# Patient Record
Sex: FEMALE | Race: WHITE | Employment: OTHER | URBAN - METROPOLITAN AREA
[De-identification: names, ages, dates, MRNs, and addresses within clinical notes are randomized per-mention and may not be internally consistent; named-entity substitution may affect disease eponyms.]

---

## 2024-10-28 ENCOUNTER — HOSPITAL ENCOUNTER (INPATIENT)
Age: 79
LOS: 16 days | Discharge: NURSING, REHAB OR LONG-TERM CARE | DRG: 870 | End: 2024-11-13
Attending: EMERGENCY MEDICINE | Admitting: INTERNAL MEDICINE
Payer: MEDICARE

## 2024-10-28 ENCOUNTER — APPOINTMENT (OUTPATIENT)
Dept: ULTRASOUND IMAGING | Age: 79
DRG: 870 | End: 2024-10-28
Payer: MEDICARE

## 2024-10-28 ENCOUNTER — APPOINTMENT (OUTPATIENT)
Dept: CT IMAGING | Age: 79
DRG: 870 | End: 2024-10-28
Payer: MEDICARE

## 2024-10-28 ENCOUNTER — APPOINTMENT (OUTPATIENT)
Dept: GENERAL RADIOLOGY | Age: 79
DRG: 870 | End: 2024-10-28
Payer: MEDICARE

## 2024-10-28 DIAGNOSIS — A41.9 SEPSIS, DUE TO UNSPECIFIED ORGANISM, UNSPECIFIED WHETHER ACUTE ORGAN DYSFUNCTION PRESENT (HCC): ICD-10-CM

## 2024-10-28 DIAGNOSIS — M62.81 GENERALIZED MUSCLE WEAKNESS: ICD-10-CM

## 2024-10-28 DIAGNOSIS — N30.00 ACUTE CYSTITIS WITHOUT HEMATURIA: ICD-10-CM

## 2024-10-28 DIAGNOSIS — T68.XXXA HYPOTHERMIA, INITIAL ENCOUNTER: Primary | ICD-10-CM

## 2024-10-28 DIAGNOSIS — I48.91 ATRIAL FIBRILLATION, UNSPECIFIED TYPE (HCC): ICD-10-CM

## 2024-10-28 PROBLEM — K74.60 DECOMPENSATED HEPATIC CIRRHOSIS (HCC): Status: ACTIVE | Noted: 2024-10-28

## 2024-10-28 PROBLEM — M62.82 RHABDOMYOLYSIS: Status: ACTIVE | Noted: 2024-10-28

## 2024-10-28 PROBLEM — N17.9 ACUTE KIDNEY FAILURE (HCC): Status: ACTIVE | Noted: 2024-10-28

## 2024-10-28 PROBLEM — R65.21 SEPTIC SHOCK (HCC): Status: ACTIVE | Noted: 2024-10-28

## 2024-10-28 PROBLEM — E87.20 LACTIC ACIDOSIS: Status: ACTIVE | Noted: 2024-10-28

## 2024-10-28 PROBLEM — E87.29 HIGH ANION GAP METABOLIC ACIDOSIS: Status: ACTIVE | Noted: 2024-10-28

## 2024-10-28 PROBLEM — N39.0 URINARY TRACT INFECTION: Status: ACTIVE | Noted: 2024-10-28

## 2024-10-28 PROBLEM — K72.90 DECOMPENSATED HEPATIC CIRRHOSIS (HCC): Status: ACTIVE | Noted: 2024-10-28

## 2024-10-28 PROBLEM — J96.01 ACUTE HYPOXIC RESPIRATORY FAILURE: Status: ACTIVE | Noted: 2024-10-28

## 2024-10-28 LAB
ALBUMIN SERPL-MCNC: 2.6 G/DL (ref 3.5–5.2)
ALBUMIN/GLOB SERPL: 1 {RATIO} (ref 1–2.5)
ALLEN TEST: POSITIVE
ALP SERPL-CCNC: 120 U/L (ref 35–104)
ALT SERPL-CCNC: 82 U/L (ref 10–35)
ANION GAP SERPL CALCULATED.3IONS-SCNC: 29 MMOL/L (ref 9–16)
AST SERPL-CCNC: 139 U/L (ref 10–35)
B-OH-BUTYR SERPL-MCNC: 6.15 MMOL/L (ref 0.02–0.27)
BACTERIA URNS QL MICRO: ABNORMAL
BASOPHILS # BLD: 0 K/UL (ref 0–0.2)
BASOPHILS NFR BLD: 0 % (ref 0–2)
BILIRUB SERPL-MCNC: 1 MG/DL (ref 0–1.2)
BILIRUB UR QL STRIP: ABNORMAL
BUN BLD-MCNC: 111 MG/DL (ref 8–26)
BUN SERPL-MCNC: 122 MG/DL (ref 8–23)
CA-I BLD-SCNC: 1.19 MMOL/L (ref 1.15–1.33)
CALCIUM SERPL-MCNC: 8.8 MG/DL (ref 8.6–10.4)
CASTS #/AREA URNS LPF: ABNORMAL /LPF (ref 0–2)
CASTS #/AREA URNS LPF: ABNORMAL /LPF (ref 0–2)
CHLORIDE BLD-SCNC: 116 MMOL/L (ref 98–107)
CHLORIDE SERPL-SCNC: 107 MMOL/L (ref 98–107)
CK SERPL-CCNC: 1564 U/L (ref 26–192)
CLARITY UR: ABNORMAL
CO2 BLD CALC-SCNC: 14 MMOL/L (ref 22–30)
CO2 SERPL-SCNC: 9 MMOL/L (ref 20–31)
COLOR UR: ABNORMAL
CREAT SERPL-MCNC: 2.3 MG/DL (ref 0.5–0.9)
CREAT UR-MCNC: 105 MG/DL (ref 28–217)
CRP SERPL HS-MCNC: 476 MG/L (ref 0–5)
D DIMER PPP FEU-MCNC: 2.47 UG/ML FEU (ref 0–0.57)
EGFR, POC: 22 ML/MIN/1.73M2
EOSINOPHIL # BLD: 0 K/UL (ref 0–0.44)
EOSINOPHILS RELATIVE PERCENT: 0 % (ref 1–4)
EPI CELLS #/AREA URNS HPF: ABNORMAL /HPF (ref 0–5)
ERYTHROCYTE [DISTWIDTH] IN BLOOD BY AUTOMATED COUNT: 14.4 % (ref 11.8–14.4)
FIO2: 100
GFR, ESTIMATED: 22 ML/MIN/1.73M2
GLUCOSE BLD-MCNC: 239 MG/DL (ref 74–100)
GLUCOSE BLD-MCNC: 256 MG/DL (ref 74–100)
GLUCOSE SERPL-MCNC: 263 MG/DL (ref 74–99)
GLUCOSE UR STRIP-MCNC: NEGATIVE MG/DL
HCO3 VENOUS: 13.6 MMOL/L (ref 22–29)
HCT VFR BLD AUTO: 42.8 % (ref 36.3–47.1)
HCT VFR BLD AUTO: 48 % (ref 36–46)
HGB BLD-MCNC: 13.9 G/DL (ref 11.9–15.1)
HGB UR QL STRIP.AUTO: ABNORMAL
IMM GRANULOCYTES # BLD AUTO: 0.23 K/UL (ref 0–0.3)
IMM GRANULOCYTES NFR BLD: 2 %
INR PPP: 1.8
KETONES UR STRIP-MCNC: ABNORMAL MG/DL
LACTIC ACID, WHOLE BLOOD: 3 MMOL/L (ref 0.7–2.1)
LEUKOCYTE ESTERASE UR QL STRIP: ABNORMAL
LIPASE SERPL-CCNC: 41 U/L (ref 13–60)
LYMPHOCYTES NFR BLD: 2.2 K/UL (ref 1.1–3.7)
LYMPHOCYTES RELATIVE PERCENT: 19 % (ref 24–43)
MAGNESIUM SERPL-MCNC: 3.5 MG/DL (ref 1.6–2.4)
MCH RBC QN AUTO: 30.6 PG (ref 25.2–33.5)
MCHC RBC AUTO-ENTMCNC: 32.5 G/DL (ref 28.4–34.8)
MCV RBC AUTO: 94.3 FL (ref 82.6–102.9)
MODE: ABNORMAL
MONOCYTES NFR BLD: 0.93 K/UL (ref 0.1–1.2)
MONOCYTES NFR BLD: 8 % (ref 3–12)
MORPHOLOGY: NORMAL
MYOGLOBIN SERPL-MCNC: 9165 NG/ML (ref 25–58)
NEGATIVE BASE EXCESS, ART: 15.2 MMOL/L (ref 0–2)
NEGATIVE BASE EXCESS, VEN: 15.6 MMOL/L (ref 0–2)
NEUTROPHILS NFR BLD: 71 % (ref 36–65)
NEUTS SEG NFR BLD: 8.24 K/UL (ref 1.5–8.1)
NITRITE UR QL STRIP: NEGATIVE
NRBC BLD-RTO: 0.3 PER 100 WBC
O2 DELIVERY DEVICE: ABNORMAL
O2 SAT, VEN: 87.6 % (ref 60–85)
PATIENT TEMP: 29.4
PCO2 VENOUS: 42.9 MM HG (ref 41–51)
PH UR STRIP: 5.5 [PH] (ref 5–8)
PH VENOUS: 7.11 (ref 7.32–7.43)
PLATELET # BLD AUTO: ABNORMAL K/UL (ref 138–453)
PLATELET, FLUORESCENCE: ABNORMAL K/UL (ref 138–453)
PO2 VENOUS: 71.9 MM HG (ref 30–50)
POC ANION GAP: 16 MMOL/L (ref 7–16)
POC CREATININE: 2.2 MG/DL (ref 0.51–1.19)
POC HCO3: 13.5 MMOL/L (ref 21–28)
POC HEMOGLOBIN (CALC): 16.3 G/DL (ref 12–16)
POC LACTIC ACID: 2.2 MMOL/L (ref 0.56–1.39)
POC LACTIC ACID: 2.7 MMOL/L (ref 0.56–1.39)
POC O2 SATURATION: 100 % (ref 94–98)
POC PCO2 TEMP: 29.3 MM HG
POC PCO2: 40.8 MM HG (ref 35–48)
POC PH TEMP: 7.23
POC PH: 7.13 (ref 7.35–7.45)
POC PO2 TEMP: 471 MM HG
POC PO2: 521.5 MM HG (ref 83–108)
POTASSIUM BLD-SCNC: 3.7 MMOL/L (ref 3.5–4.5)
POTASSIUM SERPL-SCNC: 3.7 MMOL/L (ref 3.7–5.3)
PROCALCITONIN SERPL-MCNC: 70.7 NG/ML (ref 0–0.09)
PROT SERPL-MCNC: 6.4 G/DL (ref 6.6–8.7)
PROT UR STRIP-MCNC: ABNORMAL MG/DL
PROTHROMBIN TIME: 20.8 SEC (ref 11.7–14.9)
RBC # BLD AUTO: 4.54 M/UL (ref 3.95–5.11)
RBC #/AREA URNS HPF: ABNORMAL /HPF (ref 0–2)
SAMPLE SITE: ABNORMAL
SODIUM BLD-SCNC: 144 MMOL/L (ref 138–146)
SODIUM SERPL-SCNC: 145 MMOL/L (ref 136–145)
SP GR UR STRIP: 1.02 (ref 1–1.03)
T4 FREE SERPL-MCNC: 0.8 NG/DL (ref 0.92–1.68)
TOTAL PROTEIN, URINE: 487 MG/DL
TROPONIN I SERPL HS-MCNC: 45 NG/L (ref 0–14)
TROPONIN I SERPL HS-MCNC: 47 NG/L (ref 0–14)
TSH SERPL DL<=0.05 MIU/L-ACNC: 4.26 UIU/ML (ref 0.27–4.2)
URINE TOTAL PROTEIN CREATININE RATIO: 4.64
UROBILINOGEN UR STRIP-ACNC: NORMAL EU/DL (ref 0–1)
WBC #/AREA URNS HPF: ABNORMAL /HPF (ref 0–5)
WBC OTHER # BLD: 11.6 K/UL (ref 3.5–11.3)

## 2024-10-28 PROCEDURE — 82550 ASSAY OF CK (CPK): CPT

## 2024-10-28 PROCEDURE — 6360000002 HC RX W HCPCS

## 2024-10-28 PROCEDURE — 99285 EMERGENCY DEPT VISIT HI MDM: CPT

## 2024-10-28 PROCEDURE — 87040 BLOOD CULTURE FOR BACTERIA: CPT

## 2024-10-28 PROCEDURE — 83874 ASSAY OF MYOGLOBIN: CPT

## 2024-10-28 PROCEDURE — 80051 ELECTROLYTE PANEL: CPT

## 2024-10-28 PROCEDURE — 6360000002 HC RX W HCPCS: Performed by: STUDENT IN AN ORGANIZED HEALTH CARE EDUCATION/TRAINING PROGRAM

## 2024-10-28 PROCEDURE — 96374 THER/PROPH/DIAG INJ IV PUSH: CPT

## 2024-10-28 PROCEDURE — 36600 WITHDRAWAL OF ARTERIAL BLOOD: CPT

## 2024-10-28 PROCEDURE — 87154 CUL TYP ID BLD PTHGN 6+ TRGT: CPT

## 2024-10-28 PROCEDURE — 85610 PROTHROMBIN TIME: CPT

## 2024-10-28 PROCEDURE — 87186 SC STD MICRODIL/AGAR DIL: CPT

## 2024-10-28 PROCEDURE — 85379 FIBRIN DEGRADATION QUANT: CPT

## 2024-10-28 PROCEDURE — 84439 ASSAY OF FREE THYROXINE: CPT

## 2024-10-28 PROCEDURE — 84145 PROCALCITONIN (PCT): CPT

## 2024-10-28 PROCEDURE — 72125 CT NECK SPINE W/O DYE: CPT

## 2024-10-28 PROCEDURE — 82436 ASSAY OF URINE CHLORIDE: CPT

## 2024-10-28 PROCEDURE — 80307 DRUG TEST PRSMV CHEM ANLYZR: CPT

## 2024-10-28 PROCEDURE — 87077 CULTURE AEROBIC IDENTIFY: CPT

## 2024-10-28 PROCEDURE — 83690 ASSAY OF LIPASE: CPT

## 2024-10-28 PROCEDURE — 2000000000 HC ICU R&B

## 2024-10-28 PROCEDURE — 6360000004 HC RX CONTRAST MEDICATION: Performed by: STUDENT IN AN ORGANIZED HEALTH CARE EDUCATION/TRAINING PROGRAM

## 2024-10-28 PROCEDURE — 82570 ASSAY OF URINE CREATININE: CPT

## 2024-10-28 PROCEDURE — 81001 URINALYSIS AUTO W/SCOPE: CPT

## 2024-10-28 PROCEDURE — 71045 X-RAY EXAM CHEST 1 VIEW: CPT

## 2024-10-28 PROCEDURE — 80053 COMPREHEN METABOLIC PANEL: CPT

## 2024-10-28 PROCEDURE — 82330 ASSAY OF CALCIUM: CPT

## 2024-10-28 PROCEDURE — 82803 BLOOD GASES ANY COMBINATION: CPT

## 2024-10-28 PROCEDURE — 84156 ASSAY OF PROTEIN URINE: CPT

## 2024-10-28 PROCEDURE — 71275 CT ANGIOGRAPHY CHEST: CPT

## 2024-10-28 PROCEDURE — 2580000003 HC RX 258

## 2024-10-28 PROCEDURE — 36415 COLL VENOUS BLD VENIPUNCTURE: CPT

## 2024-10-28 PROCEDURE — 84520 ASSAY OF UREA NITROGEN: CPT

## 2024-10-28 PROCEDURE — 87086 URINE CULTURE/COLONY COUNT: CPT

## 2024-10-28 PROCEDURE — 6370000000 HC RX 637 (ALT 250 FOR IP)

## 2024-10-28 PROCEDURE — 74174 CTA ABD&PLVS W/CONTRAST: CPT

## 2024-10-28 PROCEDURE — 76775 US EXAM ABDO BACK WALL LIM: CPT

## 2024-10-28 PROCEDURE — 87205 SMEAR GRAM STAIN: CPT

## 2024-10-28 PROCEDURE — 82010 KETONE BODYS QUAN: CPT

## 2024-10-28 PROCEDURE — 2500000003 HC RX 250 WO HCPCS: Performed by: STUDENT IN AN ORGANIZED HEALTH CARE EDUCATION/TRAINING PROGRAM

## 2024-10-28 PROCEDURE — 84300 ASSAY OF URINE SODIUM: CPT

## 2024-10-28 PROCEDURE — 86140 C-REACTIVE PROTEIN: CPT

## 2024-10-28 PROCEDURE — 70450 CT HEAD/BRAIN W/O DYE: CPT

## 2024-10-28 PROCEDURE — 94761 N-INVAS EAR/PLS OXIMETRY MLT: CPT

## 2024-10-28 PROCEDURE — 85025 COMPLETE CBC W/AUTO DIFF WBC: CPT

## 2024-10-28 PROCEDURE — 93005 ELECTROCARDIOGRAM TRACING: CPT | Performed by: STUDENT IN AN ORGANIZED HEALTH CARE EDUCATION/TRAINING PROGRAM

## 2024-10-28 PROCEDURE — 83735 ASSAY OF MAGNESIUM: CPT

## 2024-10-28 PROCEDURE — 2500000003 HC RX 250 WO HCPCS

## 2024-10-28 PROCEDURE — 96375 TX/PRO/DX INJ NEW DRUG ADDON: CPT

## 2024-10-28 PROCEDURE — 83605 ASSAY OF LACTIC ACID: CPT

## 2024-10-28 PROCEDURE — 5A1955Z RESPIRATORY VENTILATION, GREATER THAN 96 CONSECUTIVE HOURS: ICD-10-PCS | Performed by: STUDENT IN AN ORGANIZED HEALTH CARE EDUCATION/TRAINING PROGRAM

## 2024-10-28 PROCEDURE — 84484 ASSAY OF TROPONIN QUANT: CPT

## 2024-10-28 PROCEDURE — 87641 MR-STAPH DNA AMP PROBE: CPT

## 2024-10-28 PROCEDURE — 82565 ASSAY OF CREATININE: CPT

## 2024-10-28 PROCEDURE — 2700000000 HC OXYGEN THERAPY PER DAY

## 2024-10-28 PROCEDURE — 93005 ELECTROCARDIOGRAM TRACING: CPT | Performed by: INTERNAL MEDICINE

## 2024-10-28 PROCEDURE — 2580000003 HC RX 258: Performed by: STUDENT IN AN ORGANIZED HEALTH CARE EDUCATION/TRAINING PROGRAM

## 2024-10-28 PROCEDURE — 85014 HEMATOCRIT: CPT

## 2024-10-28 PROCEDURE — 84443 ASSAY THYROID STIM HORMONE: CPT

## 2024-10-28 PROCEDURE — 87088 URINE BACTERIA CULTURE: CPT

## 2024-10-28 PROCEDURE — 82947 ASSAY GLUCOSE BLOOD QUANT: CPT

## 2024-10-28 PROCEDURE — 0BH17EZ INSERTION OF ENDOTRACHEAL AIRWAY INTO TRACHEA, VIA NATURAL OR ARTIFICIAL OPENING: ICD-10-PCS | Performed by: STUDENT IN AN ORGANIZED HEALTH CARE EDUCATION/TRAINING PROGRAM

## 2024-10-28 RX ORDER — INSULIN LISPRO 100 [IU]/ML
0-8 INJECTION, SOLUTION INTRAVENOUS; SUBCUTANEOUS
Status: DISCONTINUED | OUTPATIENT
Start: 2024-10-28 | End: 2024-10-29

## 2024-10-28 RX ORDER — VASOPRESSIN IN DEXTROSE 5 % 20/100 ML
0.03 PLASTIC BAG, INJECTION (ML) INTRAVENOUS CONTINUOUS
Status: DISCONTINUED | OUTPATIENT
Start: 2024-10-28 | End: 2024-11-05

## 2024-10-28 RX ORDER — PHENYLEPHRINE HCL IN 0.9% NACL 50MG/250ML
10-300 PLASTIC BAG, INJECTION (ML) INTRAVENOUS CONTINUOUS
Status: DISCONTINUED | OUTPATIENT
Start: 2024-10-28 | End: 2024-10-30

## 2024-10-28 RX ORDER — NOREPINEPHRINE BITARTRATE 0.06 MG/ML
1-100 INJECTION, SOLUTION INTRAVENOUS CONTINUOUS
Status: DISCONTINUED | OUTPATIENT
Start: 2024-10-28 | End: 2024-10-29

## 2024-10-28 RX ORDER — POLYETHYLENE GLYCOL 3350 17 G/17G
17 POWDER, FOR SOLUTION ORAL DAILY PRN
Status: DISCONTINUED | OUTPATIENT
Start: 2024-10-28 | End: 2024-11-13 | Stop reason: HOSPADM

## 2024-10-28 RX ORDER — POTASSIUM CHLORIDE 7.45 MG/ML
10 INJECTION INTRAVENOUS
Status: DISPENSED | OUTPATIENT
Start: 2024-10-28 | End: 2024-10-29

## 2024-10-28 RX ORDER — 0.9 % SODIUM CHLORIDE 0.9 %
1000 INTRAVENOUS SOLUTION INTRAVENOUS ONCE
Status: COMPLETED | OUTPATIENT
Start: 2024-10-28 | End: 2024-10-28

## 2024-10-28 RX ORDER — HEPARIN SODIUM 5000 [USP'U]/ML
5000 INJECTION, SOLUTION INTRAVENOUS; SUBCUTANEOUS EVERY 8 HOURS SCHEDULED
Status: DISCONTINUED | OUTPATIENT
Start: 2024-10-28 | End: 2024-11-08

## 2024-10-28 RX ORDER — ALBUTEROL SULFATE 0.83 MG/ML
2.5 SOLUTION RESPIRATORY (INHALATION) EVERY 4 HOURS PRN
Status: DISCONTINUED | OUTPATIENT
Start: 2024-10-28 | End: 2024-11-13 | Stop reason: HOSPADM

## 2024-10-28 RX ORDER — ONDANSETRON 4 MG/1
4 TABLET, ORALLY DISINTEGRATING ORAL EVERY 8 HOURS PRN
Status: DISCONTINUED | OUTPATIENT
Start: 2024-10-28 | End: 2024-11-13 | Stop reason: HOSPADM

## 2024-10-28 RX ORDER — DEXTROSE MONOHYDRATE 100 MG/ML
INJECTION, SOLUTION INTRAVENOUS CONTINUOUS PRN
Status: DISCONTINUED | OUTPATIENT
Start: 2024-10-28 | End: 2024-11-13 | Stop reason: HOSPADM

## 2024-10-28 RX ORDER — SODIUM CHLORIDE 9 MG/ML
INJECTION, SOLUTION INTRAVENOUS PRN
Status: DISCONTINUED | OUTPATIENT
Start: 2024-10-28 | End: 2024-11-13 | Stop reason: HOSPADM

## 2024-10-28 RX ORDER — ACETAMINOPHEN 325 MG/1
650 TABLET ORAL EVERY 6 HOURS PRN
Status: DISCONTINUED | OUTPATIENT
Start: 2024-10-28 | End: 2024-11-13 | Stop reason: HOSPADM

## 2024-10-28 RX ORDER — MIDAZOLAM HYDROCHLORIDE 2 MG/2ML
1 INJECTION, SOLUTION INTRAMUSCULAR; INTRAVENOUS EVERY 30 MIN PRN
Status: DISCONTINUED | OUTPATIENT
Start: 2024-10-28 | End: 2024-11-13 | Stop reason: HOSPADM

## 2024-10-28 RX ORDER — IOPAMIDOL 755 MG/ML
100 INJECTION, SOLUTION INTRAVASCULAR
Status: COMPLETED | OUTPATIENT
Start: 2024-10-28 | End: 2024-10-28

## 2024-10-28 RX ORDER — GLUCAGON 1 MG/ML
1 KIT INJECTION PRN
Status: DISCONTINUED | OUTPATIENT
Start: 2024-10-28 | End: 2024-11-13 | Stop reason: HOSPADM

## 2024-10-28 RX ORDER — ONDANSETRON 2 MG/ML
4 INJECTION INTRAMUSCULAR; INTRAVENOUS EVERY 6 HOURS PRN
Status: DISCONTINUED | OUTPATIENT
Start: 2024-10-28 | End: 2024-11-13 | Stop reason: HOSPADM

## 2024-10-28 RX ORDER — ACETAMINOPHEN 650 MG/1
650 SUPPOSITORY RECTAL EVERY 6 HOURS PRN
Status: DISCONTINUED | OUTPATIENT
Start: 2024-10-28 | End: 2024-11-13 | Stop reason: HOSPADM

## 2024-10-28 RX ORDER — LINEZOLID 2 MG/ML
600 INJECTION, SOLUTION INTRAVENOUS EVERY 12 HOURS
Status: DISCONTINUED | OUTPATIENT
Start: 2024-10-28 | End: 2024-10-29

## 2024-10-28 RX ORDER — SODIUM CHLORIDE 0.9 % (FLUSH) 0.9 %
5-40 SYRINGE (ML) INJECTION PRN
Status: DISCONTINUED | OUTPATIENT
Start: 2024-10-28 | End: 2024-11-13 | Stop reason: HOSPADM

## 2024-10-28 RX ORDER — SODIUM CHLORIDE 0.9 % (FLUSH) 0.9 %
5-40 SYRINGE (ML) INJECTION EVERY 12 HOURS SCHEDULED
Status: DISCONTINUED | OUTPATIENT
Start: 2024-10-28 | End: 2024-11-13 | Stop reason: HOSPADM

## 2024-10-28 RX ADMIN — SODIUM CHLORIDE 3.8 UNITS/HR: 9 INJECTION, SOLUTION INTRAVENOUS at 23:57

## 2024-10-28 RX ADMIN — SODIUM BICARBONATE: 84 INJECTION, SOLUTION INTRAVENOUS at 23:59

## 2024-10-28 RX ADMIN — IOPAMIDOL 100 ML: 755 INJECTION, SOLUTION INTRAVENOUS at 18:45

## 2024-10-28 RX ADMIN — EPINEPHRINE 2 MCG/MIN: 1 INJECTION INTRAMUSCULAR; INTRAVENOUS; SUBCUTANEOUS at 17:51

## 2024-10-28 RX ADMIN — SODIUM CHLORIDE 1000 ML: 9 INJECTION, SOLUTION INTRAVENOUS at 18:11

## 2024-10-28 RX ADMIN — PANTOPRAZOLE SODIUM 80 MG: 40 INJECTION, POWDER, FOR SOLUTION INTRAVENOUS at 21:27

## 2024-10-28 RX ADMIN — WATER 1000 MG: 1 INJECTION INTRAMUSCULAR; INTRAVENOUS; SUBCUTANEOUS at 20:11

## 2024-10-28 RX ADMIN — Medication 10 MCG/MIN: at 17:43

## 2024-10-28 RX ADMIN — Medication 50 MCG/HR: at 18:16

## 2024-10-28 RX ADMIN — VASOPRESSIN 0.03 UNITS/MIN: 0.2 INJECTION INTRAVENOUS at 22:54

## 2024-10-28 ASSESSMENT — PULMONARY FUNCTION TESTS
PIF_VALUE: 21
PIF_VALUE: 22
PIF_VALUE: 23
PIF_VALUE: 21

## 2024-10-28 ASSESSMENT — LIFESTYLE VARIABLES: HOW OFTEN DO YOU HAVE A DRINK CONTAINING ALCOHOL: PATIENT UNABLE TO ANSWER

## 2024-10-28 NOTE — ED TRIAGE NOTES
Pt presents to ED room 14 via EMS from home found unresponsive and down. EMS reports that a well check was called on the patient d/t family not hearing from her for six days. EMS reports that they found patient down in her kitchen covered in bloody stool. EMS reports that they intubated patient on scene.

## 2024-10-28 NOTE — ED PROVIDER NOTES
[facility] ED     Emergency Department     Faculty Attestation    I performed a history and physical examination of the patient and discussed management with the resident. I reviewed the resident’s note and agree with the documented findings and plan of care. Any areas of disagreement are noted on the chart. I was personally present for the key portions of any procedures. I have documented in the chart those procedures where I was not present during the key portions. I have reviewed the emergency nurses triage note. I agree with the chief complaint, past medical history, past surgical history, allergies, medications, social and family history as documented unless otherwise noted below. For Physician Assistant/ Nurse Practitioner cases/documentation I have personally evaluated this patient and have completed at least one if not all key elements of the E/M (history, physical exam, and MDM). Additional findings are as noted.    Note Started: 5:50 PM EDT    Patient arrives by EMS who provides independent history for altered mental status.  Last known well was last Wednesday evening per family.  Patient was found today when someone went to check on her.  EMS patient was obtunded covered in stool urine with signs of black tarry stools and multiple rooms in the house.  Patient was intubated for airway protection placed to IO's and started on epinephrine drip for hypotension.  On arrival GCS 3 T pupils equal round reactive bilaterally.  Thready weak pulses in the extremities but strong carotid diminished femoral but strong by Doppler.  Extremely cold to the touch core temperature was 27 2 on arrival.  No obvious external signs of trauma.  Will proceed with active rewarming full workup imaging monitor closely await family arrival for additional independent history admit to ICU    EKG interpretation: Atrial fibrillation 91 normal intervals normal axis no acute ST changes there is T wave inversion inferior

## 2024-10-29 ENCOUNTER — APPOINTMENT (OUTPATIENT)
Dept: GENERAL RADIOLOGY | Age: 79
DRG: 870 | End: 2024-10-29
Payer: MEDICARE

## 2024-10-29 ENCOUNTER — APPOINTMENT (OUTPATIENT)
Age: 79
DRG: 870 | End: 2024-10-29
Payer: MEDICARE

## 2024-10-29 PROBLEM — G93.40 ACUTE ENCEPHALOPATHY: Status: ACTIVE | Noted: 2024-10-29

## 2024-10-29 PROBLEM — N12 PYELONEPHRITIS: Status: ACTIVE | Noted: 2024-10-29

## 2024-10-29 PROBLEM — A41.51 E. COLI SEPTICEMIA (HCC): Status: ACTIVE | Noted: 2024-10-29

## 2024-10-29 PROBLEM — R65.10 SIRS (SYSTEMIC INFLAMMATORY RESPONSE SYNDROME) (HCC): Status: ACTIVE | Noted: 2024-10-29

## 2024-10-29 PROBLEM — R79.82 CRP ELEVATED: Status: ACTIVE | Noted: 2024-10-29

## 2024-10-29 PROBLEM — N17.9 ACUTE KIDNEY INJURY (HCC): Status: ACTIVE | Noted: 2024-10-29

## 2024-10-29 PROBLEM — R79.89 ELEVATED PROCALCITONIN: Status: ACTIVE | Noted: 2024-10-29

## 2024-10-29 LAB
ABO + RH BLD: NORMAL
ALBUMIN SERPL-MCNC: 2.6 G/DL (ref 3.5–5.2)
ALBUMIN/GLOB SERPL: 1 {RATIO} (ref 1–2.5)
ALP SERPL-CCNC: 181 U/L (ref 35–104)
ALT SERPL-CCNC: 91 U/L (ref 10–35)
AMMONIA PLAS-SCNC: 72 UMOL/L (ref 11–51)
AMPHET UR QL SCN: NEGATIVE
ANION GAP SERPL CALCULATED.3IONS-SCNC: 19 MMOL/L (ref 9–16)
ANION GAP SERPL CALCULATED.3IONS-SCNC: 22 MMOL/L (ref 9–16)
ANION GAP SERPL CALCULATED.3IONS-SCNC: 22 MMOL/L (ref 9–16)
ANION GAP SERPL CALCULATED.3IONS-SCNC: 23 MMOL/L (ref 9–16)
ANION GAP SERPL CALCULATED.3IONS-SCNC: 24 MMOL/L (ref 9–16)
ARM BAND NUMBER: NORMAL
AST SERPL-CCNC: 169 U/L (ref 10–35)
B-OH-BUTYR SERPL-MCNC: 0.27 MMOL/L (ref 0.02–0.27)
BARBITURATES UR QL SCN: NEGATIVE
BASOPHILS # BLD: 0 K/UL (ref 0–0.2)
BASOPHILS NFR BLD: 0 % (ref 0–2)
BENZODIAZ UR QL: NEGATIVE
BILIRUB SERPL-MCNC: 1 MG/DL (ref 0–1.2)
BLOOD BANK SAMPLE EXPIRATION: NORMAL
BLOOD GROUP ANTIBODIES SERPL: NEGATIVE
BUN SERPL-MCNC: 110 MG/DL (ref 8–23)
BUN SERPL-MCNC: 116 MG/DL (ref 8–23)
BUN SERPL-MCNC: 117 MG/DL (ref 8–23)
BUN SERPL-MCNC: 118 MG/DL (ref 8–23)
BUN SERPL-MCNC: 121 MG/DL (ref 8–23)
C3 SERPL-MCNC: 96 MG/DL (ref 90–180)
C4 SERPL-MCNC: 10 MG/DL (ref 10–40)
CA-I BLD-SCNC: 0.93 MMOL/L (ref 1.13–1.33)
CALCIUM SERPL-MCNC: 6.9 MG/DL (ref 8.6–10.4)
CALCIUM SERPL-MCNC: 7.3 MG/DL (ref 8.6–10.4)
CALCIUM SERPL-MCNC: 7.6 MG/DL (ref 8.6–10.4)
CALCIUM SERPL-MCNC: 8.1 MG/DL (ref 8.6–10.4)
CALCIUM SERPL-MCNC: 8.3 MG/DL (ref 8.6–10.4)
CANNABINOIDS UR QL SCN: NEGATIVE
CHLORIDE SERPL-SCNC: 106 MMOL/L (ref 98–107)
CHLORIDE SERPL-SCNC: 106 MMOL/L (ref 98–107)
CHLORIDE SERPL-SCNC: 107 MMOL/L (ref 98–107)
CHLORIDE SERPL-SCNC: 108 MMOL/L (ref 98–107)
CHLORIDE SERPL-SCNC: 108 MMOL/L (ref 98–107)
CHLORIDE UR-SCNC: 31 MMOL/L
CK SERPL-CCNC: 2358 U/L (ref 26–192)
CO2 SERPL-SCNC: 13 MMOL/L (ref 20–31)
CO2 SERPL-SCNC: 14 MMOL/L (ref 20–31)
CO2 SERPL-SCNC: 14 MMOL/L (ref 20–31)
CO2 SERPL-SCNC: 17 MMOL/L (ref 20–31)
CO2 SERPL-SCNC: 19 MMOL/L (ref 20–31)
COCAINE UR QL SCN: NEGATIVE
CREAT SERPL-MCNC: 2.5 MG/DL (ref 0.6–0.9)
CREAT SERPL-MCNC: 2.6 MG/DL (ref 0.6–0.9)
CREAT SERPL-MCNC: 2.7 MG/DL (ref 0.6–0.9)
CREAT SERPL-MCNC: 2.8 MG/DL (ref 0.6–0.9)
CREAT SERPL-MCNC: 2.8 MG/DL (ref 0.6–0.9)
CRITICAL ACTION: NORMAL
CRITICAL NOTIFICATION DATE/TIME: NORMAL
CRITICAL NOTIFICATION: NORMAL
CRITICAL VALUE READ BACK: YES
ECHO AO ROOT DIAM: 3 CM
ECHO AO ROOT INDEX: 1.57 CM/M2
ECHO AV AREA PEAK VELOCITY: 2.1 CM2
ECHO AV AREA VTI: 1.9 CM2
ECHO AV AREA/BSA PEAK VELOCITY: 1.1 CM2/M2
ECHO AV AREA/BSA VTI: 1 CM2/M2
ECHO AV MEAN GRADIENT: 5 MMHG
ECHO AV MEAN VELOCITY: 1 M/S
ECHO AV PEAK GRADIENT: 11 MMHG
ECHO AV PEAK VELOCITY: 1.6 M/S
ECHO AV VELOCITY RATIO: 0.69
ECHO AV VTI: 23.1 CM
ECHO BSA: 1.97 M2
ECHO EST RA PRESSURE: 0 MMHG
ECHO LA AREA 2C: 20.5 CM2
ECHO LA AREA 4C: 20.4 CM2
ECHO LA DIAMETER INDEX: 1.99 CM/M2
ECHO LA DIAMETER: 3.8 CM
ECHO LA MAJOR AXIS: 5.3 CM
ECHO LA MINOR AXIS: 5.5 CM
ECHO LA TO AORTIC ROOT RATIO: 1.27
ECHO LA VOL BP: 64 ML (ref 22–52)
ECHO LA VOL MOD A2C: 62 ML (ref 22–52)
ECHO LA VOL MOD A4C: 64 ML (ref 22–52)
ECHO LA VOL/BSA BIPLANE: 34 ML/M2 (ref 16–34)
ECHO LA VOLUME INDEX MOD A2C: 32 ML/M2 (ref 16–34)
ECHO LA VOLUME INDEX MOD A4C: 34 ML/M2 (ref 16–34)
ECHO LV E' LATERAL VELOCITY: 8.3 CM/S
ECHO LV E' SEPTAL VELOCITY: 6.6 CM/S
ECHO LV EDV A2C: 52 ML
ECHO LV EDV A4C: 55 ML
ECHO LV EDV INDEX A4C: 29 ML/M2
ECHO LV EDV NDEX A2C: 27 ML/M2
ECHO LV EJECTION FRACTION A2C: 59 %
ECHO LV EJECTION FRACTION A4C: 50 %
ECHO LV EJECTION FRACTION BIPLANE: 55 % (ref 55–100)
ECHO LV ESV A2C: 22 ML
ECHO LV ESV A4C: 28 ML
ECHO LV ESV INDEX A2C: 12 ML/M2
ECHO LV ESV INDEX A4C: 15 ML/M2
ECHO LV FRACTIONAL SHORTENING: 26 % (ref 28–44)
ECHO LV INTERNAL DIMENSION DIASTOLE INDEX: 1.83 CM/M2
ECHO LV INTERNAL DIMENSION DIASTOLIC: 3.5 CM (ref 3.9–5.3)
ECHO LV INTERNAL DIMENSION SYSTOLIC INDEX: 1.36 CM/M2
ECHO LV INTERNAL DIMENSION SYSTOLIC: 2.6 CM
ECHO LV IVSD: 1.2 CM (ref 0.6–0.9)
ECHO LV MASS 2D: 135.8 G (ref 67–162)
ECHO LV MASS INDEX 2D: 71.1 G/M2 (ref 43–95)
ECHO LV POSTERIOR WALL DIASTOLIC: 1.2 CM (ref 0.6–0.9)
ECHO LV RELATIVE WALL THICKNESS RATIO: 0.69
ECHO LVOT AREA: 3.1 CM2
ECHO LVOT AV VTI INDEX: 0.61
ECHO LVOT DIAM: 2 CM
ECHO LVOT MEAN GRADIENT: 2 MMHG
ECHO LVOT PEAK GRADIENT: 5 MMHG
ECHO LVOT PEAK VELOCITY: 1.1 M/S
ECHO LVOT STROKE VOLUME INDEX: 23.3 ML/M2
ECHO LVOT SV: 44.6 ML
ECHO LVOT VTI: 14.2 CM
ECHO MV A VELOCITY: 0.81 M/S
ECHO MV AREA VTI: 3.2 CM2
ECHO MV E DECELERATION TIME (DT): 155 MS
ECHO MV E VELOCITY: 0.59 M/S
ECHO MV E/A RATIO: 0.73
ECHO MV E/E' LATERAL: 7.11
ECHO MV E/E' RATIO (AVERAGED): 8.02
ECHO MV E/E' SEPTAL: 8.94
ECHO MV LVOT VTI INDEX: 0.99
ECHO MV MAX VELOCITY: 0.9 M/S
ECHO MV MEAN GRADIENT: 1 MMHG
ECHO MV MEAN VELOCITY: 0.6 M/S
ECHO MV PEAK GRADIENT: 3 MMHG
ECHO MV VTI: 14 CM
ECHO PV MAX VELOCITY: 1 M/S
ECHO PV PEAK GRADIENT: 4 MMHG
ECHO RA AREA 4C: 11.5 CM2
ECHO RA END SYSTOLIC VOLUME APICAL 4 CHAMBER INDEX BSA: 12 ML/M2
ECHO RA VOLUME: 23 ML
ECHO RIGHT VENTRICULAR SYSTOLIC PRESSURE (RVSP): 26 MMHG
ECHO RV BASAL DIMENSION: 4.3 CM
ECHO RV FREE WALL PEAK S': 14 CM/S
ECHO TV REGURGITANT MAX VELOCITY: 2.57 M/S
ECHO TV REGURGITANT PEAK GRADIENT: 26 MMHG
EKG ATRIAL RATE: 81 BPM
EKG ATRIAL RATE: 88 BPM
EKG Q-T INTERVAL: 384 MS
EKG Q-T INTERVAL: 420 MS
EKG QRS DURATION: 80 MS
EKG QRS DURATION: 88 MS
EKG QTC CALCULATION (BAZETT): 516 MS
EKG QTC CALCULATION (BAZETT): 554 MS
EKG R AXIS: 47 DEGREES
EKG R AXIS: 52 DEGREES
EKG T AXIS: -139 DEGREES
EKG T AXIS: -64 DEGREES
EKG VENTRICULAR RATE: 125 BPM
EKG VENTRICULAR RATE: 91 BPM
EOSINOPHIL # BLD: 0 K/UL (ref 0–0.4)
EOSINOPHILS RELATIVE PERCENT: 0 % (ref 1–4)
ERYTHROCYTE [DISTWIDTH] IN BLOOD BY AUTOMATED COUNT: 14.1 % (ref 11.8–14.4)
ERYTHROCYTE [DISTWIDTH] IN BLOOD BY AUTOMATED COUNT: 14.4 % (ref 11.8–14.4)
ERYTHROCYTE [DISTWIDTH] IN BLOOD BY AUTOMATED COUNT: 14.7 % (ref 11.8–14.4)
FENTANYL UR QL: NEGATIVE
FIO2: 40
FREE KAPPA/LAMBDA RATIO: 1.63 (ref 0.22–1.74)
GFR, ESTIMATED: 17 ML/MIN/1.73M2
GFR, ESTIMATED: 17 ML/MIN/1.73M2
GFR, ESTIMATED: 18 ML/MIN/1.73M2
GFR, ESTIMATED: 19 ML/MIN/1.73M2
GFR, ESTIMATED: 20 ML/MIN/1.73M2
GLUCOSE BLD-MCNC: 125 MG/DL (ref 65–105)
GLUCOSE BLD-MCNC: 126 MG/DL (ref 65–105)
GLUCOSE BLD-MCNC: 138 MG/DL (ref 65–105)
GLUCOSE BLD-MCNC: 142 MG/DL (ref 65–105)
GLUCOSE BLD-MCNC: 167 MG/DL (ref 65–105)
GLUCOSE BLD-MCNC: 167 MG/DL (ref 65–105)
GLUCOSE BLD-MCNC: 169 MG/DL (ref 65–105)
GLUCOSE BLD-MCNC: 180 MG/DL (ref 65–105)
GLUCOSE BLD-MCNC: 185 MG/DL (ref 65–105)
GLUCOSE BLD-MCNC: 193 MG/DL (ref 65–105)
GLUCOSE BLD-MCNC: 224 MG/DL (ref 65–105)
GLUCOSE BLD-MCNC: 234 MG/DL (ref 65–105)
GLUCOSE BLD-MCNC: 239 MG/DL (ref 65–105)
GLUCOSE BLD-MCNC: 244 MG/DL (ref 65–105)
GLUCOSE BLD-MCNC: 250 MG/DL (ref 65–105)
GLUCOSE BLD-MCNC: 251 MG/DL (ref 65–105)
GLUCOSE BLD-MCNC: 265 MG/DL (ref 65–105)
GLUCOSE BLD-MCNC: 272 MG/DL (ref 65–105)
GLUCOSE BLD-MCNC: 282 MG/DL (ref 65–105)
GLUCOSE BLD-MCNC: 322 MG/DL (ref 74–100)
GLUCOSE SERPL-MCNC: 158 MG/DL (ref 74–99)
GLUCOSE SERPL-MCNC: 179 MG/DL (ref 74–99)
GLUCOSE SERPL-MCNC: 238 MG/DL (ref 74–99)
GLUCOSE SERPL-MCNC: 251 MG/DL (ref 74–99)
GLUCOSE SERPL-MCNC: 305 MG/DL (ref 74–99)
HAV IGM SERPL QL IA: NONREACTIVE
HBV CORE IGM SERPL QL IA: NONREACTIVE
HBV SURFACE AG SERPL QL IA: NONREACTIVE
HCT VFR BLD AUTO: 32.2 % (ref 36.3–47.1)
HCT VFR BLD AUTO: 32.2 % (ref 36.3–47.1)
HCT VFR BLD AUTO: 34 % (ref 36.3–47.1)
HCT VFR BLD AUTO: 39.7 % (ref 36.3–47.1)
HCT VFR BLD AUTO: 40.2 % (ref 36.3–47.1)
HCV AB SERPL QL IA: NONREACTIVE
HGB BLD-MCNC: 11.2 G/DL (ref 11.9–15.1)
HGB BLD-MCNC: 11.4 G/DL (ref 11.9–15.1)
HGB BLD-MCNC: 11.6 G/DL (ref 11.9–15.1)
HGB BLD-MCNC: 13.2 G/DL (ref 11.9–15.1)
HGB BLD-MCNC: 13.8 G/DL (ref 11.9–15.1)
IMM GRANULOCYTES # BLD AUTO: 0.9 K/UL (ref 0–0.3)
IMM GRANULOCYTES NFR BLD: 8 %
KAPPA LC FREE SER-MCNC: 97.1 MG/L
LACTIC ACID, WHOLE BLOOD: 4.4 MMOL/L (ref 0.7–2.1)
LACTIC ACID, WHOLE BLOOD: 4.9 MMOL/L (ref 0.7–2.1)
LACTIC ACID, WHOLE BLOOD: 5.7 MMOL/L (ref 0.7–2.1)
LACTIC ACID, WHOLE BLOOD: 5.8 MMOL/L (ref 0.7–2.1)
LACTIC ACID, WHOLE BLOOD: 5.9 MMOL/L (ref 0.7–2.1)
LAMBDA LC FREE SERPL-MCNC: 59.5 MG/L (ref 4.2–27.7)
LYMPHOCYTES NFR BLD: 2.71 K/UL (ref 1–4.8)
LYMPHOCYTES RELATIVE PERCENT: 24 % (ref 24–44)
MAGNESIUM SERPL-MCNC: 2.3 MG/DL (ref 1.6–2.4)
MCH RBC QN AUTO: 30.5 PG (ref 25.2–33.5)
MCH RBC QN AUTO: 30.7 PG (ref 25.2–33.5)
MCH RBC QN AUTO: 30.7 PG (ref 25.2–33.5)
MCHC RBC AUTO-ENTMCNC: 33.2 G/DL (ref 28.4–34.8)
MCHC RBC AUTO-ENTMCNC: 34.8 G/DL (ref 28.4–34.8)
MCHC RBC AUTO-ENTMCNC: 35.4 G/DL (ref 28.4–34.8)
MCV RBC AUTO: 86.8 FL (ref 82.6–102.9)
MCV RBC AUTO: 88.2 FL (ref 82.6–102.9)
MCV RBC AUTO: 91.7 FL (ref 82.6–102.9)
METHADONE UR QL: NEGATIVE
MONOCYTES NFR BLD: 0.68 K/UL (ref 0.1–0.8)
MONOCYTES NFR BLD: 6 % (ref 1–7)
MORPHOLOGY: ABNORMAL
MORPHOLOGY: ABNORMAL
MRSA, DNA, NASAL: NEGATIVE
MYOGLOBIN SERPL-MCNC: ABNORMAL NG/ML (ref 25–58)
NEGATIVE BASE EXCESS, ART: 13.5 MMOL/L (ref 0–2)
NEUTROPHILS NFR BLD: 62 % (ref 36–66)
NEUTS SEG NFR BLD: 7.01 K/UL (ref 1.8–7.7)
NRBC BLD-RTO: 0.6 PER 100 WBC
NRBC BLD-RTO: 1 PER 100 WBC
NRBC BLD-RTO: 1.6 PER 100 WBC
NUCLEATED RED BLOOD CELLS: 4 PER 100 WBC
OPIATES UR QL SCN: NEGATIVE
OXYCODONE UR QL SCN: NEGATIVE
PCP UR QL SCN: NEGATIVE
PHOSPHATE SERPL-MCNC: 3.9 MG/DL (ref 2.5–4.5)
PHOSPHATE SERPL-MCNC: 4.9 MG/DL (ref 2.5–4.5)
PHOSPHATE SERPL-MCNC: 5.3 MG/DL (ref 2.5–4.5)
PHOSPHATE SERPL-MCNC: 6.1 MG/DL (ref 2.5–4.5)
PLATELET # BLD AUTO: 69 K/UL (ref 138–453)
PLATELET # BLD AUTO: ABNORMAL K/UL (ref 138–453)
PLATELET # BLD AUTO: ABNORMAL K/UL (ref 138–453)
PLATELET, FLUORESCENCE: 13 K/UL (ref 138–453)
PLATELET, FLUORESCENCE: 37 K/UL (ref 138–453)
PLATELETS.RETICULATED NFR BLD AUTO: 14.7 % (ref 1.1–10.3)
PLATELETS.RETICULATED NFR BLD AUTO: 4.9 % (ref 1.1–10.3)
PMV BLD AUTO: 11.8 FL (ref 8.1–13.5)
PMV BLD AUTO: 12.2 FL (ref 8.1–13.5)
POC HCO3: 14.1 MMOL/L (ref 21–28)
POC LACTIC ACID: 3.7 MMOL/L (ref 0.56–1.39)
POC O2 SATURATION: 94.7 % (ref 94–98)
POC PCO2: 37.5 MM HG (ref 35–48)
POC PH: 7.18 (ref 7.35–7.45)
POC PO2: 91 MM HG (ref 83–108)
POTASSIUM SERPL-SCNC: 4 MMOL/L (ref 3.7–5.3)
POTASSIUM SERPL-SCNC: 4 MMOL/L (ref 3.7–5.3)
POTASSIUM SERPL-SCNC: 4.1 MMOL/L (ref 3.7–5.3)
POTASSIUM SERPL-SCNC: 4.2 MMOL/L (ref 3.7–5.3)
POTASSIUM SERPL-SCNC: 4.5 MMOL/L (ref 3.7–5.3)
PROT SERPL-MCNC: 6 G/DL (ref 6.6–8.7)
RBC # BLD AUTO: 3.65 M/UL (ref 3.95–5.11)
RBC # BLD AUTO: 3.71 M/UL (ref 3.95–5.11)
RBC # BLD AUTO: 4.33 M/UL (ref 3.95–5.11)
SAMPLE SITE: ABNORMAL
SODIUM SERPL-SCNC: 142 MMOL/L (ref 136–145)
SODIUM SERPL-SCNC: 144 MMOL/L (ref 136–145)
SODIUM SERPL-SCNC: 145 MMOL/L (ref 136–145)
SODIUM SERPL-SCNC: 145 MMOL/L (ref 136–145)
SODIUM SERPL-SCNC: 146 MMOL/L (ref 136–145)
SODIUM UR-SCNC: 31 MMOL/L
SPECIMEN DESCRIPTION: NORMAL
TEST INFORMATION: NORMAL
WBC OTHER # BLD: 11.3 K/UL (ref 3.5–11.3)
WBC OTHER # BLD: 24.3 K/UL (ref 3.5–11.3)
WBC OTHER # BLD: 24.5 K/UL (ref 3.5–11.3)

## 2024-10-29 PROCEDURE — 82330 ASSAY OF CALCIUM: CPT

## 2024-10-29 PROCEDURE — 86850 RBC ANTIBODY SCREEN: CPT

## 2024-10-29 PROCEDURE — 83521 IG LIGHT CHAINS FREE EACH: CPT

## 2024-10-29 PROCEDURE — 82803 BLOOD GASES ANY COMBINATION: CPT

## 2024-10-29 PROCEDURE — P9073 PLATELETS PHERESIS PATH REDU: HCPCS

## 2024-10-29 PROCEDURE — 80053 COMPREHEN METABOLIC PANEL: CPT

## 2024-10-29 PROCEDURE — 84165 PROTEIN E-PHORESIS SERUM: CPT

## 2024-10-29 PROCEDURE — 85025 COMPLETE CBC W/AUTO DIFF WBC: CPT

## 2024-10-29 PROCEDURE — 87205 SMEAR GRAM STAIN: CPT

## 2024-10-29 PROCEDURE — 2700000000 HC OXYGEN THERAPY PER DAY

## 2024-10-29 PROCEDURE — 93010 ELECTROCARDIOGRAM REPORT: CPT | Performed by: INTERNAL MEDICINE

## 2024-10-29 PROCEDURE — 83874 ASSAY OF MYOGLOBIN: CPT

## 2024-10-29 PROCEDURE — 36620 INSERTION CATHETER ARTERY: CPT

## 2024-10-29 PROCEDURE — 83605 ASSAY OF LACTIC ACID: CPT

## 2024-10-29 PROCEDURE — 2500000003 HC RX 250 WO HCPCS

## 2024-10-29 PROCEDURE — 99291 CRITICAL CARE FIRST HOUR: CPT | Performed by: INTERNAL MEDICINE

## 2024-10-29 PROCEDURE — 2580000003 HC RX 258

## 2024-10-29 PROCEDURE — 85055 RETICULATED PLATELET ASSAY: CPT

## 2024-10-29 PROCEDURE — 86334 IMMUNOFIX E-PHORESIS SERUM: CPT

## 2024-10-29 PROCEDURE — 4A133J1 MONITORING OF ARTERIAL PULSE, PERIPHERAL, PERCUTANEOUS APPROACH: ICD-10-PCS | Performed by: INTERNAL MEDICINE

## 2024-10-29 PROCEDURE — 93005 ELECTROCARDIOGRAM TRACING: CPT | Performed by: INTERNAL MEDICINE

## 2024-10-29 PROCEDURE — 85027 COMPLETE CBC AUTOMATED: CPT

## 2024-10-29 PROCEDURE — 6360000002 HC RX W HCPCS

## 2024-10-29 PROCEDURE — 82010 KETONE BODYS QUAN: CPT

## 2024-10-29 PROCEDURE — 86038 ANTINUCLEAR ANTIBODIES: CPT

## 2024-10-29 PROCEDURE — 99223 1ST HOSP IP/OBS HIGH 75: CPT | Performed by: INTERNAL MEDICINE

## 2024-10-29 PROCEDURE — 93306 TTE W/DOPPLER COMPLETE: CPT

## 2024-10-29 PROCEDURE — 84100 ASSAY OF PHOSPHORUS: CPT

## 2024-10-29 PROCEDURE — 05HM33Z INSERTION OF INFUSION DEVICE INTO RIGHT INTERNAL JUGULAR VEIN, PERCUTANEOUS APPROACH: ICD-10-PCS | Performed by: INTERNAL MEDICINE

## 2024-10-29 PROCEDURE — 82140 ASSAY OF AMMONIA: CPT

## 2024-10-29 PROCEDURE — 6370000000 HC RX 637 (ALT 250 FOR IP)

## 2024-10-29 PROCEDURE — 93005 ELECTROCARDIOGRAM TRACING: CPT

## 2024-10-29 PROCEDURE — 87070 CULTURE OTHR SPECIMN AEROBIC: CPT

## 2024-10-29 PROCEDURE — 5A1D90Z PERFORMANCE OF URINARY FILTRATION, CONTINUOUS, GREATER THAN 18 HOURS PER DAY: ICD-10-PCS | Performed by: INTERNAL MEDICINE

## 2024-10-29 PROCEDURE — 74018 RADEX ABDOMEN 1 VIEW: CPT

## 2024-10-29 PROCEDURE — 6360000002 HC RX W HCPCS: Performed by: STUDENT IN AN ORGANIZED HEALTH CARE EDUCATION/TRAINING PROGRAM

## 2024-10-29 PROCEDURE — 2000000000 HC ICU R&B

## 2024-10-29 PROCEDURE — 82550 ASSAY OF CK (CPK): CPT

## 2024-10-29 PROCEDURE — 86901 BLOOD TYPING SEROLOGIC RH(D): CPT

## 2024-10-29 PROCEDURE — 86225 DNA ANTIBODY NATIVE: CPT

## 2024-10-29 PROCEDURE — 36430 TRANSFUSION BLD/BLD COMPNT: CPT

## 2024-10-29 PROCEDURE — 71045 X-RAY EXAM CHEST 1 VIEW: CPT

## 2024-10-29 PROCEDURE — 94761 N-INVAS EAR/PLS OXIMETRY MLT: CPT

## 2024-10-29 PROCEDURE — 06HY33Z INSERTION OF INFUSION DEVICE INTO LOWER VEIN, PERCUTANEOUS APPROACH: ICD-10-PCS | Performed by: INTERNAL MEDICINE

## 2024-10-29 PROCEDURE — 36415 COLL VENOUS BLD VENIPUNCTURE: CPT

## 2024-10-29 PROCEDURE — 86900 BLOOD TYPING SEROLOGIC ABO: CPT

## 2024-10-29 PROCEDURE — 80048 BASIC METABOLIC PNL TOTAL CA: CPT

## 2024-10-29 PROCEDURE — 86160 COMPLEMENT ANTIGEN: CPT

## 2024-10-29 PROCEDURE — 85014 HEMATOCRIT: CPT

## 2024-10-29 PROCEDURE — 30233R1 TRANSFUSION OF NONAUTOLOGOUS PLATELETS INTO PERIPHERAL VEIN, PERCUTANEOUS APPROACH: ICD-10-PCS

## 2024-10-29 PROCEDURE — 36556 INSERT NON-TUNNEL CV CATH: CPT

## 2024-10-29 PROCEDURE — 84155 ASSAY OF PROTEIN SERUM: CPT

## 2024-10-29 PROCEDURE — 4A133B1 MONITORING OF ARTERIAL PRESSURE, PERIPHERAL, PERCUTANEOUS APPROACH: ICD-10-PCS | Performed by: INTERNAL MEDICINE

## 2024-10-29 PROCEDURE — 80074 ACUTE HEPATITIS PANEL: CPT

## 2024-10-29 PROCEDURE — 93306 TTE W/DOPPLER COMPLETE: CPT | Performed by: INTERNAL MEDICINE

## 2024-10-29 PROCEDURE — 37799 UNLISTED PX VASCULAR SURGERY: CPT

## 2024-10-29 PROCEDURE — 85018 HEMOGLOBIN: CPT

## 2024-10-29 PROCEDURE — 83735 ASSAY OF MAGNESIUM: CPT

## 2024-10-29 PROCEDURE — 2500000003 HC RX 250 WO HCPCS: Performed by: STUDENT IN AN ORGANIZED HEALTH CARE EDUCATION/TRAINING PROGRAM

## 2024-10-29 PROCEDURE — 03HY32Z INSERTION OF MONITORING DEVICE INTO UPPER ARTERY, PERCUTANEOUS APPROACH: ICD-10-PCS | Performed by: INTERNAL MEDICINE

## 2024-10-29 PROCEDURE — 94002 VENT MGMT INPAT INIT DAY: CPT

## 2024-10-29 PROCEDURE — 89220 SPUTUM SPECIMEN COLLECTION: CPT

## 2024-10-29 RX ORDER — 0.9 % SODIUM CHLORIDE 0.9 %
1000 INTRAVENOUS SOLUTION INTRAVENOUS ONCE
Status: COMPLETED | OUTPATIENT
Start: 2024-10-29 | End: 2024-10-29

## 2024-10-29 RX ORDER — PHYTONADIONE 5 MG/1
10 TABLET ORAL DAILY
Status: COMPLETED | OUTPATIENT
Start: 2024-10-29 | End: 2024-10-31

## 2024-10-29 RX ORDER — FENTANYL CITRATE 50 UG/ML
50 INJECTION, SOLUTION INTRAMUSCULAR; INTRAVENOUS
Status: DISCONTINUED | OUTPATIENT
Start: 2024-10-29 | End: 2024-11-13 | Stop reason: HOSPADM

## 2024-10-29 RX ORDER — POTASSIUM CHLORIDE 7.45 MG/ML
10 INJECTION INTRAVENOUS ONCE
Status: DISCONTINUED | OUTPATIENT
Start: 2024-10-29 | End: 2024-10-30

## 2024-10-29 RX ORDER — POTASSIUM CHLORIDE 29.8 MG/ML
20 INJECTION INTRAVENOUS PRN
Status: DISCONTINUED | OUTPATIENT
Start: 2024-10-29 | End: 2024-10-30

## 2024-10-29 RX ORDER — INSULIN LISPRO 100 [IU]/ML
0-8 INJECTION, SOLUTION INTRAVENOUS; SUBCUTANEOUS
Status: DISCONTINUED | OUTPATIENT
Start: 2024-10-29 | End: 2024-10-30

## 2024-10-29 RX ORDER — HEPARIN SODIUM 1000 [USP'U]/ML
1500 INJECTION, SOLUTION INTRAVENOUS; SUBCUTANEOUS PRN
Status: DISCONTINUED | OUTPATIENT
Start: 2024-10-29 | End: 2024-11-08

## 2024-10-29 RX ORDER — LEVOTHYROXINE SODIUM 25 UG/1
12.5 TABLET ORAL DAILY
Status: DISCONTINUED | OUTPATIENT
Start: 2024-10-29 | End: 2024-11-13 | Stop reason: HOSPADM

## 2024-10-29 RX ORDER — HEPARIN SODIUM 1000 [USP'U]/ML
1600 INJECTION, SOLUTION INTRAVENOUS; SUBCUTANEOUS PRN
Status: DISCONTINUED | OUTPATIENT
Start: 2024-10-29 | End: 2024-11-08

## 2024-10-29 RX ORDER — FENTANYL CITRATE 50 UG/ML
50 INJECTION, SOLUTION INTRAMUSCULAR; INTRAVENOUS ONCE
Status: DISCONTINUED | OUTPATIENT
Start: 2024-10-29 | End: 2024-10-30

## 2024-10-29 RX ORDER — DIGOXIN 125 MCG
250 TABLET ORAL ONCE
Status: COMPLETED | OUTPATIENT
Start: 2024-10-29 | End: 2024-10-30

## 2024-10-29 RX ORDER — FENTANYL CITRATE 50 UG/ML
50 INJECTION, SOLUTION INTRAMUSCULAR; INTRAVENOUS ONCE
Status: COMPLETED | OUTPATIENT
Start: 2024-10-29 | End: 2024-10-29

## 2024-10-29 RX ORDER — NOREPINEPHRINE BITARTRATE 0.06 MG/ML
1-100 INJECTION, SOLUTION INTRAVENOUS CONTINUOUS
Status: DISCONTINUED | OUTPATIENT
Start: 2024-10-29 | End: 2024-11-05

## 2024-10-29 RX ORDER — CALCIUM GLUCONATE 20 MG/ML
2000 INJECTION, SOLUTION INTRAVENOUS ONCE
Status: COMPLETED | OUTPATIENT
Start: 2024-10-29 | End: 2024-10-30

## 2024-10-29 RX ORDER — SODIUM CHLORIDE 9 MG/ML
INJECTION, SOLUTION INTRAVENOUS PRN
Status: DISCONTINUED | OUTPATIENT
Start: 2024-10-29 | End: 2024-10-30

## 2024-10-29 RX ORDER — POTASSIUM CHLORIDE 7.45 MG/ML
10 INJECTION INTRAVENOUS PRN
Status: DISCONTINUED | OUTPATIENT
Start: 2024-10-29 | End: 2024-10-30

## 2024-10-29 RX ADMIN — SODIUM CHLORIDE: 9 INJECTION, SOLUTION INTRAVENOUS at 01:32

## 2024-10-29 RX ADMIN — Medication 300 MCG/MIN: at 18:34

## 2024-10-29 RX ADMIN — SODIUM CHLORIDE, PRESERVATIVE FREE 10 ML: 5 INJECTION INTRAVENOUS at 07:52

## 2024-10-29 RX ADMIN — PHYTONADIONE 10 MG: 5 TABLET ORAL at 15:09

## 2024-10-29 RX ADMIN — SODIUM BICARBONATE: 84 INJECTION, SOLUTION INTRAVENOUS at 11:27

## 2024-10-29 RX ADMIN — INSULIN LISPRO 2 UNITS: 100 INJECTION, SOLUTION INTRAVENOUS; SUBCUTANEOUS at 21:02

## 2024-10-29 RX ADMIN — SODIUM CHLORIDE: 9 INJECTION, SOLUTION INTRAVENOUS at 01:43

## 2024-10-29 RX ADMIN — Medication 60 MCG/MIN: at 01:28

## 2024-10-29 RX ADMIN — Medication 300 MCG/MIN: at 09:02

## 2024-10-29 RX ADMIN — SODIUM BICARBONATE: 84 INJECTION, SOLUTION INTRAVENOUS at 20:38

## 2024-10-29 RX ADMIN — VASOPRESSIN 0.03 UNITS/MIN: 0.2 INJECTION INTRAVENOUS at 09:53

## 2024-10-29 RX ADMIN — FENTANYL CITRATE 50 MCG: 50 INJECTION, SOLUTION INTRAMUSCULAR; INTRAVENOUS at 07:46

## 2024-10-29 RX ADMIN — HYDROCORTISONE SODIUM SUCCINATE 100 MG: 100 INJECTION, POWDER, FOR SOLUTION INTRAMUSCULAR; INTRAVENOUS at 22:00

## 2024-10-29 RX ADMIN — Medication 300 MCG/MIN: at 06:02

## 2024-10-29 RX ADMIN — FENTANYL CITRATE 50 MCG: 50 INJECTION, SOLUTION INTRAMUSCULAR; INTRAVENOUS at 11:11

## 2024-10-29 RX ADMIN — POTASSIUM CHLORIDE 10 MEQ: 7.46 INJECTION, SOLUTION INTRAVENOUS at 01:35

## 2024-10-29 RX ADMIN — LINEZOLID 600 MG: 600 INJECTION, SOLUTION INTRAVENOUS at 01:45

## 2024-10-29 RX ADMIN — Medication 300 MCG/MIN: at 12:14

## 2024-10-29 RX ADMIN — MEROPENEM 1000 MG: 1 INJECTION INTRAVENOUS at 20:47

## 2024-10-29 RX ADMIN — SODIUM CHLORIDE, PRESERVATIVE FREE 40 MG: 5 INJECTION INTRAVENOUS at 09:32

## 2024-10-29 RX ADMIN — FENTANYL CITRATE 50 MCG: 50 INJECTION, SOLUTION INTRAMUSCULAR; INTRAVENOUS at 22:07

## 2024-10-29 RX ADMIN — SODIUM BICARBONATE: 84 INJECTION, SOLUTION INTRAVENOUS at 10:15

## 2024-10-29 RX ADMIN — PIPERACILLIN SODIUM AND TAZOBACTAM SODIUM 3375 MG: 3; .375 INJECTION, SOLUTION INTRAVENOUS at 02:46

## 2024-10-29 RX ADMIN — VASOPRESSIN 0.03 UNITS/MIN: 0.2 INJECTION INTRAVENOUS at 20:51

## 2024-10-29 RX ADMIN — ACETAMINOPHEN 650 MG: 325 TABLET ORAL at 22:02

## 2024-10-29 RX ADMIN — SODIUM CHLORIDE 1000 ML: 9 INJECTION, SOLUTION INTRAVENOUS at 13:59

## 2024-10-29 RX ADMIN — LEVOTHYROXINE SODIUM 12.5 MCG: 25 TABLET ORAL at 15:08

## 2024-10-29 RX ADMIN — SODIUM CHLORIDE, PRESERVATIVE FREE 10 ML: 5 INJECTION INTRAVENOUS at 20:39

## 2024-10-29 RX ADMIN — SODIUM CHLORIDE 13.3 UNITS/HR: 9 INJECTION, SOLUTION INTRAVENOUS at 08:15

## 2024-10-29 RX ADMIN — VASOPRESSIN 0.03 UNITS/MIN: 0.2 INJECTION INTRAVENOUS at 01:22

## 2024-10-29 RX ADMIN — CALCIUM GLUCONATE 2000 MG: 20 INJECTION, SOLUTION INTRAVENOUS at 21:59

## 2024-10-29 RX ADMIN — Medication 4 MCG/MIN: at 01:40

## 2024-10-29 RX ADMIN — SODIUM CHLORIDE, PRESERVATIVE FREE 10 ML: 5 INJECTION INTRAVENOUS at 02:38

## 2024-10-29 RX ADMIN — POTASSIUM CHLORIDE 20 MEQ: 29.8 INJECTION, SOLUTION INTRAVENOUS at 02:36

## 2024-10-29 RX ADMIN — Medication 300 MCG/MIN: at 15:42

## 2024-10-29 RX ADMIN — FENTANYL CITRATE 50 MCG: 50 INJECTION, SOLUTION INTRAMUSCULAR; INTRAVENOUS at 13:52

## 2024-10-29 RX ADMIN — HEPARIN SODIUM 5000 UNITS: 5000 INJECTION INTRAVENOUS; SUBCUTANEOUS at 01:48

## 2024-10-29 RX ADMIN — POTASSIUM CHLORIDE 10 MEQ: 7.46 INJECTION, SOLUTION INTRAVENOUS at 05:17

## 2024-10-29 RX ADMIN — Medication 8 MCG/MIN: at 05:55

## 2024-10-29 RX ADMIN — Medication 14 MCG/MIN: at 22:00

## 2024-10-29 RX ADMIN — HYDROCORTISONE SODIUM SUCCINATE 100 MG: 100 INJECTION, POWDER, FOR SOLUTION INTRAMUSCULAR; INTRAVENOUS at 05:58

## 2024-10-29 RX ADMIN — Medication 300 MCG/MIN: at 21:50

## 2024-10-29 RX ADMIN — HEPARIN SODIUM 1500 UNITS: 1000 INJECTION INTRAVENOUS; SUBCUTANEOUS at 15:07

## 2024-10-29 RX ADMIN — MIDAZOLAM HYDROCHLORIDE 1 MG: 1 INJECTION, SOLUTION INTRAMUSCULAR; INTRAVENOUS at 22:32

## 2024-10-29 RX ADMIN — SODIUM CHLORIDE, PRESERVATIVE FREE 40 MG: 5 INJECTION INTRAVENOUS at 01:48

## 2024-10-29 RX ADMIN — Medication 50 MCG/HR: at 12:23

## 2024-10-29 RX ADMIN — MEROPENEM 1000 MG: 1 INJECTION INTRAVENOUS at 09:31

## 2024-10-29 RX ADMIN — HYDROCORTISONE SODIUM SUCCINATE 100 MG: 100 INJECTION, POWDER, FOR SOLUTION INTRAMUSCULAR; INTRAVENOUS at 15:09

## 2024-10-29 RX ADMIN — SODIUM CHLORIDE, PRESERVATIVE FREE 40 MG: 5 INJECTION INTRAVENOUS at 20:40

## 2024-10-29 RX ADMIN — Medication 30 MCG/MIN: at 00:02

## 2024-10-29 RX ADMIN — FENTANYL CITRATE 50 MCG: 50 INJECTION, SOLUTION INTRAMUSCULAR; INTRAVENOUS at 00:13

## 2024-10-29 RX ADMIN — HEPARIN SODIUM 1600 UNITS: 1000 INJECTION INTRAVENOUS; SUBCUTANEOUS at 15:08

## 2024-10-29 ASSESSMENT — PULMONARY FUNCTION TESTS
PIF_VALUE: 19
PIF_VALUE: 13
PIF_VALUE: 20
PIF_VALUE: 19
PIF_VALUE: 15
PIF_VALUE: 19
PIF_VALUE: 20
PIF_VALUE: 14
PIF_VALUE: 20
PIF_VALUE: 14
PIF_VALUE: 16
PIF_VALUE: 19
PIF_VALUE: 21
PIF_VALUE: 13
PIF_VALUE: 19
PIF_VALUE: 19
PIF_VALUE: 15
PIF_VALUE: 9
PIF_VALUE: 19
PIF_VALUE: 14
PIF_VALUE: 21
PIF_VALUE: 15
PIF_VALUE: 20
PIF_VALUE: 18
PIF_VALUE: 15
PIF_VALUE: 23
PIF_VALUE: 18
PIF_VALUE: 19
PIF_VALUE: 17
PIF_VALUE: 19
PIF_VALUE: 11
PIF_VALUE: 21
PIF_VALUE: 19
PIF_VALUE: 22
PIF_VALUE: 20
PIF_VALUE: 21
PIF_VALUE: 19
PIF_VALUE: 19

## 2024-10-29 NOTE — CONSENT
Informed Consent for Blood Component Transfusion Note    I have discussed with the daughter the rationale for blood component transfusion; its benefits in treating or preventing fatigue, organ damage, or death; and its risk which includes mild transfusion reactions, rare risk of blood borne infection, or more serious but rare reactions. I have discussed the alternatives to transfusion, including the risk and consequences of not receiving transfusion. The daughter had an opportunity to ask questions and had agreed to proceed with transfusion of blood components.    Electronically signed by Emery Doe MD on 10/29/24 at 2:32 PM EDT

## 2024-10-29 NOTE — H&P
Critical Care - History and Physical Examination    Patient's name:  Claudia Malone  Medical Record Number: [id number]  Patient's account/billing number: [id number]  Patient's YOB: 1945  Age: 79 y.o.  Date of Admission: 10/28/2024  5:38 PM  Date of History and Physical Examination: 10/28/2024      Primary Care Physician: Aime Barrett MD  Attending Physician: Dr. Maximus Dale    Code Status: Full Code    Chief complaint:   Chief Complaint   Patient presents with    Altered Mental Status         HISTORY OF PRESENT ILLNESS:      History was obtained from chart review and family.      Claudia Malone is a 79 y.o. 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 Per family,  Last well-known was on Wednesday when the patient spoke to her neighbor.  Today patient's brother arrived at her home and found patient lying down on the kitchen, moaning, surrounded by emesis and feces.  EMS was called and patient was intubated for airway protection, was hypotensive and started on epi infusion and was transferred to emergency department.    Upon arrival to ED, patient was found to be's hypothermic with core temperature 28.3 C.  Placed on warmer.  Patient initially had feeble thready pulses. patient was hypotensive and was started on Levophed.  Pertinent labs at admission JUAN ANTONIO with , creatinine 2.3.  Lactate 3.  Blood glucose 263.  Elevated CK15 64, elevated myoglobin 9165.  .  Beta hydroxybutyrate 6.15.  Urinalysis infective.  CTA chest abdomen pelvis was done which was negative for pulmonary embolism, acute abnormality.    On my examination, patient was intubated and sedated.  On Levophed and epinephrine infusions.  Not following commands.  Hypothermic.    Upon further discussion with the family, patient has

## 2024-10-29 NOTE — PROCEDURES
PROCEDURE NOTE - CENTRAL VENOUS LINE PLACEMENT    PATIENT NAME: Claudia Malone  MEDICAL RECORD NO. [id number]  DATE: 10/29/2024  ATTENDING PHYSICIAN: Dr Dale    PREOPERATIVE DIAGNOSIS:  Need for IV access  POSTOPERATIVE DIAGNOSIS:  Same  PROCEDURE PERFORMED:  Right Internal Jugular Vein Central Line Insertion  PERFORMING PHYSICIAN: Theo Castañeda MD  ANESTHESIA:  Local utilizing 1% lidocaine  ESTIMATED BLOOD LOSS:  Less than 25 ml  COMPLICATIONS:  None immediately appreciated.     DISCUSSION:  Claudia Malone is a 79 y.o.-year-old female who requires central IV access. The history and physical examination were reviewed and confirmed.  The diagnoses, proposed procedure, risks, possible complications, benefits and alternatives were discussed with the patient or family. She was given the opportunity to ask questions, and once answered, informed consent was obtained from family.  The patient was then prepared for the procedure.    PROCEDURE:  A timeout was initiated by the bedside nurse and was confirmed by those present.  The patient was placed in a supine position. The skin overlying the Right Internal Jugular Vein was prepped with chlorhexadine and draped in sterile fashion. The skin was infiltrated with local anesthetic. The vessel and surrounding anatomy was visualized using ultrasound. Through the anesthetized region, the introducer needle was inserted into the internal jugular vein returning dark red non pulsatile blood. A guidewire was placed through the center of the needle with no resistance. Ultrasound confirmed presence of wire in the vein. A small incision made in the skin with a #11 scalpel blade. The dilator was inserted into the skin and vein over guidewire using Seldinger technique. The dilator was then removed and the catheter was placed in the vein over the guidewire using Seldinger technique. The guidewire was then removed and all ports aspirated and flushed appropriately.

## 2024-10-29 NOTE — ED PROVIDER NOTES
[facility]  Emergency Department Encounter  Emergency Medicine Resident     Pt Name:Claudia Malone  MRN: [id number]  Birthdate 1945  Date of evaluation: 10/29/24  PCP:  Aime Barrett MD  Note Started: 1:18 AM EDT      CHIEF COMPLAINT       Chief Complaint   Patient presents with    Altered Mental Status       HISTORY OF PRESENT ILLNESS  (Location/Symptom, Timing/Onset, Context/Setting, Quality, Duration, Modifying Factors, Severity.)      Claudia Malone is a 79 y.o. female who presents with altered mentation.  Per EMS report patient was found down in her home, last known well October 23.  They report that patient was found down in her home on the ground for an unknown period of time.  They report that the home had signs of GI bleeding with stool and vomit scattered throughout the house that appeared dark in quality.  They report that they found the patient on the ground moaning but unresponsive to pain.  Patient was notably hypotensive and not appearing to maintain airway prompting intubation at the scene.  They report that her blood pressure was very low after being given vecuronium for intubation and ketamine for sedation following.  They report they did give epinephrine drip for hypotension with improvement.  They were able to obtain patient's birthdate and name but no medical history or medications.    PAST MEDICAL / SURGICAL / SOCIAL / FAMILY HISTORY      has no past medical history on file.     has no past surgical history on file.    Social History     Socioeconomic History    Marital status: Single     Spouse name: Not on file    Number of children: Not on file    Years of education: Not on file    Highest education level: Not on file   Occupational History    Not on file   Tobacco Use    Smoking status: Not on file    Smokeless tobacco: Not on file   Substance and Sexual Activity    Alcohol use: Not on file    Drug use: Not on file    Sexual activity: Not on file   Other Topics Concern

## 2024-10-29 NOTE — PROCEDURES
PATIENT NAME: Claudia Malone  MEDICAL RECORD NO. [id number]  DATE: 10/29/2024  ATTENDING PHYSICIAN:  Dr Murdock  PRIMARY CARE PHYSICIAN: No primary provider on file.    PREOPERATIVE DIAGNOSIS:  Need for blood pressure monitoring  POSTOPERATIVE DIAGNOSIS:  Same  PROCEDURE PERFORMED:   Arterial Line Insertion  PERFORMING PHYSICIAN:  Theo Castañeda MD  ANESTHESIA:  Sedation for mechanical ventilation  ESTIMATED BLOOD LOSS:  Less than 25 ml  COMPLICATIONS:  None immediately appreciated.     DISCUSSION:  Claudia Malone is a 79 y.o. female who requires invasive pressure monitoring. The history and physical examination were reviewed and confirmed.  Given the patient's condition, emergent consent was implied.    PROCEDURE:  A timeout was initiated by the bedside nurse and was confirmed by those present.  The patient was placed in a supine position. The skin overlying the left radial artery was prepped with chlorhexadine. Through this region, the introducer needle through catheter was inserted into left radial artery until pulsatile bright blood was seen in collection tubing. Guidewire was advanced with no resistance. Catheter was advanced into the artery, wire was pulled with brisk bleeding noted. Pressure monitoring setup was connected to the catheter, it aspirated and flushed easily. The catheter was secured to the wrist with 3-0 silk.     No immediate complication was evident.  All sponge, instrument and needle counts were correct at the completion of the procedure.    Theo Castañeda MD  PGY-2, Internal Medicine Resident  [facility] [place]         10/29/2024, 3:09 AM

## 2024-10-30 PROBLEM — E87.20 METABOLIC ACIDOSIS: Status: ACTIVE | Noted: 2024-10-28

## 2024-10-30 PROBLEM — K75.81 NASH (NONALCOHOLIC STEATOHEPATITIS): Status: ACTIVE | Noted: 2024-10-30

## 2024-10-30 PROBLEM — Z86.39 HISTORY OF TYPE 2 DIABETES MELLITUS: Status: ACTIVE | Noted: 2024-10-30

## 2024-10-30 LAB
ANION GAP SERPL CALCULATED.3IONS-SCNC: 16 MMOL/L (ref 9–16)
ANION GAP SERPL CALCULATED.3IONS-SCNC: 22 MMOL/L (ref 9–16)
ANION GAP SERPL CALCULATED.3IONS-SCNC: 24 MMOL/L (ref 9–16)
BASOPHILS # BLD: 0 K/UL (ref 0–0.2)
BASOPHILS NFR BLD: 0 % (ref 0–2)
BLOOD BANK BLOOD PRODUCT EXPIRATION DATE: NORMAL
BLOOD BANK BLOOD PRODUCT EXPIRATION DATE: NORMAL
BLOOD BANK DISPENSE STATUS: NORMAL
BLOOD BANK DISPENSE STATUS: NORMAL
BLOOD BANK ISBT PRODUCT BLOOD TYPE: 8400
BLOOD BANK ISBT PRODUCT BLOOD TYPE: 8400
BLOOD BANK PRODUCT CODE: NORMAL
BLOOD BANK PRODUCT CODE: NORMAL
BLOOD BANK UNIT TYPE AND RH: NORMAL
BLOOD BANK UNIT TYPE AND RH: NORMAL
BPU ID: NORMAL
BPU ID: NORMAL
BUN SERPL-MCNC: 113 MG/DL (ref 8–23)
BUN SERPL-MCNC: 115 MG/DL (ref 8–23)
BUN SERPL-MCNC: 81 MG/DL (ref 8–23)
CA-I BLD-SCNC: 0.92 MMOL/L (ref 1.13–1.33)
CA-I BLD-SCNC: 1.04 MMOL/L (ref 1.13–1.33)
CALCIUM SERPL-MCNC: 7.2 MG/DL (ref 8.6–10.4)
CALCIUM SERPL-MCNC: 7.2 MG/DL (ref 8.6–10.4)
CALCIUM SERPL-MCNC: 7.6 MG/DL (ref 8.6–10.4)
CHLORIDE SERPL-SCNC: 103 MMOL/L (ref 98–107)
CHLORIDE SERPL-SCNC: 104 MMOL/L (ref 98–107)
CHLORIDE SERPL-SCNC: 98 MMOL/L (ref 98–107)
CK SERPL-CCNC: 1759 U/L (ref 26–192)
CO2 SERPL-SCNC: 17 MMOL/L (ref 20–31)
CO2 SERPL-SCNC: 18 MMOL/L (ref 20–31)
CO2 SERPL-SCNC: 24 MMOL/L (ref 20–31)
COMPONENT: NORMAL
COMPONENT: NORMAL
CREAT SERPL-MCNC: 2 MG/DL (ref 0.6–0.9)
CREAT SERPL-MCNC: 2.8 MG/DL (ref 0.6–0.9)
CREAT SERPL-MCNC: 2.9 MG/DL (ref 0.6–0.9)
EKG ATRIAL RATE: 129 BPM
EKG ATRIAL RATE: 264 BPM
EKG ATRIAL RATE: 280 BPM
EKG P AXIS: -96 DEGREES
EKG P AXIS: 59 DEGREES
EKG Q-T INTERVAL: 224 MS
EKG Q-T INTERVAL: 332 MS
EKG Q-T INTERVAL: 386 MS
EKG QRS DURATION: 64 MS
EKG QRS DURATION: 66 MS
EKG QRS DURATION: 70 MS
EKG QTC CALCULATION (BAZETT): 331 MS
EKG QTC CALCULATION (BAZETT): 506 MS
EKG QTC CALCULATION (BAZETT): 559 MS
EKG R AXIS: 35 DEGREES
EKG R AXIS: 45 DEGREES
EKG R AXIS: 48 DEGREES
EKG T AXIS: -156 DEGREES
EKG T AXIS: -95 DEGREES
EKG T AXIS: 0 DEGREES
EKG VENTRICULAR RATE: 126 BPM
EKG VENTRICULAR RATE: 132 BPM
EKG VENTRICULAR RATE: 140 BPM
EOSINOPHIL # BLD: 0 K/UL (ref 0–0.4)
EOSINOPHILS RELATIVE PERCENT: 0 % (ref 1–4)
ERYTHROCYTE [DISTWIDTH] IN BLOOD BY AUTOMATED COUNT: 14.6 % (ref 11.8–14.4)
FIO2: 40
GFR, ESTIMATED: 16 ML/MIN/1.73M2
GFR, ESTIMATED: 17 ML/MIN/1.73M2
GFR, ESTIMATED: 25 ML/MIN/1.73M2
GLUCOSE BLD-MCNC: 273 MG/DL (ref 65–105)
GLUCOSE BLD-MCNC: 286 MG/DL (ref 74–100)
GLUCOSE BLD-MCNC: 294 MG/DL (ref 65–105)
GLUCOSE BLD-MCNC: 295 MG/DL (ref 65–105)
GLUCOSE BLD-MCNC: 302 MG/DL (ref 65–105)
GLUCOSE BLD-MCNC: 304 MG/DL (ref 65–105)
GLUCOSE BLD-MCNC: 337 MG/DL (ref 65–105)
GLUCOSE SERPL-MCNC: 296 MG/DL (ref 74–99)
GLUCOSE SERPL-MCNC: 318 MG/DL (ref 74–99)
GLUCOSE SERPL-MCNC: 322 MG/DL (ref 74–99)
HCT VFR BLD AUTO: 29.6 % (ref 36.3–47.1)
HCT VFR BLD AUTO: 30.5 % (ref 36.3–47.1)
HCT VFR BLD AUTO: 31.6 % (ref 36.3–47.1)
HCT VFR BLD AUTO: 32.4 % (ref 36.3–47.1)
HGB BLD-MCNC: 10.5 G/DL (ref 11.9–15.1)
HGB BLD-MCNC: 10.7 G/DL (ref 11.9–15.1)
HGB BLD-MCNC: 11.1 G/DL (ref 11.9–15.1)
HGB BLD-MCNC: 11.2 G/DL (ref 11.9–15.1)
IMM GRANULOCYTES # BLD AUTO: 0.68 K/UL (ref 0–0.3)
IMM GRANULOCYTES NFR BLD: 3 %
INR PPP: 2.2
LACTIC ACID, WHOLE BLOOD: 2.2 MMOL/L (ref 0.7–2.1)
LACTIC ACID, WHOLE BLOOD: 3.2 MMOL/L (ref 0.7–2.1)
LACTIC ACID, WHOLE BLOOD: 4 MMOL/L (ref 0.7–2.1)
LACTIC ACID, WHOLE BLOOD: 4.6 MMOL/L (ref 0.7–2.1)
LYMPHOCYTES NFR BLD: 1.37 K/UL (ref 1–4.8)
LYMPHOCYTES RELATIVE PERCENT: 6 % (ref 24–44)
MCH RBC QN AUTO: 30.4 PG (ref 25.2–33.5)
MCHC RBC AUTO-ENTMCNC: 35.1 G/DL (ref 28.4–34.8)
MCV RBC AUTO: 86.6 FL (ref 82.6–102.9)
MICROORGANISM SPEC CULT: ABNORMAL
MICROORGANISM SPEC CULT: NORMAL
MICROORGANISM/AGENT SPEC: NORMAL
MODE: ABNORMAL
MONOCYTES NFR BLD: 1.6 K/UL (ref 0.1–0.8)
MONOCYTES NFR BLD: 7 % (ref 1–7)
MORPHOLOGY: ABNORMAL
MYOGLOBIN SERPL-MCNC: 2279 NG/ML (ref 25–58)
NEGATIVE BASE EXCESS, ART: 4.9 MMOL/L (ref 0–2)
NEUTROPHILS NFR BLD: 84 % (ref 36–66)
NEUTS SEG NFR BLD: 19.15 K/UL (ref 1.8–7.7)
NRBC BLD-RTO: 0.4 PER 100 WBC
NUCLEATED RED BLOOD CELLS: 1 PER 100 WBC
O2 DELIVERY DEVICE: ABNORMAL
PHOSPHATE SERPL-MCNC: 4.5 MG/DL (ref 2.5–4.5)
PHOSPHATE SERPL-MCNC: 4.9 MG/DL (ref 2.5–4.5)
PHOSPHATE SERPL-MCNC: 6.6 MG/DL (ref 2.5–4.5)
PHOSPHATE SERPL-MCNC: 7 MG/DL (ref 2.5–4.5)
PLATELET # BLD AUTO: ABNORMAL K/UL (ref 138–453)
PLATELETS.RETICULATED NFR BLD AUTO: 9.9 % (ref 1.1–10.3)
POC HCO3: 19.4 MMOL/L (ref 21–28)
POC O2 SATURATION: 98.2 % (ref 94–98)
POC PCO2: 32.7 MM HG (ref 35–48)
POC PH: 7.38 (ref 7.35–7.45)
POC PO2: 108.3 MM HG (ref 83–108)
POTASSIUM SERPL-SCNC: 4.1 MMOL/L (ref 3.7–5.3)
POTASSIUM SERPL-SCNC: 4.5 MMOL/L (ref 3.7–5.3)
POTASSIUM SERPL-SCNC: 4.9 MMOL/L (ref 3.7–5.3)
PROTHROMBIN TIME: 24 SEC (ref 11.7–14.9)
RBC # BLD AUTO: 3.65 M/UL (ref 3.95–5.11)
SAMPLE SITE: ABNORMAL
SERVICE CMNT-IMP: ABNORMAL
SERVICE CMNT-IMP: ABNORMAL
SODIUM SERPL-SCNC: 138 MMOL/L (ref 136–145)
SODIUM SERPL-SCNC: 144 MMOL/L (ref 136–145)
SODIUM SERPL-SCNC: 144 MMOL/L (ref 136–145)
SPECIMEN DESCRIPTION: ABNORMAL
SPECIMEN DESCRIPTION: NORMAL
TRANSFUSION STATUS: NORMAL
TRANSFUSION STATUS: NORMAL
UNIT DIVISION: 0
UNIT DIVISION: 0
UNIT ISSUE DATE/TIME: NORMAL
UNIT ISSUE DATE/TIME: NORMAL
WBC OTHER # BLD: 22.8 K/UL (ref 3.5–11.3)

## 2024-10-30 PROCEDURE — 85055 RETICULATED PLATELET ASSAY: CPT

## 2024-10-30 PROCEDURE — 82947 ASSAY GLUCOSE BLOOD QUANT: CPT

## 2024-10-30 PROCEDURE — 93005 ELECTROCARDIOGRAM TRACING: CPT | Performed by: INTERNAL MEDICINE

## 2024-10-30 PROCEDURE — 6370000000 HC RX 637 (ALT 250 FOR IP)

## 2024-10-30 PROCEDURE — 94003 VENT MGMT INPAT SUBQ DAY: CPT

## 2024-10-30 PROCEDURE — 6360000002 HC RX W HCPCS

## 2024-10-30 PROCEDURE — 83874 ASSAY OF MYOGLOBIN: CPT

## 2024-10-30 PROCEDURE — 2500000003 HC RX 250 WO HCPCS: Performed by: STUDENT IN AN ORGANIZED HEALTH CARE EDUCATION/TRAINING PROGRAM

## 2024-10-30 PROCEDURE — 82803 BLOOD GASES ANY COMBINATION: CPT

## 2024-10-30 PROCEDURE — 2500000003 HC RX 250 WO HCPCS

## 2024-10-30 PROCEDURE — 99222 1ST HOSP IP/OBS MODERATE 55: CPT | Performed by: INTERNAL MEDICINE

## 2024-10-30 PROCEDURE — 2580000003 HC RX 258

## 2024-10-30 PROCEDURE — 2700000000 HC OXYGEN THERAPY PER DAY

## 2024-10-30 PROCEDURE — 93010 ELECTROCARDIOGRAM REPORT: CPT | Performed by: INTERNAL MEDICINE

## 2024-10-30 PROCEDURE — 83605 ASSAY OF LACTIC ACID: CPT

## 2024-10-30 PROCEDURE — 90935 HEMODIALYSIS ONE EVALUATION: CPT

## 2024-10-30 PROCEDURE — 99233 SBSQ HOSP IP/OBS HIGH 50: CPT | Performed by: INTERNAL MEDICINE

## 2024-10-30 PROCEDURE — 82550 ASSAY OF CK (CPK): CPT

## 2024-10-30 PROCEDURE — 6360000002 HC RX W HCPCS: Performed by: INTERNAL MEDICINE

## 2024-10-30 PROCEDURE — 85018 HEMOGLOBIN: CPT

## 2024-10-30 PROCEDURE — 2000000000 HC ICU R&B

## 2024-10-30 PROCEDURE — 2580000003 HC RX 258: Performed by: INTERNAL MEDICINE

## 2024-10-30 PROCEDURE — 84100 ASSAY OF PHOSPHORUS: CPT

## 2024-10-30 PROCEDURE — 80048 BASIC METABOLIC PNL TOTAL CA: CPT

## 2024-10-30 PROCEDURE — 94761 N-INVAS EAR/PLS OXIMETRY MLT: CPT

## 2024-10-30 PROCEDURE — 99291 CRITICAL CARE FIRST HOUR: CPT | Performed by: INTERNAL MEDICINE

## 2024-10-30 PROCEDURE — 85025 COMPLETE CBC W/AUTO DIFF WBC: CPT

## 2024-10-30 PROCEDURE — 85014 HEMATOCRIT: CPT

## 2024-10-30 PROCEDURE — 85610 PROTHROMBIN TIME: CPT

## 2024-10-30 PROCEDURE — 37799 UNLISTED PX VASCULAR SURGERY: CPT

## 2024-10-30 PROCEDURE — 82330 ASSAY OF CALCIUM: CPT

## 2024-10-30 RX ORDER — CALCIUM GLUCONATE 20 MG/ML
2000 INJECTION, SOLUTION INTRAVENOUS ONCE
Status: COMPLETED | OUTPATIENT
Start: 2024-10-30 | End: 2024-10-31

## 2024-10-30 RX ORDER — INSULIN LISPRO 100 [IU]/ML
0-16 INJECTION, SOLUTION INTRAVENOUS; SUBCUTANEOUS EVERY 6 HOURS
Status: DISCONTINUED | OUTPATIENT
Start: 2024-10-30 | End: 2024-11-13 | Stop reason: HOSPADM

## 2024-10-30 RX ORDER — INSULIN GLARGINE 100 [IU]/ML
20 INJECTION, SOLUTION SUBCUTANEOUS DAILY
Status: DISCONTINUED | OUTPATIENT
Start: 2024-10-30 | End: 2024-11-02

## 2024-10-30 RX ORDER — CALCIUM GLUCONATE 20 MG/ML
2000 INJECTION, SOLUTION INTRAVENOUS ONCE
Status: COMPLETED | OUTPATIENT
Start: 2024-10-30 | End: 2024-10-30

## 2024-10-30 RX ORDER — LACTULOSE 10 G/15ML
10 SOLUTION ORAL 3 TIMES DAILY
Status: DISCONTINUED | OUTPATIENT
Start: 2024-10-30 | End: 2024-11-13 | Stop reason: HOSPADM

## 2024-10-30 RX ORDER — MIDODRINE HYDROCHLORIDE 5 MG/1
10 TABLET ORAL
Status: DISCONTINUED | OUTPATIENT
Start: 2024-10-30 | End: 2024-11-04

## 2024-10-30 RX ADMIN — PHENYLEPHRINE HYDROCHLORIDE 300 MCG/MIN: 10 INJECTION INTRAVENOUS at 05:23

## 2024-10-30 RX ADMIN — Medication 300 MCG/MIN: at 03:18

## 2024-10-30 RX ADMIN — INSULIN GLARGINE 20 UNITS: 100 INJECTION, SOLUTION SUBCUTANEOUS at 08:16

## 2024-10-30 RX ADMIN — HYDROCORTISONE SODIUM SUCCINATE 100 MG: 100 INJECTION, POWDER, FOR SOLUTION INTRAMUSCULAR; INTRAVENOUS at 20:41

## 2024-10-30 RX ADMIN — HEPARIN SODIUM 1500 UNITS: 1000 INJECTION INTRAVENOUS; SUBCUTANEOUS at 13:15

## 2024-10-30 RX ADMIN — AMIODARONE HYDROCHLORIDE 0.5 MG/MIN: 1.8 INJECTION, SOLUTION INTRAVENOUS at 22:19

## 2024-10-30 RX ADMIN — SODIUM CHLORIDE, PRESERVATIVE FREE 40 MG: 5 INJECTION INTRAVENOUS at 08:07

## 2024-10-30 RX ADMIN — MIDODRINE HYDROCHLORIDE 10 MG: 5 TABLET ORAL at 08:06

## 2024-10-30 RX ADMIN — MIDODRINE HYDROCHLORIDE 10 MG: 5 TABLET ORAL at 16:21

## 2024-10-30 RX ADMIN — Medication 300 MCG/MIN: at 00:25

## 2024-10-30 RX ADMIN — CALCIUM GLUCONATE 2000 MG: 20 INJECTION, SOLUTION INTRAVENOUS at 22:27

## 2024-10-30 RX ADMIN — CALCIUM GLUCONATE 2000 MG: 20 INJECTION, SOLUTION INTRAVENOUS at 14:16

## 2024-10-30 RX ADMIN — HYDROCORTISONE SODIUM SUCCINATE 100 MG: 100 INJECTION, POWDER, FOR SOLUTION INTRAMUSCULAR; INTRAVENOUS at 14:17

## 2024-10-30 RX ADMIN — SODIUM CHLORIDE: 9 INJECTION, SOLUTION INTRAVENOUS at 22:22

## 2024-10-30 RX ADMIN — INSULIN LISPRO 12 UNITS: 100 INJECTION, SOLUTION INTRAVENOUS; SUBCUTANEOUS at 11:15

## 2024-10-30 RX ADMIN — AMIODARONE HYDROCHLORIDE 150 MG: 1.5 INJECTION, SOLUTION INTRAVENOUS at 09:39

## 2024-10-30 RX ADMIN — LACTULOSE 10 G: 20 SOLUTION ORAL at 09:07

## 2024-10-30 RX ADMIN — SODIUM CHLORIDE, PRESERVATIVE FREE 10 ML: 5 INJECTION INTRAVENOUS at 08:09

## 2024-10-30 RX ADMIN — SODIUM CHLORIDE, PRESERVATIVE FREE 10 ML: 5 INJECTION INTRAVENOUS at 20:52

## 2024-10-30 RX ADMIN — SODIUM BICARBONATE: 84 INJECTION, SOLUTION INTRAVENOUS at 18:58

## 2024-10-30 RX ADMIN — AMIODARONE HYDROCHLORIDE 1 MG/MIN: 1.8 INJECTION, SOLUTION INTRAVENOUS at 09:53

## 2024-10-30 RX ADMIN — SODIUM BICARBONATE: 84 INJECTION, SOLUTION INTRAVENOUS at 06:34

## 2024-10-30 RX ADMIN — Medication 8 MCG/MIN: at 22:08

## 2024-10-30 RX ADMIN — DIGOXIN 250 MCG: 125 TABLET ORAL at 00:15

## 2024-10-30 RX ADMIN — MIDODRINE HYDROCHLORIDE 10 MG: 5 TABLET ORAL at 11:15

## 2024-10-30 RX ADMIN — HYDROCORTISONE SODIUM SUCCINATE 100 MG: 100 INJECTION, POWDER, FOR SOLUTION INTRAMUSCULAR; INTRAVENOUS at 05:24

## 2024-10-30 RX ADMIN — INSULIN LISPRO 8 UNITS: 100 INJECTION, SOLUTION INTRAVENOUS; SUBCUTANEOUS at 16:21

## 2024-10-30 RX ADMIN — LEVOTHYROXINE SODIUM 12.5 MCG: 25 TABLET ORAL at 05:28

## 2024-10-30 RX ADMIN — HEPARIN SODIUM 1600 UNITS: 1000 INJECTION INTRAVENOUS; SUBCUTANEOUS at 13:15

## 2024-10-30 RX ADMIN — VASOPRESSIN 0.03 UNITS/MIN: 0.2 INJECTION INTRAVENOUS at 06:33

## 2024-10-30 RX ADMIN — INSULIN LISPRO 12 UNITS: 100 INJECTION, SOLUTION INTRAVENOUS; SUBCUTANEOUS at 05:36

## 2024-10-30 RX ADMIN — LACTULOSE 10 G: 20 SOLUTION ORAL at 20:41

## 2024-10-30 RX ADMIN — MEROPENEM 1000 MG: 1 INJECTION INTRAVENOUS at 08:47

## 2024-10-30 RX ADMIN — Medication 100 MCG/HR: at 05:50

## 2024-10-30 RX ADMIN — INSULIN LISPRO 8 UNITS: 100 INJECTION, SOLUTION INTRAVENOUS; SUBCUTANEOUS at 22:33

## 2024-10-30 RX ADMIN — PHENYLEPHRINE HYDROCHLORIDE 300 MCG/MIN: 10 INJECTION INTRAVENOUS at 10:48

## 2024-10-30 RX ADMIN — VASOPRESSIN 0.03 UNITS/MIN: 0.2 INJECTION INTRAVENOUS at 18:36

## 2024-10-30 RX ADMIN — LACTULOSE 10 G: 20 SOLUTION ORAL at 14:17

## 2024-10-30 RX ADMIN — Medication 2000 MG: at 14:17

## 2024-10-30 RX ADMIN — AMIODARONE HYDROCHLORIDE 0.5 MG/MIN: 1.8 INJECTION, SOLUTION INTRAVENOUS at 15:20

## 2024-10-30 RX ADMIN — PHYTONADIONE 10 MG: 5 TABLET ORAL at 09:41

## 2024-10-30 RX ADMIN — SODIUM CHLORIDE, PRESERVATIVE FREE 40 MG: 5 INJECTION INTRAVENOUS at 20:41

## 2024-10-30 ASSESSMENT — PULMONARY FUNCTION TESTS
PIF_VALUE: 21
PIF_VALUE: 21
PIF_VALUE: 19
PIF_VALUE: 23
PIF_VALUE: 11
PIF_VALUE: 22

## 2024-10-31 ENCOUNTER — APPOINTMENT (OUTPATIENT)
Dept: DIALYSIS | Age: 79
DRG: 870 | End: 2024-10-31
Payer: MEDICARE

## 2024-10-31 LAB
ALBUMIN PERCENT: 42 % (ref 45–65)
ALBUMIN SERPL-MCNC: 2 G/DL (ref 3.2–5.2)
ALBUMIN SERPL-MCNC: 2.2 G/DL (ref 3.5–5.2)
ALBUMIN SERPL-MCNC: 2.2 G/DL (ref 3.5–5.2)
ALBUMIN/GLOB SERPL: 0.7 {RATIO} (ref 1–2.5)
ALBUMIN/GLOB SERPL: 0.7 {RATIO} (ref 1–2.5)
ALP SERPL-CCNC: 106 U/L (ref 35–104)
ALP SERPL-CCNC: 110 U/L (ref 35–104)
ALPHA 2 PERCENT: 20 % (ref 6–13)
ALPHA1 GLOB SERPL ELPH-MCNC: 0.6 G/DL (ref 0.1–0.4)
ALPHA1 GLOB SERPL ELPH-MCNC: 12 % (ref 3–6)
ALPHA2 GLOB SERPL ELPH-MCNC: 1 G/DL (ref 0.5–0.9)
ALT SERPL-CCNC: 65 U/L (ref 10–35)
ALT SERPL-CCNC: 66 U/L (ref 10–35)
ANION GAP SERPL CALCULATED.3IONS-SCNC: 13 MMOL/L (ref 9–16)
ANION GAP SERPL CALCULATED.3IONS-SCNC: 17 MMOL/L (ref 9–16)
AST SERPL-CCNC: 102 U/L (ref 10–35)
AST SERPL-CCNC: 102 U/L (ref 10–35)
B-GLOBULIN SERPL ELPH-MCNC: 0.5 G/DL (ref 0.5–1.1)
B-GLOBULIN SERPL ELPH-MCNC: 11 % (ref 11–19)
BASOPHILS # BLD: 0 K/UL (ref 0–0.2)
BASOPHILS NFR BLD: 0 % (ref 0–2)
BILIRUB DIRECT SERPL-MCNC: 0.7 MG/DL (ref 0–0.2)
BILIRUB INDIRECT SERPL-MCNC: 0.2 MG/DL (ref 0–1)
BILIRUB SERPL-MCNC: 0.9 MG/DL (ref 0–1.2)
BILIRUB SERPL-MCNC: 0.9 MG/DL (ref 0–1.2)
BUN SERPL-MCNC: 88 MG/DL (ref 8–23)
BUN SERPL-MCNC: 91 MG/DL (ref 8–23)
CA-I BLD-SCNC: 1.05 MMOL/L (ref 1.13–1.33)
CALCIUM SERPL-MCNC: 8.2 MG/DL (ref 8.6–10.4)
CALCIUM SERPL-MCNC: 8.2 MG/DL (ref 8.6–10.4)
CHLORIDE SERPL-SCNC: 96 MMOL/L (ref 98–107)
CHLORIDE SERPL-SCNC: 96 MMOL/L (ref 98–107)
CK SERPL-CCNC: 701 U/L (ref 26–192)
CO2 SERPL-SCNC: 24 MMOL/L (ref 20–31)
CO2 SERPL-SCNC: 29 MMOL/L (ref 20–31)
CREAT SERPL-MCNC: 2 MG/DL (ref 0.6–0.9)
CREAT SERPL-MCNC: 2 MG/DL (ref 0.6–0.9)
EOSINOPHIL # BLD: 0 K/UL (ref 0–0.44)
EOSINOPHILS RELATIVE PERCENT: 0 % (ref 1–4)
ERYTHROCYTE [DISTWIDTH] IN BLOOD BY AUTOMATED COUNT: 14.4 % (ref 11.8–14.4)
EST. AVERAGE GLUCOSE BLD GHB EST-MCNC: 295 MG/DL
FIO2: 40
GAMMA GLOB SERPL ELPH-MCNC: 0.8 G/DL (ref 0.5–1.5)
GAMMA GLOBULIN %: 16 % (ref 9–20)
GFR, ESTIMATED: 25 ML/MIN/1.73M2
GFR, ESTIMATED: 25 ML/MIN/1.73M2
GLOBULIN SER CALC-MCNC: 3 G/DL
GLUCOSE BLD-MCNC: 178 MG/DL (ref 65–105)
GLUCOSE BLD-MCNC: 189 MG/DL (ref 65–105)
GLUCOSE BLD-MCNC: 229 MG/DL (ref 65–105)
GLUCOSE BLD-MCNC: 256 MG/DL (ref 65–105)
GLUCOSE BLD-MCNC: 272 MG/DL (ref 65–105)
GLUCOSE BLD-MCNC: 304 MG/DL (ref 65–105)
GLUCOSE BLD-MCNC: 348 MG/DL (ref 74–100)
GLUCOSE SERPL-MCNC: 275 MG/DL (ref 74–99)
GLUCOSE SERPL-MCNC: 340 MG/DL (ref 74–99)
HBA1C MFR BLD: 11.9 % (ref 4–6)
HCT VFR BLD AUTO: 29.4 % (ref 36.3–47.1)
HCT VFR BLD AUTO: 29.6 % (ref 36.3–47.1)
HCT VFR BLD AUTO: 29.7 % (ref 36.3–47.1)
HCT VFR BLD AUTO: 30 % (ref 36.3–47.1)
HGB BLD-MCNC: 10.2 G/DL (ref 11.9–15.1)
HGB BLD-MCNC: 10.3 G/DL (ref 11.9–15.1)
HGB BLD-MCNC: 10.5 G/DL (ref 11.9–15.1)
HGB BLD-MCNC: 10.5 G/DL (ref 11.9–15.1)
IMM GRANULOCYTES # BLD AUTO: 0.34 K/UL (ref 0–0.3)
IMM GRANULOCYTES NFR BLD: 2 %
ITYP INTERPRETATION: NORMAL
LACTIC ACID, WHOLE BLOOD: 1.9 MMOL/L (ref 0.7–2.1)
LACTIC ACID, WHOLE BLOOD: 2.3 MMOL/L (ref 0.7–2.1)
LACTIC ACID, WHOLE BLOOD: 2.4 MMOL/L (ref 0.7–2.1)
LACTIC ACID, WHOLE BLOOD: 2.4 MMOL/L (ref 0.7–2.1)
LYMPHOCYTES NFR BLD: 2.05 K/UL (ref 1.1–3.7)
LYMPHOCYTES RELATIVE PERCENT: 12 % (ref 24–43)
MCH RBC QN AUTO: 30.3 PG (ref 25.2–33.5)
MCHC RBC AUTO-ENTMCNC: 34.7 G/DL (ref 28.4–34.8)
MCV RBC AUTO: 87.2 FL (ref 82.6–102.9)
MONOCYTES NFR BLD: 0.68 K/UL (ref 0.1–1.2)
MONOCYTES NFR BLD: 4 % (ref 3–12)
MORPHOLOGY: NORMAL
MYOGLOBIN SERPL-MCNC: 591 NG/ML (ref 25–58)
NEUTROPHILS NFR BLD: 82 % (ref 36–65)
NEUTS SEG NFR BLD: 14.03 K/UL (ref 1.5–8.1)
NRBC BLD-RTO: 0.2 PER 100 WBC
PATH REV: NORMAL
PATHOLOGIST: ABNORMAL
PATIENT TEMP: 36.3
PHOSPHATE SERPL-MCNC: 3 MG/DL (ref 2.5–4.5)
PHOSPHATE SERPL-MCNC: 3.1 MG/DL (ref 2.5–4.5)
PHOSPHATE SERPL-MCNC: 4.1 MG/DL (ref 2.5–4.5)
PHOSPHATE SERPL-MCNC: 4.9 MG/DL (ref 2.5–4.5)
PLATELET # BLD AUTO: ABNORMAL K/UL (ref 138–453)
PLATELET, FLUORESCENCE: 23 K/UL (ref 138–453)
PLATELETS.RETICULATED NFR BLD AUTO: 10.1 % (ref 1.1–10.3)
POC HCO3: 30.1 MMOL/L (ref 21–28)
POC O2 SATURATION: 97.8 % (ref 94–98)
POC PCO2: 38.3 MM HG (ref 35–48)
POC PH: 7.5 (ref 7.35–7.45)
POC PO2: 92 MM HG (ref 83–108)
POSITIVE BASE EXCESS, ART: 6.5 MMOL/L (ref 0–3)
POTASSIUM SERPL-SCNC: 3.7 MMOL/L (ref 3.7–5.3)
POTASSIUM SERPL-SCNC: 4 MMOL/L (ref 3.7–5.3)
PROT PATTERN SERPL ELPH-IMP: ABNORMAL
PROT SERPL-MCNC: 4.9 G/DL (ref 6.6–8.7)
PROT SERPL-MCNC: 5.2 G/DL (ref 6.6–8.7)
PROT SERPL-MCNC: 5.2 G/DL (ref 6.6–8.7)
RBC # BLD AUTO: 3.37 M/UL (ref 3.95–5.11)
SODIUM SERPL-SCNC: 137 MMOL/L (ref 136–145)
SODIUM SERPL-SCNC: 138 MMOL/L (ref 136–145)
TOTAL PROT. SUM,%: 101 % (ref 98–102)
TOTAL PROT. SUM: 4.9 G/DL (ref 6.3–8.2)
WBC OTHER # BLD: 17.1 K/UL (ref 3.5–11.3)

## 2024-10-31 PROCEDURE — 82803 BLOOD GASES ANY COMBINATION: CPT

## 2024-10-31 PROCEDURE — 2700000000 HC OXYGEN THERAPY PER DAY

## 2024-10-31 PROCEDURE — 2580000003 HC RX 258

## 2024-10-31 PROCEDURE — 83874 ASSAY OF MYOGLOBIN: CPT

## 2024-10-31 PROCEDURE — 85025 COMPLETE CBC W/AUTO DIFF WBC: CPT

## 2024-10-31 PROCEDURE — 83036 HEMOGLOBIN GLYCOSYLATED A1C: CPT

## 2024-10-31 PROCEDURE — 99232 SBSQ HOSP IP/OBS MODERATE 35: CPT | Performed by: INTERNAL MEDICINE

## 2024-10-31 PROCEDURE — 80076 HEPATIC FUNCTION PANEL: CPT

## 2024-10-31 PROCEDURE — 2000000000 HC ICU R&B

## 2024-10-31 PROCEDURE — 99291 CRITICAL CARE FIRST HOUR: CPT | Performed by: INTERNAL MEDICINE

## 2024-10-31 PROCEDURE — 99233 SBSQ HOSP IP/OBS HIGH 50: CPT | Performed by: INTERNAL MEDICINE

## 2024-10-31 PROCEDURE — 6360000002 HC RX W HCPCS: Performed by: INTERNAL MEDICINE

## 2024-10-31 PROCEDURE — 2500000003 HC RX 250 WO HCPCS

## 2024-10-31 PROCEDURE — 37799 UNLISTED PX VASCULAR SURGERY: CPT

## 2024-10-31 PROCEDURE — 2500000003 HC RX 250 WO HCPCS: Performed by: STUDENT IN AN ORGANIZED HEALTH CARE EDUCATION/TRAINING PROGRAM

## 2024-10-31 PROCEDURE — 80048 BASIC METABOLIC PNL TOTAL CA: CPT

## 2024-10-31 PROCEDURE — 84100 ASSAY OF PHOSPHORUS: CPT

## 2024-10-31 PROCEDURE — 90935 HEMODIALYSIS ONE EVALUATION: CPT

## 2024-10-31 PROCEDURE — 82330 ASSAY OF CALCIUM: CPT

## 2024-10-31 PROCEDURE — 6360000002 HC RX W HCPCS

## 2024-10-31 PROCEDURE — 82947 ASSAY GLUCOSE BLOOD QUANT: CPT

## 2024-10-31 PROCEDURE — 85055 RETICULATED PLATELET ASSAY: CPT

## 2024-10-31 PROCEDURE — 85018 HEMOGLOBIN: CPT

## 2024-10-31 PROCEDURE — 85014 HEMATOCRIT: CPT

## 2024-10-31 PROCEDURE — 6370000000 HC RX 637 (ALT 250 FOR IP)

## 2024-10-31 PROCEDURE — 82550 ASSAY OF CK (CPK): CPT

## 2024-10-31 PROCEDURE — 80053 COMPREHEN METABOLIC PANEL: CPT

## 2024-10-31 PROCEDURE — 83605 ASSAY OF LACTIC ACID: CPT

## 2024-10-31 PROCEDURE — 2580000003 HC RX 258: Performed by: INTERNAL MEDICINE

## 2024-10-31 PROCEDURE — 94003 VENT MGMT INPAT SUBQ DAY: CPT

## 2024-10-31 PROCEDURE — 94761 N-INVAS EAR/PLS OXIMETRY MLT: CPT

## 2024-10-31 RX ORDER — BUMETANIDE 0.25 MG/ML
2 INJECTION, SOLUTION INTRAMUSCULAR; INTRAVENOUS 2 TIMES DAILY
Status: COMPLETED | OUTPATIENT
Start: 2024-10-31 | End: 2024-11-01

## 2024-10-31 RX ORDER — SODIUM CHLORIDE 9 MG/ML
INJECTION, SOLUTION INTRAVENOUS CONTINUOUS
Status: DISCONTINUED | OUTPATIENT
Start: 2024-10-31 | End: 2024-11-05

## 2024-10-31 RX ADMIN — AMIODARONE HYDROCHLORIDE 0.5 MG/MIN: 1.8 INJECTION, SOLUTION INTRAVENOUS at 20:31

## 2024-10-31 RX ADMIN — INSULIN GLARGINE 20 UNITS: 100 INJECTION, SOLUTION SUBCUTANEOUS at 09:05

## 2024-10-31 RX ADMIN — LACTULOSE 10 G: 20 SOLUTION ORAL at 08:37

## 2024-10-31 RX ADMIN — HYDROCORTISONE SODIUM SUCCINATE 100 MG: 100 INJECTION, POWDER, FOR SOLUTION INTRAMUSCULAR; INTRAVENOUS at 20:27

## 2024-10-31 RX ADMIN — SODIUM CHLORIDE, PRESERVATIVE FREE 10 ML: 5 INJECTION INTRAVENOUS at 20:24

## 2024-10-31 RX ADMIN — Medication 50 MCG/HR: at 06:24

## 2024-10-31 RX ADMIN — INSULIN LISPRO 4 UNITS: 100 INJECTION, SOLUTION INTRAVENOUS; SUBCUTANEOUS at 11:37

## 2024-10-31 RX ADMIN — HYDROCORTISONE SODIUM SUCCINATE 100 MG: 100 INJECTION, POWDER, FOR SOLUTION INTRAMUSCULAR; INTRAVENOUS at 05:10

## 2024-10-31 RX ADMIN — SODIUM CHLORIDE, PRESERVATIVE FREE 40 MG: 5 INJECTION INTRAVENOUS at 20:27

## 2024-10-31 RX ADMIN — LACTULOSE 10 G: 20 SOLUTION ORAL at 20:25

## 2024-10-31 RX ADMIN — SODIUM CHLORIDE, PRESERVATIVE FREE 10 ML: 5 INJECTION INTRAVENOUS at 20:20

## 2024-10-31 RX ADMIN — PHENYLEPHRINE HYDROCHLORIDE 30 MCG/MIN: 10 INJECTION INTRAVENOUS at 11:15

## 2024-10-31 RX ADMIN — AMIODARONE HYDROCHLORIDE 0.5 MG/MIN: 1.8 INJECTION, SOLUTION INTRAVENOUS at 08:01

## 2024-10-31 RX ADMIN — HEPARIN SODIUM 1600 UNITS: 1000 INJECTION INTRAVENOUS; SUBCUTANEOUS at 13:46

## 2024-10-31 RX ADMIN — SODIUM CHLORIDE: 9 INJECTION, SOLUTION INTRAVENOUS at 09:14

## 2024-10-31 RX ADMIN — BUMETANIDE 2 MG: 0.25 INJECTION INTRAMUSCULAR; INTRAVENOUS at 17:43

## 2024-10-31 RX ADMIN — INSULIN LISPRO 4 UNITS: 100 INJECTION, SOLUTION INTRAVENOUS; SUBCUTANEOUS at 17:43

## 2024-10-31 RX ADMIN — SODIUM CHLORIDE, PRESERVATIVE FREE 10 ML: 5 INJECTION INTRAVENOUS at 08:37

## 2024-10-31 RX ADMIN — LACTULOSE 10 G: 20 SOLUTION ORAL at 14:59

## 2024-10-31 RX ADMIN — INSULIN LISPRO 12 UNITS: 100 INJECTION, SOLUTION INTRAVENOUS; SUBCUTANEOUS at 05:09

## 2024-10-31 RX ADMIN — HYDROCORTISONE SODIUM SUCCINATE 100 MG: 100 INJECTION, POWDER, FOR SOLUTION INTRAMUSCULAR; INTRAVENOUS at 14:59

## 2024-10-31 RX ADMIN — SODIUM CHLORIDE, PRESERVATIVE FREE 10 ML: 5 INJECTION INTRAVENOUS at 20:23

## 2024-10-31 RX ADMIN — MIDODRINE HYDROCHLORIDE 10 MG: 5 TABLET ORAL at 08:37

## 2024-10-31 RX ADMIN — SODIUM CHLORIDE, PRESERVATIVE FREE 10 ML: 5 INJECTION INTRAVENOUS at 20:25

## 2024-10-31 RX ADMIN — Medication 2000 MG: at 11:38

## 2024-10-31 RX ADMIN — PHYTONADIONE 10 MG: 5 TABLET ORAL at 08:38

## 2024-10-31 RX ADMIN — MIDODRINE HYDROCHLORIDE 10 MG: 5 TABLET ORAL at 17:43

## 2024-10-31 RX ADMIN — LEVOTHYROXINE SODIUM 12.5 MCG: 25 TABLET ORAL at 05:10

## 2024-10-31 RX ADMIN — MIDODRINE HYDROCHLORIDE 10 MG: 5 TABLET ORAL at 11:21

## 2024-10-31 RX ADMIN — SODIUM CHLORIDE, PRESERVATIVE FREE 40 MG: 5 INJECTION INTRAVENOUS at 08:36

## 2024-10-31 RX ADMIN — HEPARIN SODIUM 1500 UNITS: 1000 INJECTION INTRAVENOUS; SUBCUTANEOUS at 13:46

## 2024-10-31 RX ADMIN — BUMETANIDE 2 MG: 0.25 INJECTION INTRAMUSCULAR; INTRAVENOUS at 09:10

## 2024-10-31 RX ADMIN — SODIUM CHLORIDE: 9 INJECTION, SOLUTION INTRAVENOUS at 23:02

## 2024-10-31 RX ADMIN — SODIUM BICARBONATE: 84 INJECTION, SOLUTION INTRAVENOUS at 06:23

## 2024-10-31 ASSESSMENT — PULMONARY FUNCTION TESTS
PIF_VALUE: 22
PIF_VALUE: 20
PIF_VALUE: 22
PIF_VALUE: 23
PIF_VALUE: 22
PIF_VALUE: 11

## 2024-11-01 ENCOUNTER — APPOINTMENT (OUTPATIENT)
Dept: CT IMAGING | Age: 79
DRG: 870 | End: 2024-11-01
Payer: MEDICARE

## 2024-11-01 LAB
AMMONIA PLAS-SCNC: 28 UMOL/L (ref 11–51)
ANION GAP SERPL CALCULATED.3IONS-SCNC: 12 MMOL/L (ref 9–16)
BASOPHILS # BLD: 0.04 K/UL (ref 0–0.2)
BASOPHILS NFR BLD: 0 % (ref 0–2)
BUN SERPL-MCNC: 56 MG/DL (ref 8–23)
CALCIUM SERPL-MCNC: 7.9 MG/DL (ref 8.6–10.4)
CHLORIDE SERPL-SCNC: 104 MMOL/L (ref 98–107)
CO2 SERPL-SCNC: 25 MMOL/L (ref 20–31)
CREAT SERPL-MCNC: 1.5 MG/DL (ref 0.6–0.9)
EOSINOPHIL # BLD: <0.03 K/UL (ref 0–0.44)
EOSINOPHILS RELATIVE PERCENT: 0 % (ref 1–4)
ERYTHROCYTE [DISTWIDTH] IN BLOOD BY AUTOMATED COUNT: 14.6 % (ref 11.8–14.4)
FIO2: 40
GFR, ESTIMATED: 35 ML/MIN/1.73M2
GLUCOSE BLD-MCNC: 179 MG/DL (ref 65–105)
GLUCOSE BLD-MCNC: 183 MG/DL (ref 65–105)
GLUCOSE BLD-MCNC: 206 MG/DL (ref 65–105)
GLUCOSE BLD-MCNC: 207 MG/DL (ref 74–100)
GLUCOSE BLD-MCNC: 209 MG/DL (ref 65–105)
GLUCOSE SERPL-MCNC: 199 MG/DL (ref 74–99)
HCT VFR BLD AUTO: 29.8 % (ref 36.3–47.1)
HCT VFR BLD AUTO: 30 % (ref 36.3–47.1)
HGB BLD-MCNC: 10.3 G/DL (ref 11.9–15.1)
HGB BLD-MCNC: 10.4 G/DL (ref 11.9–15.1)
IMM GRANULOCYTES # BLD AUTO: 0.13 K/UL (ref 0–0.3)
IMM GRANULOCYTES NFR BLD: 1 %
LACTIC ACID, WHOLE BLOOD: 1.7 MMOL/L (ref 0.7–2.1)
LACTIC ACID, WHOLE BLOOD: 1.8 MMOL/L (ref 0.7–2.1)
LYMPHOCYTES NFR BLD: 1.19 K/UL (ref 1.1–3.7)
LYMPHOCYTES RELATIVE PERCENT: 9 % (ref 24–43)
MAGNESIUM SERPL-MCNC: 1.9 MG/DL (ref 1.6–2.4)
MCH RBC QN AUTO: 30.8 PG (ref 25.2–33.5)
MCHC RBC AUTO-ENTMCNC: 34.9 G/DL (ref 28.4–34.8)
MCV RBC AUTO: 88.2 FL (ref 82.6–102.9)
MONOCYTES NFR BLD: 0.45 K/UL (ref 0.1–1.2)
MONOCYTES NFR BLD: 3 % (ref 3–12)
NEUTROPHILS NFR BLD: 87 % (ref 36–65)
NEUTS SEG NFR BLD: 11.95 K/UL (ref 1.5–8.1)
NRBC BLD-RTO: 0.2 PER 100 WBC
PHOSPHATE SERPL-MCNC: 3.4 MG/DL (ref 2.5–4.5)
PLATELET # BLD AUTO: ABNORMAL K/UL (ref 138–453)
PLATELET, FLUORESCENCE: 22 K/UL (ref 138–453)
PLATELETS.RETICULATED NFR BLD AUTO: 9.6 % (ref 1.1–10.3)
POC HCO3: 28.3 MMOL/L (ref 21–28)
POC O2 SATURATION: 97.4 % (ref 94–98)
POC PCO2: 35.2 MM HG (ref 35–48)
POC PH: 7.51 (ref 7.35–7.45)
POC PO2: 85.2 MM HG (ref 83–108)
POSITIVE BASE EXCESS, ART: 5.1 MMOL/L (ref 0–3)
POTASSIUM SERPL-SCNC: 3.3 MMOL/L (ref 3.7–5.3)
RBC # BLD AUTO: 3.38 M/UL (ref 3.95–5.11)
RBC # BLD: ABNORMAL 10*6/UL
SAMPLE SITE: ABNORMAL
SODIUM SERPL-SCNC: 141 MMOL/L (ref 136–145)
WBC OTHER # BLD: 13.8 K/UL (ref 3.5–11.3)

## 2024-11-01 PROCEDURE — 94003 VENT MGMT INPAT SUBQ DAY: CPT

## 2024-11-01 PROCEDURE — 85025 COMPLETE CBC W/AUTO DIFF WBC: CPT

## 2024-11-01 PROCEDURE — 2500000003 HC RX 250 WO HCPCS: Performed by: STUDENT IN AN ORGANIZED HEALTH CARE EDUCATION/TRAINING PROGRAM

## 2024-11-01 PROCEDURE — 82803 BLOOD GASES ANY COMBINATION: CPT

## 2024-11-01 PROCEDURE — 2700000000 HC OXYGEN THERAPY PER DAY

## 2024-11-01 PROCEDURE — 85055 RETICULATED PLATELET ASSAY: CPT

## 2024-11-01 PROCEDURE — 6370000000 HC RX 637 (ALT 250 FOR IP)

## 2024-11-01 PROCEDURE — 6360000002 HC RX W HCPCS

## 2024-11-01 PROCEDURE — 2580000003 HC RX 258: Performed by: INTERNAL MEDICINE

## 2024-11-01 PROCEDURE — 83735 ASSAY OF MAGNESIUM: CPT

## 2024-11-01 PROCEDURE — 85018 HEMOGLOBIN: CPT

## 2024-11-01 PROCEDURE — 82947 ASSAY GLUCOSE BLOOD QUANT: CPT

## 2024-11-01 PROCEDURE — 80048 BASIC METABOLIC PNL TOTAL CA: CPT

## 2024-11-01 PROCEDURE — 99291 CRITICAL CARE FIRST HOUR: CPT | Performed by: INTERNAL MEDICINE

## 2024-11-01 PROCEDURE — 37799 UNLISTED PX VASCULAR SURGERY: CPT

## 2024-11-01 PROCEDURE — 6360000002 HC RX W HCPCS: Performed by: INTERNAL MEDICINE

## 2024-11-01 PROCEDURE — 85014 HEMATOCRIT: CPT

## 2024-11-01 PROCEDURE — 2000000000 HC ICU R&B

## 2024-11-01 PROCEDURE — 99233 SBSQ HOSP IP/OBS HIGH 50: CPT | Performed by: INTERNAL MEDICINE

## 2024-11-01 PROCEDURE — 99232 SBSQ HOSP IP/OBS MODERATE 35: CPT | Performed by: INTERNAL MEDICINE

## 2024-11-01 PROCEDURE — 70450 CT HEAD/BRAIN W/O DYE: CPT

## 2024-11-01 PROCEDURE — 94761 N-INVAS EAR/PLS OXIMETRY MLT: CPT

## 2024-11-01 PROCEDURE — 2580000003 HC RX 258

## 2024-11-01 PROCEDURE — 6360000002 HC RX W HCPCS: Performed by: STUDENT IN AN ORGANIZED HEALTH CARE EDUCATION/TRAINING PROGRAM

## 2024-11-01 PROCEDURE — 84100 ASSAY OF PHOSPHORUS: CPT

## 2024-11-01 PROCEDURE — 83605 ASSAY OF LACTIC ACID: CPT

## 2024-11-01 PROCEDURE — 82140 ASSAY OF AMMONIA: CPT

## 2024-11-01 RX ADMIN — HYDROCORTISONE SODIUM SUCCINATE 100 MG: 100 INJECTION, POWDER, FOR SOLUTION INTRAMUSCULAR; INTRAVENOUS at 22:27

## 2024-11-01 RX ADMIN — HYDROCORTISONE SODIUM SUCCINATE 100 MG: 100 INJECTION, POWDER, FOR SOLUTION INTRAMUSCULAR; INTRAVENOUS at 05:12

## 2024-11-01 RX ADMIN — MIDODRINE HYDROCHLORIDE 10 MG: 5 TABLET ORAL at 08:21

## 2024-11-01 RX ADMIN — LEVOTHYROXINE SODIUM 12.5 MCG: 25 TABLET ORAL at 08:22

## 2024-11-01 RX ADMIN — AMIODARONE HYDROCHLORIDE 0.5 MG/MIN: 1.8 INJECTION, SOLUTION INTRAVENOUS at 20:31

## 2024-11-01 RX ADMIN — SODIUM CHLORIDE, PRESERVATIVE FREE 10 ML: 5 INJECTION INTRAVENOUS at 19:51

## 2024-11-01 RX ADMIN — INSULIN LISPRO 4 UNITS: 100 INJECTION, SOLUTION INTRAVENOUS; SUBCUTANEOUS at 22:24

## 2024-11-01 RX ADMIN — SODIUM CHLORIDE, PRESERVATIVE FREE 40 MG: 5 INJECTION INTRAVENOUS at 22:27

## 2024-11-01 RX ADMIN — FENTANYL CITRATE 50 MCG: 50 INJECTION, SOLUTION INTRAMUSCULAR; INTRAVENOUS at 15:36

## 2024-11-01 RX ADMIN — SODIUM CHLORIDE, PRESERVATIVE FREE 40 MG: 5 INJECTION INTRAVENOUS at 08:30

## 2024-11-01 RX ADMIN — POTASSIUM BICARBONATE 20 MEQ: 782 TABLET, EFFERVESCENT ORAL at 11:07

## 2024-11-01 RX ADMIN — Medication 2000 MG: at 12:30

## 2024-11-01 RX ADMIN — SODIUM CHLORIDE: 9 INJECTION, SOLUTION INTRAVENOUS at 11:08

## 2024-11-01 RX ADMIN — SODIUM CHLORIDE, PRESERVATIVE FREE 10 ML: 5 INJECTION INTRAVENOUS at 19:50

## 2024-11-01 RX ADMIN — SODIUM CHLORIDE, PRESERVATIVE FREE 10 ML: 5 INJECTION INTRAVENOUS at 20:01

## 2024-11-01 RX ADMIN — MIDAZOLAM HYDROCHLORIDE 1 MG: 1 INJECTION, SOLUTION INTRAMUSCULAR; INTRAVENOUS at 18:15

## 2024-11-01 RX ADMIN — BUMETANIDE 2 MG: 0.25 INJECTION INTRAMUSCULAR; INTRAVENOUS at 17:39

## 2024-11-01 RX ADMIN — LACTULOSE 10 G: 20 SOLUTION ORAL at 08:22

## 2024-11-01 RX ADMIN — FENTANYL CITRATE 50 MCG: 50 INJECTION, SOLUTION INTRAMUSCULAR; INTRAVENOUS at 19:25

## 2024-11-01 RX ADMIN — INSULIN LISPRO 4 UNITS: 100 INJECTION, SOLUTION INTRAVENOUS; SUBCUTANEOUS at 17:39

## 2024-11-01 RX ADMIN — HYDROCORTISONE SODIUM SUCCINATE 100 MG: 100 INJECTION, POWDER, FOR SOLUTION INTRAMUSCULAR; INTRAVENOUS at 13:28

## 2024-11-01 RX ADMIN — AMIODARONE HYDROCHLORIDE 0.5 MG/MIN: 1.8 INJECTION, SOLUTION INTRAVENOUS at 08:37

## 2024-11-01 RX ADMIN — MIDODRINE HYDROCHLORIDE 10 MG: 5 TABLET ORAL at 16:28

## 2024-11-01 RX ADMIN — INSULIN LISPRO 4 UNITS: 100 INJECTION, SOLUTION INTRAVENOUS; SUBCUTANEOUS at 04:17

## 2024-11-01 RX ADMIN — INSULIN GLARGINE 20 UNITS: 100 INJECTION, SOLUTION SUBCUTANEOUS at 08:22

## 2024-11-01 RX ADMIN — LACTULOSE 10 G: 20 SOLUTION ORAL at 13:27

## 2024-11-01 RX ADMIN — SODIUM CHLORIDE, PRESERVATIVE FREE 10 ML: 5 INJECTION INTRAVENOUS at 20:00

## 2024-11-01 RX ADMIN — INSULIN LISPRO 4 UNITS: 100 INJECTION, SOLUTION INTRAVENOUS; SUBCUTANEOUS at 13:00

## 2024-11-01 RX ADMIN — Medication 75 MCG/HR: at 03:16

## 2024-11-01 RX ADMIN — LACTULOSE 10 G: 20 SOLUTION ORAL at 19:59

## 2024-11-01 RX ADMIN — SODIUM CHLORIDE, PRESERVATIVE FREE 10 ML: 5 INJECTION INTRAVENOUS at 08:21

## 2024-11-01 RX ADMIN — BUMETANIDE 2 MG: 0.25 INJECTION INTRAMUSCULAR; INTRAVENOUS at 08:21

## 2024-11-01 ASSESSMENT — PULMONARY FUNCTION TESTS
PIF_VALUE: 11
PIF_VALUE: 19
PIF_VALUE: 16
PIF_VALUE: 22
PIF_VALUE: 20
PIF_VALUE: 11
PIF_VALUE: 21

## 2024-11-02 LAB
ALLEN TEST: ABNORMAL
ANION GAP SERPL CALCULATED.3IONS-SCNC: 13 MMOL/L (ref 9–16)
BASOPHILS # BLD: <0.03 K/UL (ref 0–0.2)
BASOPHILS NFR BLD: 0 % (ref 0–2)
BUN SERPL-MCNC: 53 MG/DL (ref 8–23)
CALCIUM SERPL-MCNC: 7.7 MG/DL (ref 8.6–10.4)
CHLORIDE SERPL-SCNC: 106 MMOL/L (ref 98–107)
CO2 SERPL-SCNC: 26 MMOL/L (ref 20–31)
CREAT SERPL-MCNC: 1.5 MG/DL (ref 0.6–0.9)
EOSINOPHIL # BLD: <0.03 K/UL (ref 0–0.44)
EOSINOPHILS RELATIVE PERCENT: 0 % (ref 1–4)
ERYTHROCYTE [DISTWIDTH] IN BLOOD BY AUTOMATED COUNT: 14.6 % (ref 11.8–14.4)
FIO2: 30
GFR, ESTIMATED: 35 ML/MIN/1.73M2
GLUCOSE BLD-MCNC: 224 MG/DL (ref 65–105)
GLUCOSE BLD-MCNC: 231 MG/DL (ref 74–100)
GLUCOSE BLD-MCNC: 236 MG/DL (ref 65–105)
GLUCOSE BLD-MCNC: 239 MG/DL (ref 65–105)
GLUCOSE BLD-MCNC: 265 MG/DL (ref 65–105)
GLUCOSE SERPL-MCNC: 231 MG/DL (ref 74–99)
HCT VFR BLD AUTO: 31.7 % (ref 36.3–47.1)
HGB BLD-MCNC: 10.8 G/DL (ref 11.9–15.1)
IMM GRANULOCYTES # BLD AUTO: 0.09 K/UL (ref 0–0.3)
IMM GRANULOCYTES NFR BLD: 1 %
LYMPHOCYTES NFR BLD: 0.9 K/UL (ref 1.1–3.7)
LYMPHOCYTES RELATIVE PERCENT: 8 % (ref 24–43)
MAGNESIUM SERPL-MCNC: 1.9 MG/DL (ref 1.6–2.4)
MAGNESIUM SERPL-MCNC: 1.9 MG/DL (ref 1.6–2.4)
MCH RBC QN AUTO: 30.1 PG (ref 25.2–33.5)
MCHC RBC AUTO-ENTMCNC: 34.1 G/DL (ref 28.4–34.8)
MCV RBC AUTO: 88.3 FL (ref 82.6–102.9)
MODE: ABNORMAL
MONOCYTES NFR BLD: 0.49 K/UL (ref 0.1–1.2)
MONOCYTES NFR BLD: 4 % (ref 3–12)
NEUTROPHILS NFR BLD: 87 % (ref 36–65)
NEUTS SEG NFR BLD: 10.32 K/UL (ref 1.5–8.1)
NRBC BLD-RTO: 0 PER 100 WBC
O2 DELIVERY DEVICE: ABNORMAL
PLATELET # BLD AUTO: ABNORMAL K/UL (ref 138–453)
PLATELET, FLUORESCENCE: 26 K/UL (ref 138–453)
PLATELETS.RETICULATED NFR BLD AUTO: 12.2 % (ref 1.1–10.3)
POC HCO3: 32.1 MMOL/L (ref 21–28)
POC O2 SATURATION: 96.7 % (ref 94–98)
POC PCO2: 35.5 MM HG (ref 35–48)
POC PH: 7.56 (ref 7.35–7.45)
POC PO2: 74.5 MM HG (ref 83–108)
POSITIVE BASE EXCESS, ART: 9.4 MMOL/L (ref 0–3)
POTASSIUM SERPL-SCNC: 2.6 MMOL/L (ref 3.7–5.3)
POTASSIUM SERPL-SCNC: 3.5 MMOL/L (ref 3.7–5.3)
RBC # BLD AUTO: 3.59 M/UL (ref 3.95–5.11)
RBC # BLD: ABNORMAL 10*6/UL
SAMPLE SITE: ABNORMAL
SODIUM SERPL-SCNC: 145 MMOL/L (ref 136–145)
WBC OTHER # BLD: 11.8 K/UL (ref 3.5–11.3)

## 2024-11-02 PROCEDURE — 94761 N-INVAS EAR/PLS OXIMETRY MLT: CPT

## 2024-11-02 PROCEDURE — 6370000000 HC RX 637 (ALT 250 FOR IP)

## 2024-11-02 PROCEDURE — 99233 SBSQ HOSP IP/OBS HIGH 50: CPT | Performed by: INTERNAL MEDICINE

## 2024-11-02 PROCEDURE — 2580000003 HC RX 258

## 2024-11-02 PROCEDURE — 37799 UNLISTED PX VASCULAR SURGERY: CPT

## 2024-11-02 PROCEDURE — 84132 ASSAY OF SERUM POTASSIUM: CPT

## 2024-11-02 PROCEDURE — 6370000000 HC RX 637 (ALT 250 FOR IP): Performed by: INTERNAL MEDICINE

## 2024-11-02 PROCEDURE — 82803 BLOOD GASES ANY COMBINATION: CPT

## 2024-11-02 PROCEDURE — 82947 ASSAY GLUCOSE BLOOD QUANT: CPT

## 2024-11-02 PROCEDURE — 83735 ASSAY OF MAGNESIUM: CPT

## 2024-11-02 PROCEDURE — 36415 COLL VENOUS BLD VENIPUNCTURE: CPT

## 2024-11-02 PROCEDURE — 80048 BASIC METABOLIC PNL TOTAL CA: CPT

## 2024-11-02 PROCEDURE — 99233 SBSQ HOSP IP/OBS HIGH 50: CPT | Performed by: STUDENT IN AN ORGANIZED HEALTH CARE EDUCATION/TRAINING PROGRAM

## 2024-11-02 PROCEDURE — 2000000000 HC ICU R&B

## 2024-11-02 PROCEDURE — 2500000003 HC RX 250 WO HCPCS

## 2024-11-02 PROCEDURE — 6360000002 HC RX W HCPCS

## 2024-11-02 PROCEDURE — 6360000002 HC RX W HCPCS: Performed by: INTERNAL MEDICINE

## 2024-11-02 PROCEDURE — 99291 CRITICAL CARE FIRST HOUR: CPT | Performed by: INTERNAL MEDICINE

## 2024-11-02 PROCEDURE — 99232 SBSQ HOSP IP/OBS MODERATE 35: CPT | Performed by: INTERNAL MEDICINE

## 2024-11-02 PROCEDURE — 85055 RETICULATED PLATELET ASSAY: CPT

## 2024-11-02 PROCEDURE — 2580000003 HC RX 258: Performed by: INTERNAL MEDICINE

## 2024-11-02 PROCEDURE — 2700000000 HC OXYGEN THERAPY PER DAY

## 2024-11-02 PROCEDURE — 85025 COMPLETE CBC W/AUTO DIFF WBC: CPT

## 2024-11-02 PROCEDURE — 94003 VENT MGMT INPAT SUBQ DAY: CPT

## 2024-11-02 RX ORDER — POTASSIUM CHLORIDE 29.8 MG/ML
20 INJECTION INTRAVENOUS
Status: COMPLETED | OUTPATIENT
Start: 2024-11-02 | End: 2024-11-02

## 2024-11-02 RX ORDER — POTASSIUM CHLORIDE 7.45 MG/ML
10 INJECTION INTRAVENOUS PRN
Status: DISCONTINUED | OUTPATIENT
Start: 2024-11-02 | End: 2024-11-02

## 2024-11-02 RX ORDER — GLIMEPIRIDE 2 MG/1
2 TABLET ORAL 2 TIMES DAILY
Status: ON HOLD | COMMUNITY
End: 2024-11-12 | Stop reason: HOSPADM

## 2024-11-02 RX ORDER — SIMETHICONE 80 MG
80 TABLET,CHEWABLE ORAL EVERY 6 HOURS PRN
COMMUNITY

## 2024-11-02 RX ORDER — SPIRONOLACTONE 25 MG/1
25 TABLET ORAL DAILY
Status: DISCONTINUED | OUTPATIENT
Start: 2024-11-02 | End: 2024-11-05

## 2024-11-02 RX ORDER — POTASSIUM CHLORIDE 7.45 MG/ML
10 INJECTION INTRAVENOUS PRN
Status: DISCONTINUED | OUTPATIENT
Start: 2024-11-02 | End: 2024-11-13 | Stop reason: HOSPADM

## 2024-11-02 RX ORDER — LATANOPROST 50 UG/ML
1 SOLUTION/ DROPS OPHTHALMIC NIGHTLY
COMMUNITY

## 2024-11-02 RX ORDER — POTASSIUM CHLORIDE 1500 MG/1
40 TABLET, EXTENDED RELEASE ORAL PRN
Status: DISCONTINUED | OUTPATIENT
Start: 2024-11-02 | End: 2024-11-13 | Stop reason: HOSPADM

## 2024-11-02 RX ORDER — INSULIN GLARGINE 100 [IU]/ML
25 INJECTION, SOLUTION SUBCUTANEOUS DAILY
Status: DISCONTINUED | OUTPATIENT
Start: 2024-11-02 | End: 2024-11-03

## 2024-11-02 RX ORDER — LOPERAMIDE HYDROCHLORIDE 2 MG/1
2 CAPSULE ORAL PRN
COMMUNITY

## 2024-11-02 RX ORDER — SENNOSIDES A AND B 8.6 MG/1
1 TABLET, FILM COATED ORAL DAILY
COMMUNITY

## 2024-11-02 RX ORDER — BISOPROLOL FUMARATE AND HYDROCHLOROTHIAZIDE 5; 6.25 MG/1; MG/1
1 TABLET ORAL DAILY
Status: ON HOLD | COMMUNITY
End: 2024-11-12 | Stop reason: HOSPADM

## 2024-11-02 RX ORDER — POTASSIUM CHLORIDE 1500 MG/1
40 TABLET, EXTENDED RELEASE ORAL PRN
Status: DISCONTINUED | OUTPATIENT
Start: 2024-11-02 | End: 2024-11-02

## 2024-11-02 RX ORDER — DEXMEDETOMIDINE HYDROCHLORIDE 4 UG/ML
.1-1.5 INJECTION, SOLUTION INTRAVENOUS CONTINUOUS
Status: DISCONTINUED | OUTPATIENT
Start: 2024-11-02 | End: 2024-11-05

## 2024-11-02 RX ORDER — POTASSIUM CHLORIDE 7.45 MG/ML
10 INJECTION INTRAVENOUS
Status: DISCONTINUED | OUTPATIENT
Start: 2024-11-02 | End: 2024-11-02

## 2024-11-02 RX ORDER — ACETAMINOPHEN 325 MG/1
650 TABLET ORAL EVERY 6 HOURS PRN
COMMUNITY

## 2024-11-02 RX ADMIN — INSULIN LISPRO 4 UNITS: 100 INJECTION, SOLUTION INTRAVENOUS; SUBCUTANEOUS at 16:48

## 2024-11-02 RX ADMIN — HYDROCORTISONE SODIUM SUCCINATE 100 MG: 100 INJECTION, POWDER, FOR SOLUTION INTRAMUSCULAR; INTRAVENOUS at 06:36

## 2024-11-02 RX ADMIN — SPIRONOLACTONE 25 MG: 25 TABLET, FILM COATED ORAL at 12:17

## 2024-11-02 RX ADMIN — POTASSIUM CHLORIDE 20 MEQ: 29.8 INJECTION, SOLUTION INTRAVENOUS at 09:08

## 2024-11-02 RX ADMIN — LACTULOSE 10 G: 20 SOLUTION ORAL at 14:02

## 2024-11-02 RX ADMIN — POTASSIUM CHLORIDE 20 MEQ: 29.8 INJECTION, SOLUTION INTRAVENOUS at 12:19

## 2024-11-02 RX ADMIN — SODIUM CHLORIDE: 9 INJECTION, SOLUTION INTRAVENOUS at 12:59

## 2024-11-02 RX ADMIN — LEVOTHYROXINE SODIUM 12.5 MCG: 25 TABLET ORAL at 08:21

## 2024-11-02 RX ADMIN — SODIUM CHLORIDE: 9 INJECTION, SOLUTION INTRAVENOUS at 00:57

## 2024-11-02 RX ADMIN — POTASSIUM CHLORIDE 20 MEQ: 29.8 INJECTION, SOLUTION INTRAVENOUS at 08:01

## 2024-11-02 RX ADMIN — INSULIN LISPRO 8 UNITS: 100 INJECTION, SOLUTION INTRAVENOUS; SUBCUTANEOUS at 22:40

## 2024-11-02 RX ADMIN — Medication 2000 MG: at 12:17

## 2024-11-02 RX ADMIN — SODIUM CHLORIDE, PRESERVATIVE FREE 10 ML: 5 INJECTION INTRAVENOUS at 19:37

## 2024-11-02 RX ADMIN — DEXMEDETOMIDINE HYDROCHLORIDE 0.2 MCG/KG/HR: 400 INJECTION, SOLUTION INTRAVENOUS at 08:07

## 2024-11-02 RX ADMIN — INSULIN LISPRO 4 UNITS: 100 INJECTION, SOLUTION INTRAVENOUS; SUBCUTANEOUS at 04:18

## 2024-11-02 RX ADMIN — LACTULOSE 10 G: 20 SOLUTION ORAL at 08:21

## 2024-11-02 RX ADMIN — INSULIN GLARGINE 25 UNITS: 100 INJECTION, SOLUTION SUBCUTANEOUS at 09:08

## 2024-11-02 RX ADMIN — SODIUM CHLORIDE: 9 INJECTION, SOLUTION INTRAVENOUS at 07:59

## 2024-11-02 RX ADMIN — SODIUM CHLORIDE, PRESERVATIVE FREE 40 MG: 5 INJECTION INTRAVENOUS at 10:30

## 2024-11-02 RX ADMIN — MIDODRINE HYDROCHLORIDE 10 MG: 5 TABLET ORAL at 12:17

## 2024-11-02 RX ADMIN — SODIUM CHLORIDE, PRESERVATIVE FREE 10 ML: 5 INJECTION INTRAVENOUS at 08:19

## 2024-11-02 RX ADMIN — INSULIN LISPRO 4 UNITS: 100 INJECTION, SOLUTION INTRAVENOUS; SUBCUTANEOUS at 12:16

## 2024-11-02 RX ADMIN — FENTANYL CITRATE 50 MCG: 50 INJECTION, SOLUTION INTRAMUSCULAR; INTRAVENOUS at 07:16

## 2024-11-02 RX ADMIN — POTASSIUM CHLORIDE 20 MEQ: 29.8 INJECTION, SOLUTION INTRAVENOUS at 14:02

## 2024-11-02 RX ADMIN — HYDROCORTISONE SODIUM SUCCINATE 100 MG: 100 INJECTION, POWDER, FOR SOLUTION INTRAMUSCULAR; INTRAVENOUS at 14:03

## 2024-11-02 RX ADMIN — POTASSIUM BICARBONATE 40 MEQ: 782 TABLET, EFFERVESCENT ORAL at 08:28

## 2024-11-02 RX ADMIN — POTASSIUM BICARBONATE 40 MEQ: 782 TABLET, EFFERVESCENT ORAL at 18:25

## 2024-11-02 RX ADMIN — SODIUM CHLORIDE, PRESERVATIVE FREE 40 MG: 5 INJECTION INTRAVENOUS at 22:40

## 2024-11-02 RX ADMIN — HYDROCORTISONE SODIUM SUCCINATE 100 MG: 100 INJECTION, POWDER, FOR SOLUTION INTRAMUSCULAR; INTRAVENOUS at 22:40

## 2024-11-02 RX ADMIN — MIDODRINE HYDROCHLORIDE 10 MG: 5 TABLET ORAL at 16:30

## 2024-11-02 RX ADMIN — LACTULOSE 10 G: 20 SOLUTION ORAL at 19:37

## 2024-11-02 ASSESSMENT — PULMONARY FUNCTION TESTS
PIF_VALUE: 18
PIF_VALUE: 11
PIF_VALUE: 16
PIF_VALUE: 15
PIF_VALUE: 20
PIF_VALUE: 11
PIF_VALUE: 11

## 2024-11-03 LAB
ANA SER QL IA: NEGATIVE
ANION GAP SERPL CALCULATED.3IONS-SCNC: 9 MMOL/L (ref 9–16)
BASOPHILS # BLD: 0 K/UL (ref 0–0.2)
BASOPHILS NFR BLD: 0 % (ref 0–2)
BUN SERPL-MCNC: 63 MG/DL (ref 8–23)
CALCIUM SERPL-MCNC: 7.6 MG/DL (ref 8.6–10.4)
CHLORIDE SERPL-SCNC: 112 MMOL/L (ref 98–107)
CO2 SERPL-SCNC: 26 MMOL/L (ref 20–31)
CREAT SERPL-MCNC: 1.5 MG/DL (ref 0.6–0.9)
DSDNA IGG SER QL IA: 0.7 IU/ML
EOSINOPHIL # BLD: 0 K/UL (ref 0–0.4)
EOSINOPHILS RELATIVE PERCENT: 0 % (ref 1–4)
ERYTHROCYTE [DISTWIDTH] IN BLOOD BY AUTOMATED COUNT: 14.8 % (ref 11.8–14.4)
FIO2: 30
FIO2: 40
GFR, ESTIMATED: 35 ML/MIN/1.73M2
GLUCOSE BLD-MCNC: 208 MG/DL (ref 65–105)
GLUCOSE BLD-MCNC: 270 MG/DL (ref 65–105)
GLUCOSE BLD-MCNC: 323 MG/DL (ref 65–105)
GLUCOSE BLD-MCNC: 344 MG/DL (ref 74–100)
GLUCOSE BLD-MCNC: 344 MG/DL (ref 74–100)
GLUCOSE SERPL-MCNC: 330 MG/DL (ref 74–99)
HCT VFR BLD AUTO: 29.4 % (ref 36.3–47.1)
HGB BLD-MCNC: 9.8 G/DL (ref 11.9–15.1)
IMM GRANULOCYTES # BLD AUTO: 0.08 K/UL (ref 0–0.3)
IMM GRANULOCYTES NFR BLD: 1 %
LYMPHOCYTES NFR BLD: 0.94 K/UL (ref 1–4.8)
LYMPHOCYTES RELATIVE PERCENT: 12 % (ref 24–44)
MCH RBC QN AUTO: 30.4 PG (ref 25.2–33.5)
MCHC RBC AUTO-ENTMCNC: 33.3 G/DL (ref 28.4–34.8)
MCV RBC AUTO: 91.3 FL (ref 82.6–102.9)
MONOCYTES NFR BLD: 0.31 K/UL (ref 0.1–0.8)
MONOCYTES NFR BLD: 4 % (ref 1–7)
MORPHOLOGY: ABNORMAL
NEUTROPHILS NFR BLD: 83 % (ref 36–66)
NEUTS SEG NFR BLD: 6.47 K/UL (ref 1.8–7.7)
NRBC BLD-RTO: 0 PER 100 WBC
NUCLEAR IGG SER IA-RTO: 0.2 U/ML
PLATELET # BLD AUTO: ABNORMAL K/UL (ref 138–453)
PLATELET, FLUORESCENCE: 35 K/UL (ref 138–453)
PLATELETS.RETICULATED NFR BLD AUTO: 10 % (ref 1.1–10.3)
POC HCO3: 29.4 MMOL/L (ref 21–28)
POC HCO3: 30.5 MMOL/L (ref 21–28)
POC O2 SATURATION: 95 % (ref 94–98)
POC O2 SATURATION: 97.3 % (ref 94–98)
POC PCO2: 36.3 MM HG (ref 35–48)
POC PCO2: 37.7 MM HG (ref 35–48)
POC PH: 7.51 (ref 7.35–7.45)
POC PH: 7.52 (ref 7.35–7.45)
POC PO2: 67.3 MM HG (ref 83–108)
POC PO2: 84.2 MM HG (ref 83–108)
POSITIVE BASE EXCESS, ART: 6.1 MMOL/L (ref 0–3)
POSITIVE BASE EXCESS, ART: 7.1 MMOL/L (ref 0–3)
POTASSIUM SERPL-SCNC: 4 MMOL/L (ref 3.7–5.3)
RBC # BLD AUTO: 3.22 M/UL (ref 3.95–5.11)
SAMPLE SITE: ABNORMAL
SAMPLE SITE: ABNORMAL
SODIUM SERPL-SCNC: 147 MMOL/L (ref 136–145)
WBC OTHER # BLD: 7.8 K/UL (ref 3.5–11.3)

## 2024-11-03 PROCEDURE — 80048 BASIC METABOLIC PNL TOTAL CA: CPT

## 2024-11-03 PROCEDURE — 6370000000 HC RX 637 (ALT 250 FOR IP): Performed by: INTERNAL MEDICINE

## 2024-11-03 PROCEDURE — 2000000000 HC ICU R&B

## 2024-11-03 PROCEDURE — 2580000003 HC RX 258: Performed by: INTERNAL MEDICINE

## 2024-11-03 PROCEDURE — 6370000000 HC RX 637 (ALT 250 FOR IP)

## 2024-11-03 PROCEDURE — 5A1D70Z PERFORMANCE OF URINARY FILTRATION, INTERMITTENT, LESS THAN 6 HOURS PER DAY: ICD-10-PCS | Performed by: INTERNAL MEDICINE

## 2024-11-03 PROCEDURE — 6360000002 HC RX W HCPCS

## 2024-11-03 PROCEDURE — 36415 COLL VENOUS BLD VENIPUNCTURE: CPT

## 2024-11-03 PROCEDURE — 99233 SBSQ HOSP IP/OBS HIGH 50: CPT | Performed by: INTERNAL MEDICINE

## 2024-11-03 PROCEDURE — 2580000003 HC RX 258

## 2024-11-03 PROCEDURE — 2700000000 HC OXYGEN THERAPY PER DAY

## 2024-11-03 PROCEDURE — 6360000002 HC RX W HCPCS: Performed by: INTERNAL MEDICINE

## 2024-11-03 PROCEDURE — 37799 UNLISTED PX VASCULAR SURGERY: CPT

## 2024-11-03 PROCEDURE — 82947 ASSAY GLUCOSE BLOOD QUANT: CPT

## 2024-11-03 PROCEDURE — 99291 CRITICAL CARE FIRST HOUR: CPT | Performed by: INTERNAL MEDICINE

## 2024-11-03 PROCEDURE — 82803 BLOOD GASES ANY COMBINATION: CPT

## 2024-11-03 PROCEDURE — 94761 N-INVAS EAR/PLS OXIMETRY MLT: CPT

## 2024-11-03 PROCEDURE — 6370000000 HC RX 637 (ALT 250 FOR IP): Performed by: STUDENT IN AN ORGANIZED HEALTH CARE EDUCATION/TRAINING PROGRAM

## 2024-11-03 PROCEDURE — 94003 VENT MGMT INPAT SUBQ DAY: CPT

## 2024-11-03 PROCEDURE — 85025 COMPLETE CBC W/AUTO DIFF WBC: CPT

## 2024-11-03 PROCEDURE — 2500000003 HC RX 250 WO HCPCS

## 2024-11-03 PROCEDURE — 99232 SBSQ HOSP IP/OBS MODERATE 35: CPT | Performed by: STUDENT IN AN ORGANIZED HEALTH CARE EDUCATION/TRAINING PROGRAM

## 2024-11-03 PROCEDURE — 99232 SBSQ HOSP IP/OBS MODERATE 35: CPT | Performed by: INTERNAL MEDICINE

## 2024-11-03 PROCEDURE — 85055 RETICULATED PLATELET ASSAY: CPT

## 2024-11-03 RX ORDER — CHLOROTHIAZIDE SODIUM 500 MG/1
250 INJECTION INTRAVENOUS ONCE
Status: COMPLETED | OUTPATIENT
Start: 2024-11-03 | End: 2024-11-03

## 2024-11-03 RX ORDER — SODIUM CHLORIDE 450 MG/100ML
INJECTION, SOLUTION INTRAVENOUS CONTINUOUS
Status: DISCONTINUED | OUTPATIENT
Start: 2024-11-03 | End: 2024-11-04

## 2024-11-03 RX ORDER — INSULIN GLARGINE 100 [IU]/ML
30 INJECTION, SOLUTION SUBCUTANEOUS DAILY
Status: DISCONTINUED | OUTPATIENT
Start: 2024-11-03 | End: 2024-11-04

## 2024-11-03 RX ORDER — METOPROLOL TARTRATE 25 MG/1
12.5 TABLET, FILM COATED ORAL 2 TIMES DAILY
Status: DISCONTINUED | OUTPATIENT
Start: 2024-11-03 | End: 2024-11-05

## 2024-11-03 RX ADMIN — HYDROCORTISONE SODIUM SUCCINATE 100 MG: 100 INJECTION, POWDER, FOR SOLUTION INTRAMUSCULAR; INTRAVENOUS at 22:19

## 2024-11-03 RX ADMIN — SPIRONOLACTONE 25 MG: 25 TABLET, FILM COATED ORAL at 08:29

## 2024-11-03 RX ADMIN — METOPROLOL TARTRATE 12.5 MG: 25 TABLET, FILM COATED ORAL at 22:19

## 2024-11-03 RX ADMIN — SODIUM CHLORIDE: 4.5 INJECTION, SOLUTION INTRAVENOUS at 08:56

## 2024-11-03 RX ADMIN — DEXMEDETOMIDINE HYDROCHLORIDE 0.4 MCG/KG/HR: 400 INJECTION, SOLUTION INTRAVENOUS at 01:23

## 2024-11-03 RX ADMIN — HYDROCORTISONE SODIUM SUCCINATE 100 MG: 100 INJECTION, POWDER, FOR SOLUTION INTRAMUSCULAR; INTRAVENOUS at 05:43

## 2024-11-03 RX ADMIN — LEVOTHYROXINE SODIUM 12.5 MCG: 25 TABLET ORAL at 08:32

## 2024-11-03 RX ADMIN — SODIUM CHLORIDE, PRESERVATIVE FREE 10 ML: 5 INJECTION INTRAVENOUS at 22:16

## 2024-11-03 RX ADMIN — LACTULOSE 10 G: 20 SOLUTION ORAL at 08:29

## 2024-11-03 RX ADMIN — SODIUM CHLORIDE: 9 INJECTION, SOLUTION INTRAVENOUS at 01:43

## 2024-11-03 RX ADMIN — INSULIN LISPRO 4 UNITS: 100 INJECTION, SOLUTION INTRAVENOUS; SUBCUTANEOUS at 18:14

## 2024-11-03 RX ADMIN — FENTANYL CITRATE 50 MCG: 50 INJECTION, SOLUTION INTRAMUSCULAR; INTRAVENOUS at 14:20

## 2024-11-03 RX ADMIN — SODIUM CHLORIDE, PRESERVATIVE FREE 10 ML: 5 INJECTION INTRAVENOUS at 22:15

## 2024-11-03 RX ADMIN — DEXMEDETOMIDINE HYDROCHLORIDE 0.2 MCG/KG/HR: 400 INJECTION, SOLUTION INTRAVENOUS at 22:18

## 2024-11-03 RX ADMIN — SODIUM CHLORIDE, PRESERVATIVE FREE 10 ML: 5 INJECTION INTRAVENOUS at 08:35

## 2024-11-03 RX ADMIN — SODIUM CHLORIDE, PRESERVATIVE FREE 40 MG: 5 INJECTION INTRAVENOUS at 22:19

## 2024-11-03 RX ADMIN — LACTULOSE 10 G: 20 SOLUTION ORAL at 22:19

## 2024-11-03 RX ADMIN — METOPROLOL TARTRATE 12.5 MG: 25 TABLET, FILM COATED ORAL at 08:28

## 2024-11-03 RX ADMIN — HYDROCORTISONE SODIUM SUCCINATE 100 MG: 100 INJECTION, POWDER, FOR SOLUTION INTRAMUSCULAR; INTRAVENOUS at 14:15

## 2024-11-03 RX ADMIN — Medication 2000 MG: at 11:45

## 2024-11-03 RX ADMIN — INSULIN LISPRO 12 UNITS: 100 INJECTION, SOLUTION INTRAVENOUS; SUBCUTANEOUS at 05:49

## 2024-11-03 RX ADMIN — SODIUM CHLORIDE, PRESERVATIVE FREE 40 MG: 5 INJECTION INTRAVENOUS at 08:28

## 2024-11-03 RX ADMIN — MIDODRINE HYDROCHLORIDE 10 MG: 5 TABLET ORAL at 08:29

## 2024-11-03 RX ADMIN — INSULIN GLARGINE 30 UNITS: 100 INJECTION, SOLUTION SUBCUTANEOUS at 08:28

## 2024-11-03 RX ADMIN — INSULIN LISPRO 8 UNITS: 100 INJECTION, SOLUTION INTRAVENOUS; SUBCUTANEOUS at 10:35

## 2024-11-03 RX ADMIN — CHLOROTHIAZIDE SODIUM 250 MG: 500 INJECTION, POWDER, LYOPHILIZED, FOR SOLUTION INTRAVENOUS at 09:21

## 2024-11-03 ASSESSMENT — PULMONARY FUNCTION TESTS
PIF_VALUE: 17
PIF_VALUE: 11
PIF_VALUE: 12
PIF_VALUE: 14
PIF_VALUE: 15

## 2024-11-04 ENCOUNTER — APPOINTMENT (OUTPATIENT)
Dept: GENERAL RADIOLOGY | Age: 79
DRG: 870 | End: 2024-11-04
Payer: MEDICARE

## 2024-11-04 LAB
ALLEN TEST: ABNORMAL
ANION GAP SERPL CALCULATED.3IONS-SCNC: 9 MMOL/L (ref 9–16)
BASOPHILS # BLD: 0 K/UL (ref 0–0.2)
BASOPHILS NFR BLD: 0 % (ref 0–2)
BUN SERPL-MCNC: 60 MG/DL (ref 8–23)
CALCIUM SERPL-MCNC: 7.7 MG/DL (ref 8.6–10.4)
CHLORIDE SERPL-SCNC: 112 MMOL/L (ref 98–107)
CO2 SERPL-SCNC: 28 MMOL/L (ref 20–31)
CREAT SERPL-MCNC: 1.3 MG/DL (ref 0.6–0.9)
EOSINOPHIL # BLD: 0 K/UL (ref 0–0.44)
EOSINOPHILS RELATIVE PERCENT: 0 % (ref 1–4)
ERYTHROCYTE [DISTWIDTH] IN BLOOD BY AUTOMATED COUNT: 15 % (ref 11.8–14.4)
FIO2: 40
FIO2: 40
GFR, ESTIMATED: 42 ML/MIN/1.73M2
GLUCOSE BLD-MCNC: 183 MG/DL (ref 65–105)
GLUCOSE BLD-MCNC: 200 MG/DL (ref 65–105)
GLUCOSE BLD-MCNC: 203 MG/DL (ref 65–105)
GLUCOSE BLD-MCNC: 234 MG/DL (ref 65–105)
GLUCOSE BLD-MCNC: 236 MG/DL (ref 65–105)
GLUCOSE BLD-MCNC: 264 MG/DL (ref 74–100)
GLUCOSE SERPL-MCNC: 248 MG/DL (ref 74–99)
HCT VFR BLD AUTO: 29.6 % (ref 36.3–47.1)
HGB BLD-MCNC: 9.7 G/DL (ref 11.9–15.1)
IMM GRANULOCYTES # BLD AUTO: 0.07 K/UL (ref 0–0.3)
IMM GRANULOCYTES NFR BLD: 1 %
LYMPHOCYTES NFR BLD: 0.51 K/UL (ref 1.1–3.7)
LYMPHOCYTES RELATIVE PERCENT: 7 % (ref 24–43)
MAGNESIUM SERPL-MCNC: 2.4 MG/DL (ref 1.6–2.4)
MCH RBC QN AUTO: 30.6 PG (ref 25.2–33.5)
MCHC RBC AUTO-ENTMCNC: 32.8 G/DL (ref 28.4–34.8)
MCV RBC AUTO: 93.4 FL (ref 82.6–102.9)
MONOCYTES NFR BLD: 0.29 K/UL (ref 0.1–1.2)
MONOCYTES NFR BLD: 4 % (ref 3–12)
MORPHOLOGY: ABNORMAL
NEUTROPHILS NFR BLD: 88 % (ref 36–65)
NEUTS SEG NFR BLD: 6.43 K/UL (ref 1.5–8.1)
NRBC BLD-RTO: 0 PER 100 WBC
O2 DELIVERY DEVICE: ABNORMAL
PLATELET # BLD AUTO: ABNORMAL K/UL (ref 138–453)
PLATELET, FLUORESCENCE: 57 K/UL (ref 138–453)
PLATELETS.RETICULATED NFR BLD AUTO: 8.8 % (ref 1.1–10.3)
POC HCO3: 31.9 MMOL/L (ref 21–28)
POC HCO3: 32.6 MMOL/L (ref 21–28)
POC O2 SATURATION: 97.7 % (ref 94–98)
POC O2 SATURATION: 98 % (ref 94–98)
POC PCO2: 38.9 MM HG (ref 35–48)
POC PCO2: 40.4 MM HG (ref 35–48)
POC PH: 7.51 (ref 7.35–7.45)
POC PH: 7.53 (ref 7.35–7.45)
POC PO2: 87.6 MM HG (ref 83–108)
POC PO2: 95.3 MM HG (ref 83–108)
POSITIVE BASE EXCESS, ART: 8.2 MMOL/L (ref 0–3)
POSITIVE BASE EXCESS, ART: 9.2 MMOL/L (ref 0–3)
POTASSIUM SERPL-SCNC: 3.4 MMOL/L (ref 3.7–5.3)
RBC # BLD AUTO: 3.17 M/UL (ref 3.95–5.11)
SAMPLE SITE: ABNORMAL
SAMPLE SITE: ABNORMAL
SODIUM SERPL-SCNC: 149 MMOL/L (ref 136–145)
WBC OTHER # BLD: 7.3 K/UL (ref 3.5–11.3)

## 2024-11-04 PROCEDURE — 99291 CRITICAL CARE FIRST HOUR: CPT | Performed by: INTERNAL MEDICINE

## 2024-11-04 PROCEDURE — 82803 BLOOD GASES ANY COMBINATION: CPT

## 2024-11-04 PROCEDURE — 74018 RADEX ABDOMEN 1 VIEW: CPT

## 2024-11-04 PROCEDURE — 6370000000 HC RX 637 (ALT 250 FOR IP)

## 2024-11-04 PROCEDURE — 2580000003 HC RX 258: Performed by: INTERNAL MEDICINE

## 2024-11-04 PROCEDURE — 85055 RETICULATED PLATELET ASSAY: CPT

## 2024-11-04 PROCEDURE — 82947 ASSAY GLUCOSE BLOOD QUANT: CPT

## 2024-11-04 PROCEDURE — P9047 ALBUMIN (HUMAN), 25%, 50ML: HCPCS | Performed by: INTERNAL MEDICINE

## 2024-11-04 PROCEDURE — 6360000002 HC RX W HCPCS

## 2024-11-04 PROCEDURE — 2700000000 HC OXYGEN THERAPY PER DAY

## 2024-11-04 PROCEDURE — 2580000003 HC RX 258

## 2024-11-04 PROCEDURE — 6370000000 HC RX 637 (ALT 250 FOR IP): Performed by: STUDENT IN AN ORGANIZED HEALTH CARE EDUCATION/TRAINING PROGRAM

## 2024-11-04 PROCEDURE — 80048 BASIC METABOLIC PNL TOTAL CA: CPT

## 2024-11-04 PROCEDURE — 99233 SBSQ HOSP IP/OBS HIGH 50: CPT | Performed by: INTERNAL MEDICINE

## 2024-11-04 PROCEDURE — 6370000000 HC RX 637 (ALT 250 FOR IP): Performed by: INTERNAL MEDICINE

## 2024-11-04 PROCEDURE — 6360000002 HC RX W HCPCS: Performed by: INTERNAL MEDICINE

## 2024-11-04 PROCEDURE — 94761 N-INVAS EAR/PLS OXIMETRY MLT: CPT

## 2024-11-04 PROCEDURE — 37799 UNLISTED PX VASCULAR SURGERY: CPT

## 2024-11-04 PROCEDURE — 99232 SBSQ HOSP IP/OBS MODERATE 35: CPT | Performed by: INTERNAL MEDICINE

## 2024-11-04 PROCEDURE — 85025 COMPLETE CBC W/AUTO DIFF WBC: CPT

## 2024-11-04 PROCEDURE — 99232 SBSQ HOSP IP/OBS MODERATE 35: CPT | Performed by: STUDENT IN AN ORGANIZED HEALTH CARE EDUCATION/TRAINING PROGRAM

## 2024-11-04 PROCEDURE — 94003 VENT MGMT INPAT SUBQ DAY: CPT

## 2024-11-04 PROCEDURE — 2000000000 HC ICU R&B

## 2024-11-04 PROCEDURE — 83735 ASSAY OF MAGNESIUM: CPT

## 2024-11-04 RX ORDER — ALBUMIN (HUMAN) 12.5 G/50ML
25 SOLUTION INTRAVENOUS ONCE
Status: COMPLETED | OUTPATIENT
Start: 2024-11-04 | End: 2024-11-04

## 2024-11-04 RX ORDER — CHLOROTHIAZIDE SODIUM 500 MG/1
500 INJECTION INTRAVENOUS 2 TIMES DAILY
Status: COMPLETED | OUTPATIENT
Start: 2024-11-04 | End: 2024-11-04

## 2024-11-04 RX ORDER — INSULIN GLARGINE 100 [IU]/ML
10 INJECTION, SOLUTION SUBCUTANEOUS DAILY
Status: DISCONTINUED | OUTPATIENT
Start: 2024-11-05 | End: 2024-11-06

## 2024-11-04 RX ORDER — DEXTROSE MONOHYDRATE AND SODIUM CHLORIDE 5; .45 G/100ML; G/100ML
INJECTION, SOLUTION INTRAVENOUS CONTINUOUS
Status: DISCONTINUED | OUTPATIENT
Start: 2024-11-04 | End: 2024-11-06

## 2024-11-04 RX ORDER — FUROSEMIDE 10 MG/ML
20 INJECTION INTRAMUSCULAR; INTRAVENOUS ONCE
Status: COMPLETED | OUTPATIENT
Start: 2024-11-04 | End: 2024-11-04

## 2024-11-04 RX ORDER — LATANOPROST 50 UG/ML
1 SOLUTION/ DROPS OPHTHALMIC NIGHTLY
Status: DISCONTINUED | OUTPATIENT
Start: 2024-11-04 | End: 2024-11-13 | Stop reason: HOSPADM

## 2024-11-04 RX ADMIN — CHLOROTHIAZIDE SODIUM 500 MG: 500 INJECTION, POWDER, LYOPHILIZED, FOR SOLUTION INTRAVENOUS at 09:34

## 2024-11-04 RX ADMIN — MIDODRINE HYDROCHLORIDE 10 MG: 5 TABLET ORAL at 07:58

## 2024-11-04 RX ADMIN — HYDROCORTISONE SODIUM SUCCINATE 100 MG: 100 INJECTION, POWDER, FOR SOLUTION INTRAMUSCULAR; INTRAVENOUS at 13:30

## 2024-11-04 RX ADMIN — POTASSIUM BICARBONATE 40 MEQ: 782 TABLET, EFFERVESCENT ORAL at 06:44

## 2024-11-04 RX ADMIN — INSULIN LISPRO 8 UNITS: 100 INJECTION, SOLUTION INTRAVENOUS; SUBCUTANEOUS at 05:00

## 2024-11-04 RX ADMIN — SODIUM CHLORIDE, PRESERVATIVE FREE 40 MG: 5 INJECTION INTRAVENOUS at 08:01

## 2024-11-04 RX ADMIN — LACTULOSE 10 G: 20 SOLUTION ORAL at 07:58

## 2024-11-04 RX ADMIN — LATANOPROST 1 DROP: 50 SOLUTION OPHTHALMIC at 20:10

## 2024-11-04 RX ADMIN — HYDROCORTISONE SODIUM SUCCINATE 100 MG: 100 INJECTION, POWDER, FOR SOLUTION INTRAMUSCULAR; INTRAVENOUS at 05:01

## 2024-11-04 RX ADMIN — LEVOTHYROXINE SODIUM 12.5 MCG: 25 TABLET ORAL at 07:59

## 2024-11-04 RX ADMIN — CHLOROTHIAZIDE SODIUM 500 MG: 500 INJECTION, POWDER, LYOPHILIZED, FOR SOLUTION INTRAVENOUS at 20:07

## 2024-11-04 RX ADMIN — INSULIN LISPRO 4 UNITS: 100 INJECTION, SOLUTION INTRAVENOUS; SUBCUTANEOUS at 23:55

## 2024-11-04 RX ADMIN — FUROSEMIDE 20 MG: 10 INJECTION, SOLUTION INTRAMUSCULAR; INTRAVENOUS at 10:36

## 2024-11-04 RX ADMIN — SPIRONOLACTONE 25 MG: 25 TABLET, FILM COATED ORAL at 07:58

## 2024-11-04 RX ADMIN — LACTULOSE 10 G: 20 SOLUTION ORAL at 13:30

## 2024-11-04 RX ADMIN — ALBUMIN (HUMAN) 25 G: 0.25 INJECTION, SOLUTION INTRAVENOUS at 09:40

## 2024-11-04 RX ADMIN — SODIUM CHLORIDE, PRESERVATIVE FREE 40 MG: 5 INJECTION INTRAVENOUS at 20:05

## 2024-11-04 RX ADMIN — SODIUM CHLORIDE, PRESERVATIVE FREE 10 ML: 5 INJECTION INTRAVENOUS at 20:06

## 2024-11-04 RX ADMIN — DEXTROSE AND SODIUM CHLORIDE: 5; 450 INJECTION, SOLUTION INTRAVENOUS at 17:52

## 2024-11-04 RX ADMIN — HYDROCORTISONE SODIUM SUCCINATE 50 MG: 100 INJECTION, POWDER, FOR SOLUTION INTRAMUSCULAR; INTRAVENOUS at 22:05

## 2024-11-04 RX ADMIN — METOPROLOL TARTRATE 12.5 MG: 25 TABLET, FILM COATED ORAL at 07:58

## 2024-11-04 RX ADMIN — SODIUM CHLORIDE: 4.5 INJECTION, SOLUTION INTRAVENOUS at 05:15

## 2024-11-04 RX ADMIN — Medication 2000 MG: at 12:30

## 2024-11-04 RX ADMIN — SODIUM CHLORIDE, PRESERVATIVE FREE 10 ML: 5 INJECTION INTRAVENOUS at 07:58

## 2024-11-04 RX ADMIN — INSULIN LISPRO 4 UNITS: 100 INJECTION, SOLUTION INTRAVENOUS; SUBCUTANEOUS at 00:23

## 2024-11-04 RX ADMIN — LACTULOSE 10 G: 20 SOLUTION ORAL at 20:02

## 2024-11-04 RX ADMIN — INSULIN GLARGINE 30 UNITS: 100 INJECTION, SOLUTION SUBCUTANEOUS at 07:58

## 2024-11-04 RX ADMIN — METOPROLOL TARTRATE 12.5 MG: 25 TABLET, FILM COATED ORAL at 20:03

## 2024-11-04 ASSESSMENT — PULMONARY FUNCTION TESTS
PIF_VALUE: 11
PIF_VALUE: 10
PIF_VALUE: 11
PIF_VALUE: 7
PIF_VALUE: 11
PIF_VALUE: 13
PIF_VALUE: 11
PIF_VALUE: 11
PIF_VALUE: 12
PIF_VALUE: 11
PIF_VALUE: 14
PIF_VALUE: 11
PIF_VALUE: 11
PIF_VALUE: 19
PIF_VALUE: 11
PIF_VALUE: 20

## 2024-11-04 ASSESSMENT — PAIN SCALES - GENERAL: PAINLEVEL_OUTOF10: 0

## 2024-11-05 LAB
ANION GAP SERPL CALCULATED.3IONS-SCNC: 10 MMOL/L (ref 9–16)
ANION GAP SERPL CALCULATED.3IONS-SCNC: 7 MMOL/L (ref 9–16)
BASOPHILS # BLD: <0.03 K/UL (ref 0–0.2)
BASOPHILS NFR BLD: 0 % (ref 0–2)
BUN SERPL-MCNC: 55 MG/DL (ref 8–23)
CALCIUM SERPL-MCNC: 7.8 MG/DL (ref 8.6–10.4)
CHLORIDE SERPL-SCNC: 108 MMOL/L (ref 98–107)
CHLORIDE SERPL-SCNC: 109 MMOL/L (ref 98–107)
CO2 SERPL-SCNC: 30 MMOL/L (ref 20–31)
CO2 SERPL-SCNC: 32 MMOL/L (ref 20–31)
CREAT SERPL-MCNC: 1.2 MG/DL (ref 0.6–0.9)
EOSINOPHIL # BLD: <0.03 K/UL (ref 0–0.44)
EOSINOPHILS RELATIVE PERCENT: 0 % (ref 1–4)
ERYTHROCYTE [DISTWIDTH] IN BLOOD BY AUTOMATED COUNT: 15.1 % (ref 11.8–14.4)
GFR, ESTIMATED: 46 ML/MIN/1.73M2
GLUCOSE BLD-MCNC: 154 MG/DL (ref 65–105)
GLUCOSE BLD-MCNC: 171 MG/DL (ref 65–105)
GLUCOSE BLD-MCNC: 186 MG/DL (ref 65–105)
GLUCOSE BLD-MCNC: 199 MG/DL (ref 65–105)
GLUCOSE SERPL-MCNC: 178 MG/DL (ref 74–99)
HCT VFR BLD AUTO: 29.7 % (ref 36.3–47.1)
HGB BLD-MCNC: 9.6 G/DL (ref 11.9–15.1)
IMM GRANULOCYTES # BLD AUTO: 0.08 K/UL (ref 0–0.3)
IMM GRANULOCYTES NFR BLD: 1 %
LYMPHOCYTES NFR BLD: 0.78 K/UL (ref 1.1–3.7)
LYMPHOCYTES RELATIVE PERCENT: 8 % (ref 24–43)
MAGNESIUM SERPL-MCNC: 2.3 MG/DL (ref 1.6–2.4)
MCH RBC QN AUTO: 30.6 PG (ref 25.2–33.5)
MCHC RBC AUTO-ENTMCNC: 32.3 G/DL (ref 28.4–34.8)
MCV RBC AUTO: 94.6 FL (ref 82.6–102.9)
MONOCYTES NFR BLD: 0.48 K/UL (ref 0.1–1.2)
MONOCYTES NFR BLD: 5 % (ref 3–12)
NEUTROPHILS NFR BLD: 86 % (ref 36–65)
NEUTS SEG NFR BLD: 8.3 K/UL (ref 1.5–8.1)
NRBC BLD-RTO: 0 PER 100 WBC
PLATELET # BLD AUTO: 95 K/UL (ref 138–453)
PMV BLD AUTO: 12.5 FL (ref 8.1–13.5)
POTASSIUM SERPL-SCNC: 2.5 MMOL/L (ref 3.7–5.3)
POTASSIUM SERPL-SCNC: 3.3 MMOL/L (ref 3.7–5.3)
RBC # BLD AUTO: 3.14 M/UL (ref 3.95–5.11)
RBC # BLD: ABNORMAL 10*6/UL
SODIUM SERPL-SCNC: 147 MMOL/L (ref 136–145)
SODIUM SERPL-SCNC: 149 MMOL/L (ref 136–145)
WBC OTHER # BLD: 9.7 K/UL (ref 3.5–11.3)

## 2024-11-05 PROCEDURE — 6360000002 HC RX W HCPCS: Performed by: INTERNAL MEDICINE

## 2024-11-05 PROCEDURE — 97530 THERAPEUTIC ACTIVITIES: CPT

## 2024-11-05 PROCEDURE — 6360000002 HC RX W HCPCS

## 2024-11-05 PROCEDURE — 2580000003 HC RX 258

## 2024-11-05 PROCEDURE — 82947 ASSAY GLUCOSE BLOOD QUANT: CPT

## 2024-11-05 PROCEDURE — 2580000003 HC RX 258: Performed by: INTERNAL MEDICINE

## 2024-11-05 PROCEDURE — 2700000000 HC OXYGEN THERAPY PER DAY

## 2024-11-05 PROCEDURE — 80051 ELECTROLYTE PANEL: CPT

## 2024-11-05 PROCEDURE — 83735 ASSAY OF MAGNESIUM: CPT

## 2024-11-05 PROCEDURE — 99232 SBSQ HOSP IP/OBS MODERATE 35: CPT | Performed by: INTERNAL MEDICINE

## 2024-11-05 PROCEDURE — 6370000000 HC RX 637 (ALT 250 FOR IP): Performed by: INTERNAL MEDICINE

## 2024-11-05 PROCEDURE — 97163 PT EVAL HIGH COMPLEX 45 MIN: CPT

## 2024-11-05 PROCEDURE — 37799 UNLISTED PX VASCULAR SURGERY: CPT

## 2024-11-05 PROCEDURE — 6370000000 HC RX 637 (ALT 250 FOR IP)

## 2024-11-05 PROCEDURE — 97167 OT EVAL HIGH COMPLEX 60 MIN: CPT

## 2024-11-05 PROCEDURE — 97535 SELF CARE MNGMENT TRAINING: CPT

## 2024-11-05 PROCEDURE — 94761 N-INVAS EAR/PLS OXIMETRY MLT: CPT

## 2024-11-05 PROCEDURE — 80048 BASIC METABOLIC PNL TOTAL CA: CPT

## 2024-11-05 PROCEDURE — 99291 CRITICAL CARE FIRST HOUR: CPT | Performed by: INTERNAL MEDICINE

## 2024-11-05 PROCEDURE — 99233 SBSQ HOSP IP/OBS HIGH 50: CPT | Performed by: INTERNAL MEDICINE

## 2024-11-05 PROCEDURE — 2060000000 HC ICU INTERMEDIATE R&B

## 2024-11-05 PROCEDURE — 92610 EVALUATE SWALLOWING FUNCTION: CPT

## 2024-11-05 PROCEDURE — 85025 COMPLETE CBC W/AUTO DIFF WBC: CPT

## 2024-11-05 RX ORDER — SPIRONOLACTONE 25 MG/1
25 TABLET ORAL ONCE
Status: COMPLETED | OUTPATIENT
Start: 2024-11-05 | End: 2024-11-05

## 2024-11-05 RX ORDER — SPIRONOLACTONE 25 MG/1
50 TABLET ORAL DAILY
Status: DISCONTINUED | OUTPATIENT
Start: 2024-11-06 | End: 2024-11-13 | Stop reason: HOSPADM

## 2024-11-05 RX ORDER — CARVEDILOL 3.12 MG/1
3.12 TABLET ORAL 2 TIMES DAILY WITH MEALS
Status: DISCONTINUED | OUTPATIENT
Start: 2024-11-05 | End: 2024-11-13 | Stop reason: HOSPADM

## 2024-11-05 RX ORDER — CHLOROTHIAZIDE SODIUM 500 MG/1
250 INJECTION INTRAVENOUS ONCE
Status: COMPLETED | OUTPATIENT
Start: 2024-11-05 | End: 2024-11-05

## 2024-11-05 RX ORDER — PREDNISONE 10 MG/1
5 TABLET ORAL DAILY
Status: COMPLETED | OUTPATIENT
Start: 2024-11-10 | End: 2024-11-11

## 2024-11-05 RX ORDER — PREDNISONE 10 MG/1
10 TABLET ORAL DAILY
Status: COMPLETED | OUTPATIENT
Start: 2024-11-08 | End: 2024-11-09

## 2024-11-05 RX ORDER — PREDNISONE 10 MG/1
15 TABLET ORAL DAILY
Status: COMPLETED | OUTPATIENT
Start: 2024-11-06 | End: 2024-11-07

## 2024-11-05 RX ORDER — CARVEDILOL 6.25 MG/1
6.25 TABLET ORAL 2 TIMES DAILY WITH MEALS
Status: DISCONTINUED | OUTPATIENT
Start: 2024-11-05 | End: 2024-11-05

## 2024-11-05 RX ADMIN — SODIUM CHLORIDE, PRESERVATIVE FREE 40 MG: 5 INJECTION INTRAVENOUS at 08:33

## 2024-11-05 RX ADMIN — LACTULOSE 10 G: 20 SOLUTION ORAL at 20:33

## 2024-11-05 RX ADMIN — SPIRONOLACTONE 25 MG: 25 TABLET, FILM COATED ORAL at 10:10

## 2024-11-05 RX ADMIN — POTASSIUM BICARBONATE 40 MEQ: 782 TABLET, EFFERVESCENT ORAL at 07:38

## 2024-11-05 RX ADMIN — LACTULOSE 10 G: 20 SOLUTION ORAL at 08:28

## 2024-11-05 RX ADMIN — CHLOROTHIAZIDE SODIUM 250 MG: 500 INJECTION, POWDER, LYOPHILIZED, FOR SOLUTION INTRAVENOUS at 10:15

## 2024-11-05 RX ADMIN — HYDROCORTISONE SODIUM SUCCINATE 50 MG: 100 INJECTION, POWDER, FOR SOLUTION INTRAMUSCULAR; INTRAVENOUS at 06:13

## 2024-11-05 RX ADMIN — INSULIN LISPRO 4 UNITS: 100 INJECTION, SOLUTION INTRAVENOUS; SUBCUTANEOUS at 18:14

## 2024-11-05 RX ADMIN — SPIRONOLACTONE 25 MG: 25 TABLET, FILM COATED ORAL at 07:39

## 2024-11-05 RX ADMIN — CARVEDILOL 3.12 MG: 6.25 TABLET, FILM COATED ORAL at 18:14

## 2024-11-05 RX ADMIN — LEVOTHYROXINE SODIUM 12.5 MCG: 25 TABLET ORAL at 08:32

## 2024-11-05 RX ADMIN — CARVEDILOL 3.12 MG: 6.25 TABLET, FILM COATED ORAL at 10:08

## 2024-11-05 RX ADMIN — SODIUM CHLORIDE, PRESERVATIVE FREE 10 ML: 5 INJECTION INTRAVENOUS at 08:33

## 2024-11-05 RX ADMIN — DEXTROSE AND SODIUM CHLORIDE: 5; 450 INJECTION, SOLUTION INTRAVENOUS at 11:45

## 2024-11-05 RX ADMIN — LATANOPROST 1 DROP: 50 SOLUTION OPHTHALMIC at 22:15

## 2024-11-05 RX ADMIN — LACTULOSE 10 G: 20 SOLUTION ORAL at 13:04

## 2024-11-05 RX ADMIN — POTASSIUM BICARBONATE 40 MEQ: 782 TABLET, EFFERVESCENT ORAL at 15:17

## 2024-11-05 RX ADMIN — SODIUM CHLORIDE, PRESERVATIVE FREE 10 ML: 5 INJECTION INTRAVENOUS at 20:34

## 2024-11-05 RX ADMIN — Medication 2000 MG: at 13:05

## 2024-11-05 RX ADMIN — INSULIN LISPRO 4 UNITS: 100 INJECTION, SOLUTION INTRAVENOUS; SUBCUTANEOUS at 05:29

## 2024-11-05 RX ADMIN — SODIUM CHLORIDE, PRESERVATIVE FREE 40 MG: 5 INJECTION INTRAVENOUS at 20:33

## 2024-11-05 RX ADMIN — INSULIN GLARGINE 10 UNITS: 100 INJECTION, SOLUTION SUBCUTANEOUS at 08:28

## 2024-11-06 PROBLEM — R79.82 CRP ELEVATED: Status: RESOLVED | Noted: 2024-10-29 | Resolved: 2024-11-06

## 2024-11-06 PROBLEM — R65.10 SIRS (SYSTEMIC INFLAMMATORY RESPONSE SYNDROME) (HCC): Status: RESOLVED | Noted: 2024-10-29 | Resolved: 2024-11-06

## 2024-11-06 PROBLEM — R79.89 ELEVATED PROCALCITONIN: Status: RESOLVED | Noted: 2024-10-29 | Resolved: 2024-11-06

## 2024-11-06 PROBLEM — E87.20 LACTIC ACIDOSIS: Status: RESOLVED | Noted: 2024-10-28 | Resolved: 2024-11-06

## 2024-11-06 PROBLEM — A41.9 SEPTIC SHOCK (HCC): Status: RESOLVED | Noted: 2024-10-28 | Resolved: 2024-11-06

## 2024-11-06 PROBLEM — D69.6 THROMBOCYTOPENIA (HCC): Status: ACTIVE | Noted: 2024-11-06

## 2024-11-06 PROBLEM — R65.21 SEPTIC SHOCK (HCC): Status: RESOLVED | Noted: 2024-10-28 | Resolved: 2024-11-06

## 2024-11-06 PROBLEM — T68.XXXA HYPOTHERMIA: Status: RESOLVED | Noted: 2024-10-28 | Resolved: 2024-11-06

## 2024-11-06 LAB
ANION GAP SERPL CALCULATED.3IONS-SCNC: 8 MMOL/L (ref 9–16)
BASOPHILS # BLD: <0.03 K/UL (ref 0–0.2)
BASOPHILS NFR BLD: 0 % (ref 0–2)
BUN SERPL-MCNC: 45 MG/DL (ref 8–23)
CALCIUM SERPL-MCNC: 7.4 MG/DL (ref 8.6–10.4)
CHLORIDE SERPL-SCNC: 105 MMOL/L (ref 98–107)
CO2 SERPL-SCNC: 29 MMOL/L (ref 20–31)
CREAT SERPL-MCNC: 1.1 MG/DL (ref 0.6–0.9)
EOSINOPHIL # BLD: 0.07 K/UL (ref 0–0.44)
EOSINOPHILS RELATIVE PERCENT: 1 % (ref 1–4)
ERYTHROCYTE [DISTWIDTH] IN BLOOD BY AUTOMATED COUNT: 15.3 % (ref 11.8–14.4)
GFR, ESTIMATED: 51 ML/MIN/1.73M2
GLUCOSE BLD-MCNC: 124 MG/DL (ref 65–105)
GLUCOSE BLD-MCNC: 136 MG/DL (ref 65–105)
GLUCOSE BLD-MCNC: 159 MG/DL (ref 65–105)
GLUCOSE BLD-MCNC: 172 MG/DL (ref 65–105)
GLUCOSE BLD-MCNC: 292 MG/DL (ref 65–105)
GLUCOSE SERPL-MCNC: 269 MG/DL (ref 74–99)
HCT VFR BLD AUTO: 35.2 % (ref 36.3–47.1)
HGB BLD-MCNC: 10.7 G/DL (ref 11.9–15.1)
IMM GRANULOCYTES # BLD AUTO: 0.09 K/UL (ref 0–0.3)
IMM GRANULOCYTES NFR BLD: 1 %
LYMPHOCYTES NFR BLD: 1.22 K/UL (ref 1.1–3.7)
LYMPHOCYTES RELATIVE PERCENT: 10 % (ref 24–43)
MCH RBC QN AUTO: 30.7 PG (ref 25.2–33.5)
MCHC RBC AUTO-ENTMCNC: 30.4 G/DL (ref 28.4–34.8)
MCV RBC AUTO: 101.1 FL (ref 82.6–102.9)
MONOCYTES NFR BLD: 0.53 K/UL (ref 0.1–1.2)
MONOCYTES NFR BLD: 4 % (ref 3–12)
NEUTROPHILS NFR BLD: 84 % (ref 36–65)
NEUTS SEG NFR BLD: 10.37 K/UL (ref 1.5–8.1)
NRBC BLD-RTO: 0 PER 100 WBC
PLATELET # BLD AUTO: 136 K/UL (ref 138–453)
PMV BLD AUTO: 11.6 FL (ref 8.1–13.5)
POTASSIUM SERPL-SCNC: 3.8 MMOL/L (ref 3.7–5.3)
RBC # BLD AUTO: 3.48 M/UL (ref 3.95–5.11)
RBC # BLD: ABNORMAL 10*6/UL
SODIUM SERPL-SCNC: 142 MMOL/L (ref 136–145)
TSH SERPL DL<=0.05 MIU/L-ACNC: 1.25 UIU/ML (ref 0.27–4.2)
WBC OTHER # BLD: 12.3 K/UL (ref 3.5–11.3)

## 2024-11-06 PROCEDURE — 6370000000 HC RX 637 (ALT 250 FOR IP): Performed by: INTERNAL MEDICINE

## 2024-11-06 PROCEDURE — 6370000000 HC RX 637 (ALT 250 FOR IP)

## 2024-11-06 PROCEDURE — 99232 SBSQ HOSP IP/OBS MODERATE 35: CPT | Performed by: INTERNAL MEDICINE

## 2024-11-06 PROCEDURE — 99222 1ST HOSP IP/OBS MODERATE 55: CPT | Performed by: INTERNAL MEDICINE

## 2024-11-06 PROCEDURE — 85025 COMPLETE CBC W/AUTO DIFF WBC: CPT

## 2024-11-06 PROCEDURE — 97110 THERAPEUTIC EXERCISES: CPT

## 2024-11-06 PROCEDURE — 2060000000 HC ICU INTERMEDIATE R&B

## 2024-11-06 PROCEDURE — 97530 THERAPEUTIC ACTIVITIES: CPT

## 2024-11-06 PROCEDURE — 99233 SBSQ HOSP IP/OBS HIGH 50: CPT | Performed by: INTERNAL MEDICINE

## 2024-11-06 PROCEDURE — 2580000003 HC RX 258

## 2024-11-06 PROCEDURE — 99233 SBSQ HOSP IP/OBS HIGH 50: CPT | Performed by: SURGERY

## 2024-11-06 PROCEDURE — 2580000003 HC RX 258: Performed by: INTERNAL MEDICINE

## 2024-11-06 PROCEDURE — 36415 COLL VENOUS BLD VENIPUNCTURE: CPT

## 2024-11-06 PROCEDURE — 6360000002 HC RX W HCPCS

## 2024-11-06 PROCEDURE — 84443 ASSAY THYROID STIM HORMONE: CPT

## 2024-11-06 PROCEDURE — G0108 DIAB MANAGE TRN  PER INDIV: HCPCS

## 2024-11-06 PROCEDURE — 80048 BASIC METABOLIC PNL TOTAL CA: CPT

## 2024-11-06 PROCEDURE — 82947 ASSAY GLUCOSE BLOOD QUANT: CPT

## 2024-11-06 PROCEDURE — 6360000002 HC RX W HCPCS: Performed by: INTERNAL MEDICINE

## 2024-11-06 RX ORDER — INSULIN GLARGINE 100 [IU]/ML
12 INJECTION, SOLUTION SUBCUTANEOUS DAILY
Status: DISCONTINUED | OUTPATIENT
Start: 2024-11-07 | End: 2024-11-07

## 2024-11-06 RX ORDER — PANTOPRAZOLE SODIUM 40 MG/1
40 TABLET, DELAYED RELEASE ORAL
Status: DISCONTINUED | OUTPATIENT
Start: 2024-11-06 | End: 2024-11-13 | Stop reason: HOSPADM

## 2024-11-06 RX ADMIN — LEVOTHYROXINE SODIUM 12.5 MCG: 25 TABLET ORAL at 08:46

## 2024-11-06 RX ADMIN — SODIUM CHLORIDE, PRESERVATIVE FREE 10 ML: 5 INJECTION INTRAVENOUS at 08:48

## 2024-11-06 RX ADMIN — INSULIN GLARGINE 10 UNITS: 100 INJECTION, SOLUTION SUBCUTANEOUS at 08:48

## 2024-11-06 RX ADMIN — CARVEDILOL 3.12 MG: 6.25 TABLET, FILM COATED ORAL at 08:46

## 2024-11-06 RX ADMIN — LACTULOSE 10 G: 20 SOLUTION ORAL at 08:46

## 2024-11-06 RX ADMIN — INSULIN LISPRO 8 UNITS: 100 INJECTION, SOLUTION INTRAVENOUS; SUBCUTANEOUS at 11:42

## 2024-11-06 RX ADMIN — PANTOPRAZOLE SODIUM 40 MG: 40 TABLET, DELAYED RELEASE ORAL at 17:10

## 2024-11-06 RX ADMIN — CARVEDILOL 3.12 MG: 6.25 TABLET, FILM COATED ORAL at 17:10

## 2024-11-06 RX ADMIN — Medication 2000 MG: at 11:34

## 2024-11-06 RX ADMIN — LACTULOSE 10 G: 20 SOLUTION ORAL at 19:55

## 2024-11-06 RX ADMIN — SODIUM CHLORIDE, PRESERVATIVE FREE 40 MG: 5 INJECTION INTRAVENOUS at 08:47

## 2024-11-06 RX ADMIN — PREDNISONE 15 MG: 10 TABLET ORAL at 08:46

## 2024-11-06 RX ADMIN — SODIUM CHLORIDE, PRESERVATIVE FREE 10 ML: 5 INJECTION INTRAVENOUS at 19:55

## 2024-11-06 RX ADMIN — LATANOPROST 1 DROP: 50 SOLUTION OPHTHALMIC at 19:55

## 2024-11-06 RX ADMIN — SPIRONOLACTONE 50 MG: 25 TABLET, FILM COATED ORAL at 08:46

## 2024-11-06 ASSESSMENT — PAIN SCALES - GENERAL: PAINLEVEL_OUTOF10: 6

## 2024-11-06 ASSESSMENT — PAIN DESCRIPTION - LOCATION: LOCATION: GENERALIZED

## 2024-11-07 ENCOUNTER — APPOINTMENT (OUTPATIENT)
Dept: MRI IMAGING | Age: 79
DRG: 870 | End: 2024-11-07
Payer: MEDICARE

## 2024-11-07 LAB
ANION GAP SERPL CALCULATED.3IONS-SCNC: 7 MMOL/L (ref 9–16)
BASOPHILS # BLD: <0.03 K/UL (ref 0–0.2)
BASOPHILS NFR BLD: 0 % (ref 0–2)
BUN SERPL-MCNC: 39 MG/DL (ref 8–23)
CALCIUM SERPL-MCNC: 7.3 MG/DL (ref 8.6–10.4)
CHLORIDE SERPL-SCNC: 103 MMOL/L (ref 98–107)
CO2 SERPL-SCNC: 30 MMOL/L (ref 20–31)
CREAT SERPL-MCNC: 0.9 MG/DL (ref 0.6–0.9)
EOSINOPHIL # BLD: 0.12 K/UL (ref 0–0.44)
EOSINOPHILS RELATIVE PERCENT: 1 % (ref 1–4)
ERYTHROCYTE [DISTWIDTH] IN BLOOD BY AUTOMATED COUNT: 14.5 % (ref 11.8–14.4)
FOLATE SERPL-MCNC: 7.3 NG/ML (ref 4.8–24.2)
GFR, ESTIMATED: 65 ML/MIN/1.73M2
GLUCOSE BLD-MCNC: 127 MG/DL (ref 65–105)
GLUCOSE BLD-MCNC: 211 MG/DL (ref 65–105)
GLUCOSE BLD-MCNC: 213 MG/DL (ref 65–105)
GLUCOSE BLD-MCNC: 232 MG/DL (ref 65–105)
GLUCOSE SERPL-MCNC: 126 MG/DL (ref 74–99)
HCT VFR BLD AUTO: 30.5 % (ref 36.3–47.1)
HGB BLD-MCNC: 9.6 G/DL (ref 11.9–15.1)
IMM GRANULOCYTES # BLD AUTO: 0.06 K/UL (ref 0–0.3)
IMM GRANULOCYTES NFR BLD: 1 %
LYMPHOCYTES NFR BLD: 1.55 K/UL (ref 1.1–3.7)
LYMPHOCYTES RELATIVE PERCENT: 13 % (ref 24–43)
MAGNESIUM SERPL-MCNC: 2.1 MG/DL (ref 1.6–2.4)
MCH RBC QN AUTO: 30.5 PG (ref 25.2–33.5)
MCHC RBC AUTO-ENTMCNC: 31.5 G/DL (ref 28.4–34.8)
MCV RBC AUTO: 96.8 FL (ref 82.6–102.9)
MONOCYTES NFR BLD: 0.51 K/UL (ref 0.1–1.2)
MONOCYTES NFR BLD: 4 % (ref 3–12)
NEUTROPHILS NFR BLD: 81 % (ref 36–65)
NEUTS SEG NFR BLD: 10.03 K/UL (ref 1.5–8.1)
NRBC BLD-RTO: 0 PER 100 WBC
PLATELET # BLD AUTO: 181 K/UL (ref 138–453)
PMV BLD AUTO: 11.7 FL (ref 8.1–13.5)
POTASSIUM SERPL-SCNC: 3.5 MMOL/L (ref 3.7–5.3)
RBC # BLD AUTO: 3.15 M/UL (ref 3.95–5.11)
RBC # BLD: ABNORMAL 10*6/UL
SODIUM SERPL-SCNC: 140 MMOL/L (ref 136–145)
VIT B12 SERPL-MCNC: >2000 PG/ML (ref 232–1245)
WBC OTHER # BLD: 12.3 K/UL (ref 3.5–11.3)

## 2024-11-07 PROCEDURE — 97530 THERAPEUTIC ACTIVITIES: CPT

## 2024-11-07 PROCEDURE — 85025 COMPLETE CBC W/AUTO DIFF WBC: CPT

## 2024-11-07 PROCEDURE — 99232 SBSQ HOSP IP/OBS MODERATE 35: CPT | Performed by: NURSE PRACTITIONER

## 2024-11-07 PROCEDURE — 2580000003 HC RX 258: Performed by: INTERNAL MEDICINE

## 2024-11-07 PROCEDURE — 6360000002 HC RX W HCPCS: Performed by: INTERNAL MEDICINE

## 2024-11-07 PROCEDURE — 6370000000 HC RX 637 (ALT 250 FOR IP)

## 2024-11-07 PROCEDURE — 82607 VITAMIN B-12: CPT

## 2024-11-07 PROCEDURE — 6370000000 HC RX 637 (ALT 250 FOR IP): Performed by: INTERNAL MEDICINE

## 2024-11-07 PROCEDURE — 80048 BASIC METABOLIC PNL TOTAL CA: CPT

## 2024-11-07 PROCEDURE — 97110 THERAPEUTIC EXERCISES: CPT

## 2024-11-07 PROCEDURE — 82947 ASSAY GLUCOSE BLOOD QUANT: CPT

## 2024-11-07 PROCEDURE — 36415 COLL VENOUS BLD VENIPUNCTURE: CPT

## 2024-11-07 PROCEDURE — 99233 SBSQ HOSP IP/OBS HIGH 50: CPT | Performed by: INTERNAL MEDICINE

## 2024-11-07 PROCEDURE — 2580000003 HC RX 258

## 2024-11-07 PROCEDURE — 70551 MRI BRAIN STEM W/O DYE: CPT

## 2024-11-07 PROCEDURE — 99232 SBSQ HOSP IP/OBS MODERATE 35: CPT | Performed by: INTERNAL MEDICINE

## 2024-11-07 PROCEDURE — 2060000000 HC ICU INTERMEDIATE R&B

## 2024-11-07 PROCEDURE — G0108 DIAB MANAGE TRN  PER INDIV: HCPCS

## 2024-11-07 PROCEDURE — 94761 N-INVAS EAR/PLS OXIMETRY MLT: CPT

## 2024-11-07 PROCEDURE — 6360000002 HC RX W HCPCS

## 2024-11-07 PROCEDURE — 83735 ASSAY OF MAGNESIUM: CPT

## 2024-11-07 PROCEDURE — 82746 ASSAY OF FOLIC ACID SERUM: CPT

## 2024-11-07 RX ORDER — POTASSIUM CHLORIDE 1500 MG/1
40 TABLET, EXTENDED RELEASE ORAL ONCE
Status: DISCONTINUED | OUTPATIENT
Start: 2024-11-07 | End: 2024-11-13 | Stop reason: HOSPADM

## 2024-11-07 RX ORDER — INSULIN GLARGINE 100 [IU]/ML
15 INJECTION, SOLUTION SUBCUTANEOUS DAILY
Status: DISCONTINUED | OUTPATIENT
Start: 2024-11-08 | End: 2024-11-13 | Stop reason: HOSPADM

## 2024-11-07 RX ORDER — FUROSEMIDE 10 MG/ML
20 INJECTION INTRAMUSCULAR; INTRAVENOUS ONCE
Status: COMPLETED | OUTPATIENT
Start: 2024-11-07 | End: 2024-11-07

## 2024-11-07 RX ORDER — LANOLIN ALCOHOL/MO/W.PET/CERES
3 CREAM (GRAM) TOPICAL NIGHTLY
Status: DISCONTINUED | OUTPATIENT
Start: 2024-11-07 | End: 2024-11-13 | Stop reason: HOSPADM

## 2024-11-07 RX ADMIN — INSULIN LISPRO 4 UNITS: 100 INJECTION, SOLUTION INTRAVENOUS; SUBCUTANEOUS at 21:56

## 2024-11-07 RX ADMIN — Medication 2000 MG: at 11:41

## 2024-11-07 RX ADMIN — SODIUM CHLORIDE, PRESERVATIVE FREE 10 ML: 5 INJECTION INTRAVENOUS at 08:38

## 2024-11-07 RX ADMIN — PREDNISONE 15 MG: 10 TABLET ORAL at 08:35

## 2024-11-07 RX ADMIN — Medication 3 MG: at 21:57

## 2024-11-07 RX ADMIN — INSULIN GLARGINE 12 UNITS: 100 INJECTION, SOLUTION SUBCUTANEOUS at 08:37

## 2024-11-07 RX ADMIN — FUROSEMIDE 20 MG: 10 INJECTION, SOLUTION INTRAMUSCULAR; INTRAVENOUS at 13:36

## 2024-11-07 RX ADMIN — PANTOPRAZOLE SODIUM 40 MG: 40 TABLET, DELAYED RELEASE ORAL at 08:35

## 2024-11-07 RX ADMIN — INSULIN LISPRO 4 UNITS: 100 INJECTION, SOLUTION INTRAVENOUS; SUBCUTANEOUS at 11:43

## 2024-11-07 RX ADMIN — POTASSIUM BICARBONATE 40 MEQ: 782 TABLET, EFFERVESCENT ORAL at 11:34

## 2024-11-07 RX ADMIN — PANTOPRAZOLE SODIUM 40 MG: 40 TABLET, DELAYED RELEASE ORAL at 16:28

## 2024-11-07 RX ADMIN — ACETAMINOPHEN 650 MG: 325 TABLET ORAL at 16:29

## 2024-11-07 RX ADMIN — INSULIN LISPRO 4 UNITS: 100 INJECTION, SOLUTION INTRAVENOUS; SUBCUTANEOUS at 16:28

## 2024-11-07 RX ADMIN — LEVOTHYROXINE SODIUM 12.5 MCG: 25 TABLET ORAL at 08:34

## 2024-11-07 RX ADMIN — SODIUM CHLORIDE, PRESERVATIVE FREE 10 ML: 5 INJECTION INTRAVENOUS at 21:57

## 2024-11-07 RX ADMIN — LACTULOSE 10 G: 20 SOLUTION ORAL at 13:46

## 2024-11-07 RX ADMIN — LACTULOSE 10 G: 20 SOLUTION ORAL at 08:37

## 2024-11-07 RX ADMIN — SPIRONOLACTONE 50 MG: 25 TABLET, FILM COATED ORAL at 08:37

## 2024-11-07 RX ADMIN — CARVEDILOL 3.12 MG: 6.25 TABLET, FILM COATED ORAL at 08:36

## 2024-11-07 RX ADMIN — LACTULOSE 10 G: 20 SOLUTION ORAL at 21:57

## 2024-11-07 RX ADMIN — LATANOPROST 1 DROP: 50 SOLUTION OPHTHALMIC at 21:59

## 2024-11-07 RX ADMIN — CARVEDILOL 3.12 MG: 6.25 TABLET, FILM COATED ORAL at 16:28

## 2024-11-07 ASSESSMENT — PAIN SCALES - GENERAL
PAINLEVEL_OUTOF10: 2
PAINLEVEL_OUTOF10: 0
PAINLEVEL_OUTOF10: 6

## 2024-11-07 ASSESSMENT — PAIN SCALES - WONG BAKER: WONGBAKER_NUMERICALRESPONSE: NO HURT

## 2024-11-07 ASSESSMENT — PAIN DESCRIPTION - LOCATION: LOCATION: BUTTOCKS

## 2024-11-08 ENCOUNTER — APPOINTMENT (OUTPATIENT)
Dept: CT IMAGING | Age: 79
DRG: 870 | End: 2024-11-08
Payer: MEDICARE

## 2024-11-08 PROBLEM — I63.9 CEREBROVASCULAR ACCIDENT (CVA) (HCC): Status: ACTIVE | Noted: 2024-11-08

## 2024-11-08 PROBLEM — R53.81 DEBILITY: Status: ACTIVE | Noted: 2024-11-08

## 2024-11-08 LAB
ANION GAP SERPL CALCULATED.3IONS-SCNC: 8 MMOL/L (ref 9–16)
BASOPHILS # BLD: <0.03 K/UL (ref 0–0.2)
BASOPHILS NFR BLD: 0 % (ref 0–2)
BUN SERPL-MCNC: 34 MG/DL (ref 8–23)
CALCIUM SERPL-MCNC: 7.5 MG/DL (ref 8.6–10.4)
CHLORIDE SERPL-SCNC: 103 MMOL/L (ref 98–107)
CHOLEST SERPL-MCNC: 135 MG/DL (ref 0–199)
CHOLESTEROL/HDL RATIO: 6.1
CO2 SERPL-SCNC: 29 MMOL/L (ref 20–31)
CREAT SERPL-MCNC: 0.9 MG/DL (ref 0.6–0.9)
EOSINOPHIL # BLD: 0.17 K/UL (ref 0–0.44)
EOSINOPHILS RELATIVE PERCENT: 1 % (ref 1–4)
ERYTHROCYTE [DISTWIDTH] IN BLOOD BY AUTOMATED COUNT: 14.3 % (ref 11.8–14.4)
GFR, ESTIMATED: 65 ML/MIN/1.73M2
GLUCOSE BLD-MCNC: 111 MG/DL (ref 65–105)
GLUCOSE BLD-MCNC: 182 MG/DL (ref 65–105)
GLUCOSE BLD-MCNC: 217 MG/DL (ref 65–105)
GLUCOSE BLD-MCNC: 306 MG/DL (ref 65–105)
GLUCOSE BLD-MCNC: 322 MG/DL (ref 65–105)
GLUCOSE SERPL-MCNC: 122 MG/DL (ref 74–99)
HCT VFR BLD AUTO: 33.5 % (ref 36.3–47.1)
HDLC SERPL-MCNC: 22 MG/DL
HGB BLD-MCNC: 10.5 G/DL (ref 11.9–15.1)
IMM GRANULOCYTES # BLD AUTO: 0.09 K/UL (ref 0–0.3)
IMM GRANULOCYTES NFR BLD: 1 %
LDLC SERPL CALC-MCNC: 79 MG/DL (ref 0–100)
LYMPHOCYTES NFR BLD: 1.69 K/UL (ref 1.1–3.7)
LYMPHOCYTES RELATIVE PERCENT: 14 % (ref 24–43)
MCH RBC QN AUTO: 30.1 PG (ref 25.2–33.5)
MCHC RBC AUTO-ENTMCNC: 31.3 G/DL (ref 28.4–34.8)
MCV RBC AUTO: 96 FL (ref 82.6–102.9)
MONOCYTES NFR BLD: 0.51 K/UL (ref 0.1–1.2)
MONOCYTES NFR BLD: 4 % (ref 3–12)
NEUTROPHILS NFR BLD: 80 % (ref 36–65)
NEUTS SEG NFR BLD: 9.59 K/UL (ref 1.5–8.1)
NRBC BLD-RTO: 0 PER 100 WBC
PLATELET # BLD AUTO: 230 K/UL (ref 138–453)
PMV BLD AUTO: 11.5 FL (ref 8.1–13.5)
POTASSIUM SERPL-SCNC: 3.6 MMOL/L (ref 3.7–5.3)
RBC # BLD AUTO: 3.49 M/UL (ref 3.95–5.11)
SODIUM SERPL-SCNC: 140 MMOL/L (ref 136–145)
TRIGL SERPL-MCNC: 170 MG/DL
VLDLC SERPL CALC-MCNC: 34 MG/DL (ref 1–30)
WBC OTHER # BLD: 12.1 K/UL (ref 3.5–11.3)

## 2024-11-08 PROCEDURE — 99222 1ST HOSP IP/OBS MODERATE 55: CPT | Performed by: STUDENT IN AN ORGANIZED HEALTH CARE EDUCATION/TRAINING PROGRAM

## 2024-11-08 PROCEDURE — 82947 ASSAY GLUCOSE BLOOD QUANT: CPT

## 2024-11-08 PROCEDURE — 99222 1ST HOSP IP/OBS MODERATE 55: CPT | Performed by: PSYCHIATRY & NEUROLOGY

## 2024-11-08 PROCEDURE — 85025 COMPLETE CBC W/AUTO DIFF WBC: CPT

## 2024-11-08 PROCEDURE — 99233 SBSQ HOSP IP/OBS HIGH 50: CPT | Performed by: INTERNAL MEDICINE

## 2024-11-08 PROCEDURE — 2060000000 HC ICU INTERMEDIATE R&B

## 2024-11-08 PROCEDURE — 6370000000 HC RX 637 (ALT 250 FOR IP)

## 2024-11-08 PROCEDURE — 36415 COLL VENOUS BLD VENIPUNCTURE: CPT

## 2024-11-08 PROCEDURE — 99232 SBSQ HOSP IP/OBS MODERATE 35: CPT | Performed by: INTERNAL MEDICINE

## 2024-11-08 PROCEDURE — 6360000004 HC RX CONTRAST MEDICATION

## 2024-11-08 PROCEDURE — 6370000000 HC RX 637 (ALT 250 FOR IP): Performed by: INTERNAL MEDICINE

## 2024-11-08 PROCEDURE — 6360000002 HC RX W HCPCS: Performed by: INTERNAL MEDICINE

## 2024-11-08 PROCEDURE — 99232 SBSQ HOSP IP/OBS MODERATE 35: CPT | Performed by: NURSE PRACTITIONER

## 2024-11-08 PROCEDURE — 80048 BASIC METABOLIC PNL TOTAL CA: CPT

## 2024-11-08 PROCEDURE — 2580000003 HC RX 258

## 2024-11-08 PROCEDURE — 2580000003 HC RX 258: Performed by: INTERNAL MEDICINE

## 2024-11-08 PROCEDURE — 97110 THERAPEUTIC EXERCISES: CPT

## 2024-11-08 PROCEDURE — 80061 LIPID PANEL: CPT

## 2024-11-08 PROCEDURE — 70498 CT ANGIOGRAPHY NECK: CPT

## 2024-11-08 RX ORDER — IOPAMIDOL 755 MG/ML
75 INJECTION, SOLUTION INTRAVASCULAR
Status: COMPLETED | OUTPATIENT
Start: 2024-11-08 | End: 2024-11-08

## 2024-11-08 RX ORDER — ASPIRIN 81 MG/1
81 TABLET, CHEWABLE ORAL DAILY
Status: DISCONTINUED | OUTPATIENT
Start: 2024-11-08 | End: 2024-11-13 | Stop reason: HOSPADM

## 2024-11-08 RX ADMIN — INSULIN LISPRO 4 UNITS: 100 INJECTION, SOLUTION INTRAVENOUS; SUBCUTANEOUS at 23:23

## 2024-11-08 RX ADMIN — Medication 3 MG: at 20:13

## 2024-11-08 RX ADMIN — INSULIN GLARGINE 15 UNITS: 100 INJECTION, SOLUTION SUBCUTANEOUS at 10:58

## 2024-11-08 RX ADMIN — LACTULOSE 10 G: 20 SOLUTION ORAL at 10:15

## 2024-11-08 RX ADMIN — LEVOTHYROXINE SODIUM 12.5 MCG: 25 TABLET ORAL at 08:46

## 2024-11-08 RX ADMIN — PANTOPRAZOLE SODIUM 40 MG: 40 TABLET, DELAYED RELEASE ORAL at 08:46

## 2024-11-08 RX ADMIN — PANTOPRAZOLE SODIUM 40 MG: 40 TABLET, DELAYED RELEASE ORAL at 15:24

## 2024-11-08 RX ADMIN — INSULIN LISPRO 4 UNITS: 100 INJECTION, SOLUTION INTRAVENOUS; SUBCUTANEOUS at 12:36

## 2024-11-08 RX ADMIN — LATANOPROST 1 DROP: 50 SOLUTION OPHTHALMIC at 20:28

## 2024-11-08 RX ADMIN — APIXABAN 5 MG: 5 TABLET, FILM COATED ORAL at 15:24

## 2024-11-08 RX ADMIN — CARVEDILOL 3.12 MG: 6.25 TABLET, FILM COATED ORAL at 08:46

## 2024-11-08 RX ADMIN — IOPAMIDOL 75 ML: 755 INJECTION, SOLUTION INTRAVENOUS at 09:19

## 2024-11-08 RX ADMIN — ASPIRIN 81 MG 81 MG: 81 TABLET ORAL at 12:36

## 2024-11-08 RX ADMIN — INSULIN LISPRO 12 UNITS: 100 INJECTION, SOLUTION INTRAVENOUS; SUBCUTANEOUS at 18:00

## 2024-11-08 RX ADMIN — PREDNISONE 10 MG: 10 TABLET ORAL at 08:47

## 2024-11-08 RX ADMIN — LACTULOSE 10 G: 20 SOLUTION ORAL at 20:13

## 2024-11-08 RX ADMIN — CARVEDILOL 3.12 MG: 6.25 TABLET, FILM COATED ORAL at 18:00

## 2024-11-08 RX ADMIN — APIXABAN 5 MG: 5 TABLET, FILM COATED ORAL at 20:13

## 2024-11-08 RX ADMIN — SPIRONOLACTONE 50 MG: 25 TABLET, FILM COATED ORAL at 08:47

## 2024-11-08 RX ADMIN — Medication 2000 MG: at 15:24

## 2024-11-08 RX ADMIN — SODIUM CHLORIDE, PRESERVATIVE FREE 10 ML: 5 INJECTION INTRAVENOUS at 20:13

## 2024-11-08 NOTE — CONSULTS
Nutrition Note    Consulted for Tube Feedings Order and Management.  Pt remains intubated and sedated, off pressors.  Labs reviewed: Glucose 231-236 mg/dL, K 3.6 mmol/L.      Tube Feedings of Diabetic formula (Glucerna 1.5) have been started - running at 25 mL/hr.  Suggest advancing Diabetic TF to goal rate 45 mL/hr as able.  Will provide 1620 kcals, 89 gm protein per day.    Electronically signed by Milady Bautista RD, LD on 11/2/24 at 10:46 AM EDT    Contact: [phone] / [phone]  
  Musculoskeletal - no joint tenderness, deformity or swelling   Extremities - peripheral pulses normal, no pedal edema, no clubbing or cyanosis   Skin - normal coloration and turgor, no rashes, no suspicious skin lesions noted ,    DATA:    Labs:   CBC:   Recent Labs     11/05/24  0526 11/06/24  0701   WBC 9.7 12.3*   HGB 9.6* 10.7*   HCT 29.7* 35.2*   PLT 95* 136*     BMP:   Recent Labs     11/05/24  0628 11/05/24  1305 11/06/24  1139   * 147* 142   K 2.5* 3.3* 3.8   CO2 30 32* 29   BUN 55*  --  45*   CREATININE 1.2*  --  1.1*   LABGLOM 46*  --  51*   GLUCOSE 178*  --  269*     PT/INR: No results for input(s): \"PROTIME\", \"INR\" in the last 72 hours.    IMAGING DATA:      Primary Problem  Sepsis (HCC)    Active Hospital Problems    Diagnosis Date Noted    History of type 2 diabetes mellitus [Z86.39] 10/30/2024    MONROY (nonalcoholic steatohepatitis) [K75.81] 10/30/2024    E. coli septicemia (HCC) [A41.51] 10/29/2024    Pyelonephritis [N12] 10/29/2024    Acute encephalopathy [G93.40] 10/29/2024    Sepsis (HCC) [A41.9] 10/28/2024    Acute kidney failure (HCC) [N17.9] 10/28/2024    Rhabdomyolysis [M62.82] 10/28/2024    Urinary tract infection [N39.0] 10/28/2024    Atrial fibrillation (HCC) [I48.91] 10/28/2024    Decompensated hepatic cirrhosis (HCC) [K72.90, K74.60] 10/28/2024         IMPRESSION:   Mild thrombocytopenia likely secondary to acute illness  Acute respiratory failure secondary to E. coli sepsis  Pyelonephritis  DKA  History of liver cirrhosis    RECOMMENDATIONS:  Acute on chronic thrombocytopenia secondary to acute illness at chronic liver disease respectively  Patient platelets are very close to baseline and  The patient developed atrial fibrillation and will need anticoagulation  No contraindication to anticoagulation as her platelets are above 100,000  Watch hemoglobin and watch clinically for bleeding  Please contact us as needed      Discussed with patient and Nurse.      Thank you for asking us 
100.8 °F (38.2 °C)  TMax:   Temp (24hrs), Av °F (35 °C), Min:82.9 °F (28.3 °C), Max:100.8 °F (38.2 °C)    Respirations:  Respirations: (!) 31  Pulse:   Pulse: (!) 111  BP:    BP: 105/66  BP Range: Systolic (24hrs), Av , Min:75 , Max:175       Diastolic (24hrs), Av, Min:32, Max:113      Physical Examination:     General:  Intubated, no sedation   HEENT: normocephalic, no throat congestion, dried blood noted to oral mucosa, ET tube in place.  Eyes:   Pupils equal, round and reactive to light, sluggish neck:   Supple  Chest:   Bilateral vesicular breath sounds, no rales or wheezes.  Cardiac:  S1 S2 tachycardic, no murmurs, gallops or rubs.  Abdomen: Soft, non-tender, no masses or organomegaly, BS audible.  :   Mims catheter in place  Neuro:  Intubated, minimally responsive.  No sedation running.  SKIN:  No rashes, good skin turgor.  Extremities:  No edema.    Labs:       Recent Labs     10/28/24  1758 10/29/24  0347 10/29/24  0818   WBC 11.6* 11.3  --    RBC 4.54 4.33  --    HGB 13.9 13.2 13.8   HCT 42.8 39.7 40.2   MCV 94.3 91.7  --    MCH 30.6 30.5  --    MCHC 32.5 33.2  --    RDW 14.4 14.7*  --    PLT See Reflexed IPF Result See Reflexed IPF Result  --    MPV  --  12.2  --       BMP:   Recent Labs     10/28/24  1758 10/29/24  0347 10/29/24  0647    142 144   K 3.7 4.1 4.0    106 108*   CO2 9* 13* 14*   * 118* 117*   CREATININE 2.3* 2.5* 2.6*   GLUCOSE 263* 305* 238*   CALCIUM 8.8 8.3* 8.1*      Phosphorus:     Recent Labs     10/29/24  0647   PHOS 5.3*     Magnesium:    Recent Labs     10/28/24  1758   MG 3.5*       Urinalysis/Chemistries:      Lab Results   Component Value Date/Time    NITRU NEGATIVE 10/28/2024 06:02 PM    COLORU Dark Yellow 10/28/2024 06:02 PM    PHUR 5.5 10/28/2024 06:02 PM    WBCUA TOO NUMEROUS TO COUNT 10/28/2024 06:02 PM    RBCUA 5 TO 10 10/28/2024 06:02 PM    BACTERIA MANY 10/28/2024 06:02 PM    LEUKOCYTESUR MODERATE 10/28/2024 06:02 PM    UROBILINOGEN 
managing.  TSH levels was elevated at 4 then dropped back to normal levels.    Because the right ICA stenosis nonsymptomatic at the moment, we will continue to observe, and left sided punctate centrum semiovale stroke is unrelated does not require endovascular intervention.    Patient will require extensive physical therapy    Case discussed with Dr. Washington attending.    Moise Suero MD   Stroke, Neurocritical Care & Neurointervention  [facility]  [facility] [place]  Electronically signed 11/8/2024 at 6:29 PM    
(!) 94.6 °F (34.8 °C) -- 84 21 95 % -- --   10/29/24 0232 -- (!) 94.6 °F (34.8 °C) -- 83 22 95 % -- --   10/29/24 0231 -- (!) 94.5 °F (34.7 °C) -- 87 21 96 % -- --       Summary of relevant labs:  Labs:  Lactic Acid, Whole Blood4.4 High   ALT91 High U/L SCJ018 High   Creatinine2.5 High   Lactic Acid, Whole Blood5.9 High   WBC11.3   beta-hydroxybutyrate 6.15  Myoglobin 9165  CPK 1564  Lactic acid 3    Micro:  BC E coli 10/28  Nasal MRSA 10/28  Sp cx  10/28  U cx  10/28  UA infective 10/28  Procedures      Cardiology      Imaging:  CXR  Cardiomegaly with pulmonary vascular congestion and pulmonary edema    I have personally reviewed the past medical history, past surgical history, medications, social history, and family history, and I haveupdated the database accordingly.      Allergies:   Patient has no known allergies.     Review of Systems:     Review of Systems   Unable to perform ROS: Intubated       Physical Examination :       Physical Exam  Constitutional:       General: She is not in acute distress.  HENT:      Head: Normocephalic and atraumatic.      Nose: Nose normal.      Mouth/Throat:      Mouth: Mucous membranes are moist.   Eyes:      General: No scleral icterus.     Conjunctiva/sclera: Conjunctivae normal.   Cardiovascular:      Rate and Rhythm: Tachycardia present. Rhythm irregular.      Heart sounds: No murmur heard.     No friction rub.   Pulmonary:      Breath sounds: No stridor. No wheezing, rhonchi or rales.   Abdominal:      General: There is no distension.      Palpations: There is no mass.      Tenderness: There is no abdominal tenderness.      Hernia: No hernia is present.   Musculoskeletal:         General: No swelling or deformity.      Cervical back: Neck supple. No rigidity.   Skin:     Coloration: Skin is not jaundiced.      Findings: No bruising.   Neurological:      Comments: vented   Psychiatric:      Comments: vented         Past Medical History:   No past medical history on 
including those of syntax and sound a- like substitutions which may escape proofreading. In such instances, actual meaning can be extrapolated by contextual derivation.     
radha-.  The patient is currently not a candidate for anticoagulation at this time due to thrombocytopenia.  2D echo reviewed, reveals preserved EF with mild concentric LVH.  Plan to initiate hemodialysis per nephrology today, follow-up recommendations.  Monitor renal function and electrolytes closely.  Continue rest of management as per primary team, we will continue to follow.    Calista Morgan MD  Fellow, Cardiovascular Diseases    [facility]          
Medium-sized hiatal hernia is seen containing proximal stomach. Bowel loops are normal in wall-thickness and caliber. Scattered left-sided colonic diverticulosis is seen. No evidence of appendicitis. Peritoneum/Retroperitoneum: Normal PELVIS Normal reproductive organs Bones/Soft Tissues: Degenerative changes are seen in the lumbar spine. No lytic or blastic lesions are seen. No soft tissue swelling is seen.     1. No evidence of abdominal aortic aneurysm or dissection. 2. Scattered left-sided colonic diverticulosis.     CTA CHEST W WO CONTRAST    Result Date: 10/28/2024  EXAMINATION: CTA OF THE CHEST WITH AND WITHOUT CONTRAST 10/28/2024 5:43 pm TECHNIQUE: CTA of the chest was performed before and after the administration of intravenous contrast.  Multiplanar reformatted images are provided for review.  MIP images are provided for review. Automated exposure control, iterative reconstruction, and/or weight based adjustment of the mA/kV was utilized to reduce the radiation dose to as low as reasonably achievable. COMPARISON: None. HISTORY: ORDERING SYSTEM PROVIDED HISTORY: Concern for dissection, GI bleed TECHNOLOGIST PROVIDED HISTORY: Concern for dissection, GI bleed Additional Contrast?->1 FINDINGS: Aorta: No evidence of thoracic aortic aneurysm or dissection.  No acute abnormality of the aorta. Mediastinum: No evidence of mediastinal lymphadenopathy.  The heart and pericardium demonstrate no acute abnormality.  Mild cardiomegaly and left ventricular hypertrophy seen.  A medium-sized hiatal hernia is seen containing proximal stomach. Lungs/Pleura: The lungs are without acute process.  No focal consolidation or pulmonary edema.  No evidence of pleural effusion or pneumothorax. Upper Abdomen: Limited images of the upper abdomen are unremarkable. Soft Tissues/Bones: No acute bone or soft tissue abnormality.     1. No evidence of thoracic aortic aneurysm or dissection. 2. Medium-sized hiatal hernia containing proximal

## 2024-11-09 ENCOUNTER — APPOINTMENT (OUTPATIENT)
Dept: CT IMAGING | Age: 79
DRG: 870 | End: 2024-11-09
Payer: MEDICARE

## 2024-11-09 PROBLEM — A41.50 GRAM-NEG SEPTICEMIA (HCC): Status: ACTIVE | Noted: 2024-11-09

## 2024-11-09 LAB
ANION GAP SERPL CALCULATED.3IONS-SCNC: 6 MMOL/L (ref 9–16)
BASOPHILS # BLD: <0.03 K/UL (ref 0–0.2)
BASOPHILS NFR BLD: 0 % (ref 0–2)
BUN SERPL-MCNC: 28 MG/DL (ref 8–23)
CALCIUM SERPL-MCNC: 7.5 MG/DL (ref 8.6–10.4)
CHLORIDE SERPL-SCNC: 103 MMOL/L (ref 98–107)
CO2 SERPL-SCNC: 30 MMOL/L (ref 20–31)
CREAT SERPL-MCNC: 0.9 MG/DL (ref 0.6–0.9)
EOSINOPHIL # BLD: 0.22 K/UL (ref 0–0.44)
EOSINOPHILS RELATIVE PERCENT: 2 % (ref 1–4)
ERYTHROCYTE [DISTWIDTH] IN BLOOD BY AUTOMATED COUNT: 14.3 % (ref 11.8–14.4)
GFR, ESTIMATED: 65 ML/MIN/1.73M2
GLUCOSE BLD-MCNC: 109 MG/DL (ref 65–105)
GLUCOSE BLD-MCNC: 198 MG/DL (ref 65–105)
GLUCOSE BLD-MCNC: 200 MG/DL (ref 65–105)
GLUCOSE BLD-MCNC: 229 MG/DL (ref 65–105)
GLUCOSE BLD-MCNC: 240 MG/DL (ref 65–105)
GLUCOSE SERPL-MCNC: 120 MG/DL (ref 74–99)
HCT VFR BLD AUTO: 29.9 % (ref 36.3–47.1)
HGB BLD-MCNC: 9.5 G/DL (ref 11.9–15.1)
IMM GRANULOCYTES # BLD AUTO: 0.05 K/UL (ref 0–0.3)
IMM GRANULOCYTES NFR BLD: 1 %
LYMPHOCYTES NFR BLD: 1.59 K/UL (ref 1.1–3.7)
LYMPHOCYTES RELATIVE PERCENT: 16 % (ref 24–43)
MCH RBC QN AUTO: 30.6 PG (ref 25.2–33.5)
MCHC RBC AUTO-ENTMCNC: 31.8 G/DL (ref 28.4–34.8)
MCV RBC AUTO: 96.5 FL (ref 82.6–102.9)
MONOCYTES NFR BLD: 0.56 K/UL (ref 0.1–1.2)
MONOCYTES NFR BLD: 6 % (ref 3–12)
NEUTROPHILS NFR BLD: 75 % (ref 36–65)
NEUTS SEG NFR BLD: 7.23 K/UL (ref 1.5–8.1)
NRBC BLD-RTO: 0 PER 100 WBC
PLATELET # BLD AUTO: 296 K/UL (ref 138–453)
PMV BLD AUTO: 11.1 FL (ref 8.1–13.5)
POTASSIUM SERPL-SCNC: 4.2 MMOL/L (ref 3.7–5.3)
RBC # BLD AUTO: 3.1 M/UL (ref 3.95–5.11)
SODIUM SERPL-SCNC: 139 MMOL/L (ref 136–145)
WBC OTHER # BLD: 9.7 K/UL (ref 3.5–11.3)

## 2024-11-09 PROCEDURE — 6360000002 HC RX W HCPCS: Performed by: INTERNAL MEDICINE

## 2024-11-09 PROCEDURE — 2060000000 HC ICU INTERMEDIATE R&B

## 2024-11-09 PROCEDURE — 6370000000 HC RX 637 (ALT 250 FOR IP)

## 2024-11-09 PROCEDURE — 6370000000 HC RX 637 (ALT 250 FOR IP): Performed by: INTERNAL MEDICINE

## 2024-11-09 PROCEDURE — 85025 COMPLETE CBC W/AUTO DIFF WBC: CPT

## 2024-11-09 PROCEDURE — 82947 ASSAY GLUCOSE BLOOD QUANT: CPT

## 2024-11-09 PROCEDURE — 6360000002 HC RX W HCPCS

## 2024-11-09 PROCEDURE — 2580000003 HC RX 258: Performed by: INTERNAL MEDICINE

## 2024-11-09 PROCEDURE — 99232 SBSQ HOSP IP/OBS MODERATE 35: CPT | Performed by: PSYCHIATRY & NEUROLOGY

## 2024-11-09 PROCEDURE — 94761 N-INVAS EAR/PLS OXIMETRY MLT: CPT

## 2024-11-09 PROCEDURE — 99232 SBSQ HOSP IP/OBS MODERATE 35: CPT | Performed by: INTERNAL MEDICINE

## 2024-11-09 PROCEDURE — 2580000003 HC RX 258

## 2024-11-09 PROCEDURE — 36415 COLL VENOUS BLD VENIPUNCTURE: CPT

## 2024-11-09 PROCEDURE — 70450 CT HEAD/BRAIN W/O DYE: CPT

## 2024-11-09 PROCEDURE — 80048 BASIC METABOLIC PNL TOTAL CA: CPT

## 2024-11-09 RX ORDER — FUROSEMIDE 10 MG/ML
20 INJECTION INTRAMUSCULAR; INTRAVENOUS ONCE
Status: COMPLETED | OUTPATIENT
Start: 2024-11-09 | End: 2024-11-09

## 2024-11-09 RX ADMIN — LACTULOSE 10 G: 20 SOLUTION ORAL at 20:18

## 2024-11-09 RX ADMIN — LATANOPROST 1 DROP: 50 SOLUTION OPHTHALMIC at 20:19

## 2024-11-09 RX ADMIN — LACTULOSE 10 G: 20 SOLUTION ORAL at 08:51

## 2024-11-09 RX ADMIN — INSULIN LISPRO 4 UNITS: 100 INJECTION, SOLUTION INTRAVENOUS; SUBCUTANEOUS at 23:09

## 2024-11-09 RX ADMIN — SODIUM CHLORIDE, PRESERVATIVE FREE 10 ML: 5 INJECTION INTRAVENOUS at 20:19

## 2024-11-09 RX ADMIN — PANTOPRAZOLE SODIUM 40 MG: 40 TABLET, DELAYED RELEASE ORAL at 16:37

## 2024-11-09 RX ADMIN — APIXABAN 5 MG: 5 TABLET, FILM COATED ORAL at 20:18

## 2024-11-09 RX ADMIN — Medication 2000 MG: at 12:34

## 2024-11-09 RX ADMIN — INSULIN LISPRO 4 UNITS: 100 INJECTION, SOLUTION INTRAVENOUS; SUBCUTANEOUS at 16:37

## 2024-11-09 RX ADMIN — ASPIRIN 81 MG 81 MG: 81 TABLET ORAL at 08:52

## 2024-11-09 RX ADMIN — CARVEDILOL 3.12 MG: 6.25 TABLET, FILM COATED ORAL at 16:37

## 2024-11-09 RX ADMIN — PANTOPRAZOLE SODIUM 40 MG: 40 TABLET, DELAYED RELEASE ORAL at 05:43

## 2024-11-09 RX ADMIN — SODIUM CHLORIDE, PRESERVATIVE FREE 10 ML: 5 INJECTION INTRAVENOUS at 08:52

## 2024-11-09 RX ADMIN — SPIRONOLACTONE 50 MG: 25 TABLET, FILM COATED ORAL at 08:52

## 2024-11-09 RX ADMIN — CARVEDILOL 3.12 MG: 6.25 TABLET, FILM COATED ORAL at 08:52

## 2024-11-09 RX ADMIN — PREDNISONE 10 MG: 10 TABLET ORAL at 08:52

## 2024-11-09 RX ADMIN — LACTULOSE 10 G: 20 SOLUTION ORAL at 16:36

## 2024-11-09 RX ADMIN — INSULIN LISPRO 4 UNITS: 100 INJECTION, SOLUTION INTRAVENOUS; SUBCUTANEOUS at 11:13

## 2024-11-09 RX ADMIN — LEVOTHYROXINE SODIUM 12.5 MCG: 25 TABLET ORAL at 05:43

## 2024-11-09 RX ADMIN — INSULIN GLARGINE 15 UNITS: 100 INJECTION, SOLUTION SUBCUTANEOUS at 08:52

## 2024-11-09 RX ADMIN — FUROSEMIDE 20 MG: 10 INJECTION, SOLUTION INTRAMUSCULAR; INTRAVENOUS at 11:13

## 2024-11-09 RX ADMIN — APIXABAN 5 MG: 5 TABLET, FILM COATED ORAL at 08:52

## 2024-11-09 RX ADMIN — Medication 3 MG: at 20:18

## 2024-11-10 PROBLEM — M62.81 GENERALIZED MUSCLE WEAKNESS: Status: ACTIVE | Noted: 2024-11-10

## 2024-11-10 PROBLEM — G93.41 ACUTE METABOLIC ENCEPHALOPATHY: Status: ACTIVE | Noted: 2024-11-10

## 2024-11-10 LAB
ANION GAP SERPL CALCULATED.3IONS-SCNC: 8 MMOL/L (ref 9–16)
BASOPHILS # BLD: <0.03 K/UL (ref 0–0.2)
BASOPHILS NFR BLD: 0 % (ref 0–2)
BUN SERPL-MCNC: 24 MG/DL (ref 8–23)
CALCIUM SERPL-MCNC: 7.7 MG/DL (ref 8.6–10.4)
CHLORIDE SERPL-SCNC: 104 MMOL/L (ref 98–107)
CO2 SERPL-SCNC: 28 MMOL/L (ref 20–31)
CREAT SERPL-MCNC: 0.8 MG/DL (ref 0.6–0.9)
EOSINOPHIL # BLD: 0.29 K/UL (ref 0–0.44)
EOSINOPHILS RELATIVE PERCENT: 3 % (ref 1–4)
ERYTHROCYTE [DISTWIDTH] IN BLOOD BY AUTOMATED COUNT: 14.1 % (ref 11.8–14.4)
GFR, ESTIMATED: 75 ML/MIN/1.73M2
GLUCOSE BLD-MCNC: 131 MG/DL (ref 65–105)
GLUCOSE BLD-MCNC: 136 MG/DL (ref 65–105)
GLUCOSE BLD-MCNC: 163 MG/DL (ref 65–105)
GLUCOSE BLD-MCNC: 204 MG/DL (ref 65–105)
GLUCOSE SERPL-MCNC: 134 MG/DL (ref 74–99)
HCT VFR BLD AUTO: 28.1 % (ref 36.3–47.1)
HGB BLD-MCNC: 9.1 G/DL (ref 11.9–15.1)
IMM GRANULOCYTES # BLD AUTO: 0.03 K/UL (ref 0–0.3)
IMM GRANULOCYTES NFR BLD: 0 %
LYMPHOCYTES NFR BLD: 1.7 K/UL (ref 1.1–3.7)
LYMPHOCYTES RELATIVE PERCENT: 19 % (ref 24–43)
MCH RBC QN AUTO: 30.8 PG (ref 25.2–33.5)
MCHC RBC AUTO-ENTMCNC: 32.4 G/DL (ref 28.4–34.8)
MCV RBC AUTO: 95.3 FL (ref 82.6–102.9)
MONOCYTES NFR BLD: 0.59 K/UL (ref 0.1–1.2)
MONOCYTES NFR BLD: 7 % (ref 3–12)
NEUTROPHILS NFR BLD: 71 % (ref 36–65)
NEUTS SEG NFR BLD: 6.48 K/UL (ref 1.5–8.1)
NRBC BLD-RTO: 0 PER 100 WBC
PLATELET # BLD AUTO: 317 K/UL (ref 138–453)
PMV BLD AUTO: 10.7 FL (ref 8.1–13.5)
POTASSIUM SERPL-SCNC: 4.1 MMOL/L (ref 3.7–5.3)
RBC # BLD AUTO: 2.95 M/UL (ref 3.95–5.11)
SODIUM SERPL-SCNC: 140 MMOL/L (ref 136–145)
WBC OTHER # BLD: 9.1 K/UL (ref 3.5–11.3)

## 2024-11-10 PROCEDURE — 2580000003 HC RX 258: Performed by: INTERNAL MEDICINE

## 2024-11-10 PROCEDURE — 6370000000 HC RX 637 (ALT 250 FOR IP)

## 2024-11-10 PROCEDURE — 2580000003 HC RX 258

## 2024-11-10 PROCEDURE — APPSS30 APP SPLIT SHARED TIME 16-30 MINUTES: Performed by: NURSE PRACTITIONER

## 2024-11-10 PROCEDURE — 6370000000 HC RX 637 (ALT 250 FOR IP): Performed by: INTERNAL MEDICINE

## 2024-11-10 PROCEDURE — 99232 SBSQ HOSP IP/OBS MODERATE 35: CPT | Performed by: INTERNAL MEDICINE

## 2024-11-10 PROCEDURE — 36415 COLL VENOUS BLD VENIPUNCTURE: CPT

## 2024-11-10 PROCEDURE — 82947 ASSAY GLUCOSE BLOOD QUANT: CPT

## 2024-11-10 PROCEDURE — 85025 COMPLETE CBC W/AUTO DIFF WBC: CPT

## 2024-11-10 PROCEDURE — 6360000002 HC RX W HCPCS: Performed by: INTERNAL MEDICINE

## 2024-11-10 PROCEDURE — 80048 BASIC METABOLIC PNL TOTAL CA: CPT

## 2024-11-10 PROCEDURE — 99232 SBSQ HOSP IP/OBS MODERATE 35: CPT | Performed by: PSYCHIATRY & NEUROLOGY

## 2024-11-10 PROCEDURE — 2060000000 HC ICU INTERMEDIATE R&B

## 2024-11-10 PROCEDURE — 99232 SBSQ HOSP IP/OBS MODERATE 35: CPT | Performed by: NURSE PRACTITIONER

## 2024-11-10 RX ORDER — DEXAMETHASONE SODIUM PHOSPHATE 1 MG/ML
4 SOLUTION/ DROPS OPHTHALMIC 2 TIMES DAILY
Status: DISCONTINUED | OUTPATIENT
Start: 2024-11-10 | End: 2024-11-13 | Stop reason: HOSPADM

## 2024-11-10 RX ORDER — CIPROFLOXACIN HYDROCHLORIDE 3.5 MG/ML
4 SOLUTION/ DROPS TOPICAL 2 TIMES DAILY
Status: DISCONTINUED | OUTPATIENT
Start: 2024-11-10 | End: 2024-11-13 | Stop reason: HOSPADM

## 2024-11-10 RX ADMIN — PREDNISONE 5 MG: 10 TABLET ORAL at 08:39

## 2024-11-10 RX ADMIN — LEVOTHYROXINE SODIUM 12.5 MCG: 25 TABLET ORAL at 05:18

## 2024-11-10 RX ADMIN — APIXABAN 5 MG: 5 TABLET, FILM COATED ORAL at 19:56

## 2024-11-10 RX ADMIN — DEXAMETHASONE SODIUM PHOSPHATE 4 DROP: 1 SOLUTION/ DROPS OPHTHALMIC at 19:56

## 2024-11-10 RX ADMIN — SPIRONOLACTONE 50 MG: 25 TABLET, FILM COATED ORAL at 08:40

## 2024-11-10 RX ADMIN — LACTULOSE 10 G: 20 SOLUTION ORAL at 19:56

## 2024-11-10 RX ADMIN — Medication 3 MG: at 19:56

## 2024-11-10 RX ADMIN — PANTOPRAZOLE SODIUM 40 MG: 40 TABLET, DELAYED RELEASE ORAL at 16:31

## 2024-11-10 RX ADMIN — SODIUM CHLORIDE, PRESERVATIVE FREE 10 ML: 5 INJECTION INTRAVENOUS at 19:56

## 2024-11-10 RX ADMIN — INSULIN LISPRO 4 UNITS: 100 INJECTION, SOLUTION INTRAVENOUS; SUBCUTANEOUS at 12:33

## 2024-11-10 RX ADMIN — SODIUM CHLORIDE, PRESERVATIVE FREE 10 ML: 5 INJECTION INTRAVENOUS at 08:42

## 2024-11-10 RX ADMIN — CARVEDILOL 3.12 MG: 6.25 TABLET, FILM COATED ORAL at 08:41

## 2024-11-10 RX ADMIN — ASPIRIN 81 MG 81 MG: 81 TABLET ORAL at 08:41

## 2024-11-10 RX ADMIN — LATANOPROST 1 DROP: 50 SOLUTION OPHTHALMIC at 19:56

## 2024-11-10 RX ADMIN — LACTULOSE 10 G: 20 SOLUTION ORAL at 08:41

## 2024-11-10 RX ADMIN — CARVEDILOL 3.12 MG: 6.25 TABLET, FILM COATED ORAL at 16:31

## 2024-11-10 RX ADMIN — APIXABAN 5 MG: 5 TABLET, FILM COATED ORAL at 08:40

## 2024-11-10 RX ADMIN — CIPROFLOXACIN HYDROCHLORIDE 4 DROP: 3 SOLUTION/ DROPS OPHTHALMIC at 19:56

## 2024-11-10 RX ADMIN — PANTOPRAZOLE SODIUM 40 MG: 40 TABLET, DELAYED RELEASE ORAL at 05:18

## 2024-11-10 RX ADMIN — CIPROFLOXACIN HYDROCHLORIDE 4 DROP: 3 SOLUTION/ DROPS OPHTHALMIC at 12:34

## 2024-11-10 RX ADMIN — DEXAMETHASONE SODIUM PHOSPHATE 4 DROP: 1 SOLUTION/ DROPS OPHTHALMIC at 14:11

## 2024-11-10 RX ADMIN — Medication 2000 MG: at 12:29

## 2024-11-10 RX ADMIN — LACTULOSE 10 G: 20 SOLUTION ORAL at 16:31

## 2024-11-10 RX ADMIN — INSULIN GLARGINE 15 UNITS: 100 INJECTION, SOLUTION SUBCUTANEOUS at 08:42

## 2024-11-10 ASSESSMENT — PAIN SCALES - GENERAL
PAINLEVEL_OUTOF10: 0
PAINLEVEL_OUTOF10: 10

## 2024-11-10 NOTE — ED NOTES
.Patient Health Questionnaire-9 (PHQ-9)    Over the last 2 weeks, how often have you been bothered by any of the following problems?    1. Little Interest or pleasure in doing things?   [] Not at all  [x] Several Days  [] More than half the day  []  Nearly every day    2. Feeling down, depressed or hopeless?    [x] Not at all  [] Several Days  [] More than half the day  []  Nearly every day    3. Trouble falling or staying asleep, or sleeping too much?   [] Not at all  [x] Several Days  [] More than half the day  []  Nearly every day    4. Feeling tired or having little energy?   [] Not at all  [x] Several Days  [] More than half the day  []  Nearly every day    5. Poor apettite or overeating?   [] Not at all  [x] Several Days  [] More than half the day  []  Nearly every day    6. Feeling bad about yourself-or that you are a failure or have let yourself or your family down?   [x] Not at all  [] Several Days  [] More than half the day  []  Nearly every day    7. Trouble concentrating on things, such as reading the newspaper or watching television?   [] Not at all  [x] Several Days  [] More than half the day  []  Nearly every day    8. Moving or speaking so slowly that other people could have noticed? Or the opposite-being so fidgety or restless that you have been moving around a lot more than usual?   [] Not at all  [x] Several Days  [] More than half the day  []  Nearly every day    9. Thoughts that you would be better off dead or of hurting yourself in some way?   [x] Not at all  [] Several Days  [] More than half the day  []  Nearly every day    Total Score: 6    If you checked off any problems, how difficult have these problems made it for you to do your work, take care of things at home, or get along with other people?   [] Not difficult at all  [x] Somewhat Difficult  [] Very Difficult  []  Extremely Difficult

## 2024-11-11 PROBLEM — A41.9 SEPTIC SHOCK (HCC): Status: ACTIVE | Noted: 2024-11-11

## 2024-11-11 PROBLEM — R65.21 SEPTIC SHOCK (HCC): Status: ACTIVE | Noted: 2024-11-11

## 2024-11-11 LAB
ANION GAP SERPL CALCULATED.3IONS-SCNC: 10 MMOL/L (ref 9–16)
BASOPHILS # BLD: <0.03 K/UL (ref 0–0.2)
BASOPHILS NFR BLD: 0 % (ref 0–2)
BUN SERPL-MCNC: 20 MG/DL (ref 8–23)
CALCIUM SERPL-MCNC: 7.8 MG/DL (ref 8.6–10.4)
CHLORIDE SERPL-SCNC: 103 MMOL/L (ref 98–107)
CO2 SERPL-SCNC: 25 MMOL/L (ref 20–31)
CREAT SERPL-MCNC: 0.8 MG/DL (ref 0.6–0.9)
EOSINOPHIL # BLD: 0.27 K/UL (ref 0–0.44)
EOSINOPHILS RELATIVE PERCENT: 4 % (ref 1–4)
ERYTHROCYTE [DISTWIDTH] IN BLOOD BY AUTOMATED COUNT: 14.1 % (ref 11.8–14.4)
GFR, ESTIMATED: 75 ML/MIN/1.73M2
GLUCOSE BLD-MCNC: 115 MG/DL (ref 65–105)
GLUCOSE BLD-MCNC: 149 MG/DL (ref 65–105)
GLUCOSE BLD-MCNC: 180 MG/DL (ref 65–105)
GLUCOSE BLD-MCNC: 221 MG/DL (ref 65–105)
GLUCOSE SERPL-MCNC: 121 MG/DL (ref 74–99)
HCT VFR BLD AUTO: 30.5 % (ref 36.3–47.1)
HGB BLD-MCNC: 9.5 G/DL (ref 11.9–15.1)
IMM GRANULOCYTES # BLD AUTO: 0.03 K/UL (ref 0–0.3)
IMM GRANULOCYTES NFR BLD: 0 %
INTERVENTION: NORMAL
LYMPHOCYTES NFR BLD: 1.51 K/UL (ref 1.1–3.7)
LYMPHOCYTES RELATIVE PERCENT: 20 % (ref 24–43)
MCH RBC QN AUTO: 30.6 PG (ref 25.2–33.5)
MCHC RBC AUTO-ENTMCNC: 31.1 G/DL (ref 28.4–34.8)
MCV RBC AUTO: 98.4 FL (ref 82.6–102.9)
MONOCYTES NFR BLD: 0.52 K/UL (ref 0.1–1.2)
MONOCYTES NFR BLD: 7 % (ref 3–12)
NEUTROPHILS NFR BLD: 69 % (ref 36–65)
NEUTS SEG NFR BLD: 5.25 K/UL (ref 1.5–8.1)
NRBC BLD-RTO: 0 PER 100 WBC
PLATELET # BLD AUTO: 326 K/UL (ref 138–453)
PMV BLD AUTO: 10.7 FL (ref 8.1–13.5)
POTASSIUM SERPL-SCNC: 4.3 MMOL/L (ref 3.7–5.3)
RBC # BLD AUTO: 3.1 M/UL (ref 3.95–5.11)
SODIUM SERPL-SCNC: 138 MMOL/L (ref 136–145)
WBC OTHER # BLD: 7.6 K/UL (ref 3.5–11.3)

## 2024-11-11 PROCEDURE — 99232 SBSQ HOSP IP/OBS MODERATE 35: CPT | Performed by: NURSE PRACTITIONER

## 2024-11-11 PROCEDURE — 6370000000 HC RX 637 (ALT 250 FOR IP): Performed by: INTERNAL MEDICINE

## 2024-11-11 PROCEDURE — 99232 SBSQ HOSP IP/OBS MODERATE 35: CPT | Performed by: INTERNAL MEDICINE

## 2024-11-11 PROCEDURE — 36415 COLL VENOUS BLD VENIPUNCTURE: CPT

## 2024-11-11 PROCEDURE — 6360000002 HC RX W HCPCS: Performed by: INTERNAL MEDICINE

## 2024-11-11 PROCEDURE — 80048 BASIC METABOLIC PNL TOTAL CA: CPT

## 2024-11-11 PROCEDURE — 2580000003 HC RX 258: Performed by: INTERNAL MEDICINE

## 2024-11-11 PROCEDURE — 6370000000 HC RX 637 (ALT 250 FOR IP)

## 2024-11-11 PROCEDURE — 85025 COMPLETE CBC W/AUTO DIFF WBC: CPT

## 2024-11-11 PROCEDURE — 97530 THERAPEUTIC ACTIVITIES: CPT

## 2024-11-11 PROCEDURE — 97110 THERAPEUTIC EXERCISES: CPT

## 2024-11-11 PROCEDURE — 2580000003 HC RX 258

## 2024-11-11 PROCEDURE — 97535 SELF CARE MNGMENT TRAINING: CPT

## 2024-11-11 PROCEDURE — 2060000000 HC ICU INTERMEDIATE R&B

## 2024-11-11 PROCEDURE — 82947 ASSAY GLUCOSE BLOOD QUANT: CPT

## 2024-11-11 RX ORDER — POLYETHYLENE GLYCOL 3350 17 G/17G
17 POWDER, FOR SOLUTION ORAL ONCE
Status: COMPLETED | OUTPATIENT
Start: 2024-11-11 | End: 2024-11-11

## 2024-11-11 RX ADMIN — LEVOTHYROXINE SODIUM 12.5 MCG: 25 TABLET ORAL at 05:52

## 2024-11-11 RX ADMIN — ASPIRIN 81 MG 81 MG: 81 TABLET ORAL at 07:50

## 2024-11-11 RX ADMIN — INSULIN GLARGINE 15 UNITS: 100 INJECTION, SOLUTION SUBCUTANEOUS at 07:51

## 2024-11-11 RX ADMIN — LACTULOSE 10 G: 20 SOLUTION ORAL at 07:47

## 2024-11-11 RX ADMIN — INSULIN LISPRO 4 UNITS: 100 INJECTION, SOLUTION INTRAVENOUS; SUBCUTANEOUS at 13:14

## 2024-11-11 RX ADMIN — PANTOPRAZOLE SODIUM 40 MG: 40 TABLET, DELAYED RELEASE ORAL at 05:52

## 2024-11-11 RX ADMIN — DEXAMETHASONE SODIUM PHOSPHATE 4 DROP: 1 SOLUTION/ DROPS OPHTHALMIC at 09:55

## 2024-11-11 RX ADMIN — PANTOPRAZOLE SODIUM 40 MG: 40 TABLET, DELAYED RELEASE ORAL at 17:06

## 2024-11-11 RX ADMIN — PREDNISONE 5 MG: 10 TABLET ORAL at 07:48

## 2024-11-11 RX ADMIN — CIPROFLOXACIN HYDROCHLORIDE 4 DROP: 3 SOLUTION/ DROPS OPHTHALMIC at 07:50

## 2024-11-11 RX ADMIN — POLYETHYLENE GLYCOL 3350 17 G: 17 POWDER, FOR SOLUTION ORAL at 17:06

## 2024-11-11 RX ADMIN — LACTULOSE 10 G: 20 SOLUTION ORAL at 13:14

## 2024-11-11 RX ADMIN — Medication 3 MG: at 20:28

## 2024-11-11 RX ADMIN — CIPROFLOXACIN HYDROCHLORIDE 4 DROP: 3 SOLUTION/ DROPS OPHTHALMIC at 20:28

## 2024-11-11 RX ADMIN — DEXAMETHASONE SODIUM PHOSPHATE 4 DROP: 1 SOLUTION/ DROPS OPHTHALMIC at 20:28

## 2024-11-11 RX ADMIN — Medication 2000 MG: at 10:03

## 2024-11-11 RX ADMIN — LATANOPROST 1 DROP: 50 SOLUTION OPHTHALMIC at 20:28

## 2024-11-11 RX ADMIN — APIXABAN 5 MG: 5 TABLET, FILM COATED ORAL at 20:27

## 2024-11-11 RX ADMIN — CARVEDILOL 3.12 MG: 6.25 TABLET, FILM COATED ORAL at 17:06

## 2024-11-11 RX ADMIN — CARVEDILOL 3.12 MG: 6.25 TABLET, FILM COATED ORAL at 07:48

## 2024-11-11 RX ADMIN — APIXABAN 5 MG: 5 TABLET, FILM COATED ORAL at 07:50

## 2024-11-11 RX ADMIN — LACTULOSE 10 G: 20 SOLUTION ORAL at 20:27

## 2024-11-11 RX ADMIN — SPIRONOLACTONE 50 MG: 25 TABLET, FILM COATED ORAL at 07:49

## 2024-11-11 RX ADMIN — SODIUM CHLORIDE, PRESERVATIVE FREE 10 ML: 5 INJECTION INTRAVENOUS at 20:28

## 2024-11-11 RX ADMIN — SODIUM CHLORIDE, PRESERVATIVE FREE 10 ML: 5 INJECTION INTRAVENOUS at 07:50

## 2024-11-11 RX ADMIN — INSULIN LISPRO 4 UNITS: 100 INJECTION, SOLUTION INTRAVENOUS; SUBCUTANEOUS at 17:12

## 2024-11-11 ASSESSMENT — ENCOUNTER SYMPTOMS
VOICE CHANGE: 0
DIARRHEA: 0
ABDOMINAL DISTENTION: 0
EYE REDNESS: 0
VOMITING: 0
ABDOMINAL PAIN: 0
EYE PAIN: 0
APNEA: 0
WHEEZING: 0
STRIDOR: 0
CHEST TIGHTNESS: 0
COLOR CHANGE: 0
CONSTIPATION: 0
SHORTNESS OF BREATH: 0
CHOKING: 0
BACK PAIN: 0
EYE DISCHARGE: 0
NAUSEA: 0
COUGH: 0

## 2024-11-12 LAB
ANION GAP SERPL CALCULATED.3IONS-SCNC: 10 MMOL/L (ref 9–16)
BASOPHILS # BLD: <0.03 K/UL (ref 0–0.2)
BASOPHILS NFR BLD: 0 % (ref 0–2)
BUN SERPL-MCNC: 17 MG/DL (ref 8–23)
CALCIUM SERPL-MCNC: 8.4 MG/DL (ref 8.6–10.4)
CHLORIDE SERPL-SCNC: 105 MMOL/L (ref 98–107)
CO2 SERPL-SCNC: 24 MMOL/L (ref 20–31)
CREAT SERPL-MCNC: 0.9 MG/DL (ref 0.6–0.9)
EOSINOPHIL # BLD: 0.26 K/UL (ref 0–0.44)
EOSINOPHILS RELATIVE PERCENT: 3 % (ref 1–4)
ERYTHROCYTE [DISTWIDTH] IN BLOOD BY AUTOMATED COUNT: 14.1 % (ref 11.8–14.4)
GFR, ESTIMATED: 65 ML/MIN/1.73M2
GLUCOSE BLD-MCNC: 110 MG/DL (ref 65–105)
GLUCOSE BLD-MCNC: 146 MG/DL (ref 65–105)
GLUCOSE BLD-MCNC: 164 MG/DL (ref 65–105)
GLUCOSE BLD-MCNC: 166 MG/DL (ref 65–105)
GLUCOSE BLD-MCNC: 168 MG/DL (ref 65–105)
GLUCOSE BLD-MCNC: 194 MG/DL (ref 65–105)
GLUCOSE SERPL-MCNC: 117 MG/DL (ref 74–99)
HCT VFR BLD AUTO: 28.8 % (ref 36.3–47.1)
HGB BLD-MCNC: 9.2 G/DL (ref 11.9–15.1)
IMM GRANULOCYTES # BLD AUTO: <0.03 K/UL (ref 0–0.3)
IMM GRANULOCYTES NFR BLD: 0 %
LYMPHOCYTES NFR BLD: 1.67 K/UL (ref 1.1–3.7)
LYMPHOCYTES RELATIVE PERCENT: 22 % (ref 24–43)
MCH RBC QN AUTO: 30.9 PG (ref 25.2–33.5)
MCHC RBC AUTO-ENTMCNC: 31.9 G/DL (ref 28.4–34.8)
MCV RBC AUTO: 96.6 FL (ref 82.6–102.9)
MONOCYTES NFR BLD: 0.6 K/UL (ref 0.1–1.2)
MONOCYTES NFR BLD: 8 % (ref 3–12)
NEUTROPHILS NFR BLD: 67 % (ref 36–65)
NEUTS SEG NFR BLD: 5.14 K/UL (ref 1.5–8.1)
NRBC BLD-RTO: 0 PER 100 WBC
PLATELET # BLD AUTO: 331 K/UL (ref 138–453)
PMV BLD AUTO: 10.7 FL (ref 8.1–13.5)
POTASSIUM SERPL-SCNC: 4.3 MMOL/L (ref 3.7–5.3)
RBC # BLD AUTO: 2.98 M/UL (ref 3.95–5.11)
SODIUM SERPL-SCNC: 139 MMOL/L (ref 136–145)
WBC OTHER # BLD: 7.7 K/UL (ref 3.5–11.3)

## 2024-11-12 PROCEDURE — 6360000002 HC RX W HCPCS

## 2024-11-12 PROCEDURE — 6370000000 HC RX 637 (ALT 250 FOR IP): Performed by: INTERNAL MEDICINE

## 2024-11-12 PROCEDURE — 92526 ORAL FUNCTION THERAPY: CPT

## 2024-11-12 PROCEDURE — 82947 ASSAY GLUCOSE BLOOD QUANT: CPT

## 2024-11-12 PROCEDURE — 99232 SBSQ HOSP IP/OBS MODERATE 35: CPT | Performed by: INTERNAL MEDICINE

## 2024-11-12 PROCEDURE — 2060000000 HC ICU INTERMEDIATE R&B

## 2024-11-12 PROCEDURE — 6370000000 HC RX 637 (ALT 250 FOR IP)

## 2024-11-12 PROCEDURE — 85025 COMPLETE CBC W/AUTO DIFF WBC: CPT

## 2024-11-12 PROCEDURE — 2580000003 HC RX 258

## 2024-11-12 PROCEDURE — 36415 COLL VENOUS BLD VENIPUNCTURE: CPT

## 2024-11-12 PROCEDURE — 80048 BASIC METABOLIC PNL TOTAL CA: CPT

## 2024-11-12 RX ORDER — ATORVASTATIN CALCIUM 20 MG/1
20 TABLET, FILM COATED ORAL NIGHTLY
Qty: 30 TABLET | Refills: 3 | Status: SHIPPED | OUTPATIENT
Start: 2024-11-12

## 2024-11-12 RX ORDER — LACTULOSE 10 G/15ML
10 SOLUTION ORAL 3 TIMES DAILY
Qty: 1350 ML | Refills: 0 | Status: SHIPPED | OUTPATIENT
Start: 2024-11-12 | End: 2024-12-12

## 2024-11-12 RX ORDER — HYDRALAZINE HYDROCHLORIDE 20 MG/ML
5 INJECTION INTRAMUSCULAR; INTRAVENOUS ONCE
Status: COMPLETED | OUTPATIENT
Start: 2024-11-13 | End: 2024-11-12

## 2024-11-12 RX ORDER — PANTOPRAZOLE SODIUM 40 MG/1
40 TABLET, DELAYED RELEASE ORAL
Qty: 30 TABLET | Refills: 3 | Status: SHIPPED | OUTPATIENT
Start: 2024-11-12

## 2024-11-12 RX ORDER — INSULIN GLARGINE 100 [IU]/ML
15 INJECTION, SOLUTION SUBCUTANEOUS DAILY
Qty: 5 ADJUSTABLE DOSE PRE-FILLED PEN SYRINGE | Refills: 2 | Status: SHIPPED | OUTPATIENT
Start: 2024-11-12

## 2024-11-12 RX ORDER — INSULIN LISPRO 100 [IU]/ML
0-16 INJECTION, SOLUTION INTRAVENOUS; SUBCUTANEOUS
Qty: 14.4 ML | Refills: 0 | Status: SHIPPED | OUTPATIENT
Start: 2024-11-12 | End: 2024-12-12

## 2024-11-12 RX ORDER — LEVOTHYROXINE SODIUM 25 UG/1
12.5 TABLET ORAL DAILY
Qty: 30 TABLET | Refills: 3 | Status: SHIPPED | OUTPATIENT
Start: 2024-11-13

## 2024-11-12 RX ORDER — SPIRONOLACTONE 50 MG/1
50 TABLET, FILM COATED ORAL DAILY
Qty: 30 TABLET | Refills: 3 | Status: SHIPPED | OUTPATIENT
Start: 2024-11-13

## 2024-11-12 RX ORDER — ATORVASTATIN CALCIUM 20 MG/1
20 TABLET, FILM COATED ORAL NIGHTLY
Status: DISCONTINUED | OUTPATIENT
Start: 2024-11-12 | End: 2024-11-13 | Stop reason: HOSPADM

## 2024-11-12 RX ORDER — CARVEDILOL 3.12 MG/1
3.12 TABLET ORAL 2 TIMES DAILY WITH MEALS
Qty: 60 TABLET | Refills: 3 | Status: SHIPPED | OUTPATIENT
Start: 2024-11-12

## 2024-11-12 RX ORDER — HYDRALAZINE HYDROCHLORIDE 20 MG/ML
5 INJECTION INTRAMUSCULAR; INTRAVENOUS ONCE
Status: COMPLETED | OUTPATIENT
Start: 2024-11-12 | End: 2024-11-12

## 2024-11-12 RX ORDER — ASPIRIN 81 MG/1
81 TABLET, CHEWABLE ORAL DAILY
Qty: 30 TABLET | Refills: 3 | Status: SHIPPED | OUTPATIENT
Start: 2024-11-13

## 2024-11-12 RX ADMIN — INSULIN GLARGINE 15 UNITS: 100 INJECTION, SOLUTION SUBCUTANEOUS at 09:55

## 2024-11-12 RX ADMIN — HYDRALAZINE HYDROCHLORIDE 5 MG: 20 INJECTION, SOLUTION INTRAMUSCULAR; INTRAVENOUS at 00:23

## 2024-11-12 RX ADMIN — PANTOPRAZOLE SODIUM 40 MG: 40 TABLET, DELAYED RELEASE ORAL at 05:39

## 2024-11-12 RX ADMIN — APIXABAN 5 MG: 5 TABLET, FILM COATED ORAL at 20:18

## 2024-11-12 RX ADMIN — LACTULOSE 10 G: 20 SOLUTION ORAL at 09:56

## 2024-11-12 RX ADMIN — LEVOTHYROXINE SODIUM 12.5 MCG: 25 TABLET ORAL at 05:39

## 2024-11-12 RX ADMIN — CARVEDILOL 3.12 MG: 6.25 TABLET, FILM COATED ORAL at 17:40

## 2024-11-12 RX ADMIN — APIXABAN 5 MG: 5 TABLET, FILM COATED ORAL at 09:55

## 2024-11-12 RX ADMIN — ACETAMINOPHEN 650 MG: 325 TABLET ORAL at 11:51

## 2024-11-12 RX ADMIN — SODIUM CHLORIDE, PRESERVATIVE FREE 10 ML: 5 INJECTION INTRAVENOUS at 09:55

## 2024-11-12 RX ADMIN — INSULIN LISPRO 4 UNITS: 100 INJECTION, SOLUTION INTRAVENOUS; SUBCUTANEOUS at 11:50

## 2024-11-12 RX ADMIN — LACTULOSE 10 G: 20 SOLUTION ORAL at 14:15

## 2024-11-12 RX ADMIN — DEXAMETHASONE SODIUM PHOSPHATE 4 DROP: 1 SOLUTION/ DROPS OPHTHALMIC at 10:05

## 2024-11-12 RX ADMIN — LACTULOSE 10 G: 20 SOLUTION ORAL at 20:18

## 2024-11-12 RX ADMIN — HYDRALAZINE HYDROCHLORIDE 5 MG: 20 INJECTION, SOLUTION INTRAMUSCULAR; INTRAVENOUS at 23:38

## 2024-11-12 RX ADMIN — SODIUM CHLORIDE, PRESERVATIVE FREE 10 ML: 5 INJECTION INTRAVENOUS at 20:19

## 2024-11-12 RX ADMIN — PANTOPRAZOLE SODIUM 40 MG: 40 TABLET, DELAYED RELEASE ORAL at 17:40

## 2024-11-12 RX ADMIN — CARVEDILOL 3.12 MG: 6.25 TABLET, FILM COATED ORAL at 09:54

## 2024-11-12 RX ADMIN — ATORVASTATIN CALCIUM 20 MG: 20 TABLET, FILM COATED ORAL at 20:18

## 2024-11-12 RX ADMIN — DEXAMETHASONE SODIUM PHOSPHATE 4 DROP: 1 SOLUTION/ DROPS OPHTHALMIC at 20:19

## 2024-11-12 RX ADMIN — CIPROFLOXACIN HYDROCHLORIDE 4 DROP: 3 SOLUTION/ DROPS OPHTHALMIC at 20:19

## 2024-11-12 RX ADMIN — SPIRONOLACTONE 50 MG: 25 TABLET, FILM COATED ORAL at 09:55

## 2024-11-12 RX ADMIN — Medication 3 MG: at 20:18

## 2024-11-12 RX ADMIN — LATANOPROST 1 DROP: 50 SOLUTION OPHTHALMIC at 20:19

## 2024-11-12 RX ADMIN — ASPIRIN 81 MG 81 MG: 81 TABLET ORAL at 09:55

## 2024-11-12 RX ADMIN — CIPROFLOXACIN HYDROCHLORIDE 4 DROP: 3 SOLUTION/ DROPS OPHTHALMIC at 10:05

## 2024-11-12 ASSESSMENT — ENCOUNTER SYMPTOMS
SHORTNESS OF BREATH: 0
COUGH: 0
EYE DISCHARGE: 0
EYE REDNESS: 0
ABDOMINAL PAIN: 0
CHOKING: 0
STRIDOR: 0
APNEA: 0
CONSTIPATION: 0
VOMITING: 0
EYE PAIN: 0
COLOR CHANGE: 0
ABDOMINAL DISTENTION: 0
CHEST TIGHTNESS: 0
NAUSEA: 0
BACK PAIN: 0
WHEEZING: 0

## 2024-11-12 ASSESSMENT — PAIN SCALES - GENERAL
PAINLEVEL_OUTOF10: 3
PAINLEVEL_OUTOF10: 0
PAINLEVEL_OUTOF10: 0
PAINLEVEL_OUTOF10: 10
PAINLEVEL_OUTOF10: 0

## 2024-11-12 ASSESSMENT — PAIN DESCRIPTION - DESCRIPTORS: DESCRIPTORS: DISCOMFORT

## 2024-11-12 ASSESSMENT — PAIN DESCRIPTION - LOCATION: LOCATION: BACK

## 2024-11-12 NOTE — DISCHARGE INSTRUCTIONS
You were admitted with septic shock and treated with IV antibiotics.  You also had acute renal failure requiring 2 sessions of hemodialysis after which her renal functions improved.    He also developed new onset atrial fibrillation for which you were evaluated by cardiology.  You had an echocardiogram done that showed normal heart function.  You have been started on blood thinner for A-fib.  Start taking Eliquis 5 mg twice daily on discharge and watch for any signs or symptoms of bleeding and bruising.  Start taking carvedilol Coreg 3.125 mg twice daily on discharge for heart rate control and blood pressure recommended by cardiologist.    Your blood sugars were uncontrolled on admission.  You were on medication for diabetes called Amaryl before admission.  Stop taking Amaryl on discharge.  You are getting discharged on insulin.  Start taking insulin Lantus 15 units daily along with sliding scale as needed.  Check your blood sugars 3-4 times daily.    You were found to have acute infarct on MRI brain for which you were evaluated by neurologist.  You are recommended to continue taking aspirin and statin on discharge.  Schedule appointment with neurologist for follow-up.    Schedule appointment with your PCP on discharge in 1 week time.    In case of any worsening or new symptoms go to the nearest ER to seek further medical care.

## 2024-11-12 NOTE — DISCHARGE INSTR - COC
Continuity of Care Form    Patient Name: Claudia Malone   :  1945  MRN:  [id number]    Admit date:  10/28/2024  Discharge date:  2024    Code Status Order: Full Code   Advance Directives:   Advance Care Flowsheet Documentation             Admitting Physician:  No admitting provider for patient encounter.  PCP: Aime Barrett MD    Discharging Nurse: cielo  Discharging Hospital Unit/Room#:   Discharging Unit Phone Number: [phone]    Emergency Contact:   Extended Emergency Contact Information  Primary Emergency Contact: Aida Malone  Home Phone: [phone]  Relation: Child  Preferred language: English   needed? No  Secondary Emergency Contact: Cecil Malone  Home Phone: [phone]  Relation: Brother/Sister  Preferred language: English   needed? No    Past Surgical History:  No past surgical history on file.    Immunization History:   Immunization History   Administered Date(s) Administered    COVID-19, PFIZER PURPLE top, DILUTE for use, (age 12 y+), 30mcg/0.3mL 2021, 2021       Active Problems:  Patient Active Problem List   Diagnosis Code    Sepsis (HCC) A41.9    Acute hypoxic respiratory failure J96.01    Metabolic acidosis E87.20    Acute kidney failure (HCC) N17.9    Rhabdomyolysis M62.82    Urinary tract infection N39.0    Atrial fibrillation (HCC) I48.91    Decompensated hepatic cirrhosis (HCC) K72.90, K74.60    E. coli septicemia (HCC) A41.51    Pyelonephritis N12    Acute encephalopathy G93.40    JUAN ANTONIO (acute kidney injury) (HCC) N17.9    History of type 2 diabetes mellitus Z86.39    MONROY (nonalcoholic steatohepatitis) K75.81    Thrombocytopenia (HCC) D69.6    Cerebrovascular accident (CVA) (HCC) I63.9    Debility R53.81    Gram-neg septicemia (HCC) A41.50    Acute metabolic encephalopathy G93.41    Generalized muscle weakness M62.81    Septic shock (HCC) A41.9, R65.21       Isolation/Infection:   Isolation            No Isolation

## 2024-11-13 VITALS
HEIGHT: 64 IN | TEMPERATURE: 98.7 F | SYSTOLIC BLOOD PRESSURE: 137 MMHG | DIASTOLIC BLOOD PRESSURE: 79 MMHG | OXYGEN SATURATION: 95 % | RESPIRATION RATE: 18 BRPM | HEART RATE: 80 BPM | BODY MASS INDEX: 35.32 KG/M2 | WEIGHT: 206.9 LBS

## 2024-11-13 LAB
ANION GAP SERPL CALCULATED.3IONS-SCNC: 9 MMOL/L (ref 9–16)
BASOPHILS # BLD: <0.03 K/UL (ref 0–0.2)
BASOPHILS NFR BLD: 0 % (ref 0–2)
BUN SERPL-MCNC: 14 MG/DL (ref 8–23)
CALCIUM SERPL-MCNC: 8.1 MG/DL (ref 8.6–10.4)
CHLORIDE SERPL-SCNC: 105 MMOL/L (ref 98–107)
CO2 SERPL-SCNC: 23 MMOL/L (ref 20–31)
CREAT SERPL-MCNC: 0.9 MG/DL (ref 0.6–0.9)
EOSINOPHIL # BLD: 0.3 K/UL (ref 0–0.44)
EOSINOPHILS RELATIVE PERCENT: 4 % (ref 1–4)
ERYTHROCYTE [DISTWIDTH] IN BLOOD BY AUTOMATED COUNT: 14.2 % (ref 11.8–14.4)
GFR, ESTIMATED: 65 ML/MIN/1.73M2
GLUCOSE BLD-MCNC: 115 MG/DL (ref 65–105)
GLUCOSE SERPL-MCNC: 132 MG/DL (ref 74–99)
HCT VFR BLD AUTO: 27.8 % (ref 36.3–47.1)
HGB BLD-MCNC: 8.9 G/DL (ref 11.9–15.1)
IMM GRANULOCYTES # BLD AUTO: 0.03 K/UL (ref 0–0.3)
IMM GRANULOCYTES NFR BLD: 0 %
LYMPHOCYTES NFR BLD: 1.74 K/UL (ref 1.1–3.7)
LYMPHOCYTES RELATIVE PERCENT: 22 % (ref 24–43)
MCH RBC QN AUTO: 30.4 PG (ref 25.2–33.5)
MCHC RBC AUTO-ENTMCNC: 32 G/DL (ref 28.4–34.8)
MCV RBC AUTO: 94.9 FL (ref 82.6–102.9)
MONOCYTES NFR BLD: 0.58 K/UL (ref 0.1–1.2)
MONOCYTES NFR BLD: 7 % (ref 3–12)
NEUTROPHILS NFR BLD: 67 % (ref 36–65)
NEUTS SEG NFR BLD: 5.14 K/UL (ref 1.5–8.1)
NRBC BLD-RTO: 0 PER 100 WBC
PLATELET # BLD AUTO: 316 K/UL (ref 138–453)
PMV BLD AUTO: 10 FL (ref 8.1–13.5)
POTASSIUM SERPL-SCNC: 4.4 MMOL/L (ref 3.7–5.3)
RBC # BLD AUTO: 2.93 M/UL (ref 3.95–5.11)
SODIUM SERPL-SCNC: 137 MMOL/L (ref 136–145)
WBC OTHER # BLD: 7.8 K/UL (ref 3.5–11.3)

## 2024-11-13 PROCEDURE — 36415 COLL VENOUS BLD VENIPUNCTURE: CPT

## 2024-11-13 PROCEDURE — 85025 COMPLETE CBC W/AUTO DIFF WBC: CPT

## 2024-11-13 PROCEDURE — 6370000000 HC RX 637 (ALT 250 FOR IP): Performed by: INTERNAL MEDICINE

## 2024-11-13 PROCEDURE — 6370000000 HC RX 637 (ALT 250 FOR IP)

## 2024-11-13 PROCEDURE — 80048 BASIC METABOLIC PNL TOTAL CA: CPT

## 2024-11-13 PROCEDURE — 82947 ASSAY GLUCOSE BLOOD QUANT: CPT

## 2024-11-13 RX ADMIN — LEVOTHYROXINE SODIUM 12.5 MCG: 25 TABLET ORAL at 05:30

## 2024-11-13 RX ADMIN — PANTOPRAZOLE SODIUM 40 MG: 40 TABLET, DELAYED RELEASE ORAL at 05:31

## 2024-11-13 NOTE — PROGRESS NOTES
[facility] -   Infectious diseases evaluation  admission date 10/28/2024    reason for consultation:   Acute septic shock with multiorgan failure    Impression :   Current:  Severe SIRS with septic shock from pyelonephritis with multiorgan failure/JUAN ANTONIO respiratory failure acute mental status changes  Acute encephalopathy related to the UTI  hypothermia  CRP elevation 476  Procalcitonin elevation 70.70  Thrombocytopenia  E. coli septicemia likely from pyelonephritis-CT abdomen no obstruction  Underlying decompensated liver cirrhosis with MONROY and esophageal varices grade 3 post banding in the past  Transaminitis  - shock liver vs from rhabdomyolysis  New onset A-fib with RVR  Diabetes with DKA and metabolic acidosis high Anion gap  Rhabdomyolysis  JUAN ANTONIO  Diverticulosis but no diverticulitis    Other:  Prolonged QTc 554 - defer quinolones  or macrolides  Discussion / summary of stay / plan of care/ Recommendations:     HENCE:   E coli blood and urine -not ESBL and R levaquin  ceftriaxone 2 g daily till 11/10   WBC better 13 - creat better 1.5   multiorgan failure -improving  encephalopathy,  following commands -CT head neg - extubated 11/4  Disc w  fam on bedside and RN      Infection Control Recommendations   [place] Precautions      Antimicrobial Stewardship Recommendations   Simplification of therapy  Targeted therapy      History of Present Illness:   Initial history:  Claudia Malone is a 79 y.o.-year-old female comes to the ER because of lack of response found at home by family member.  Unclear for how long, possibly days, she was laying in the kitchen moaning after vomiting and having stools, EMS was called she was intubated for airway protection, she was hypotensive started on pressors.  She was also hypothermic, core temperature 28, started on a warmer, creatinine was up to 2.3 urine analysis infective and blood cultures came back with E. coli x 2.  Her CRP was 
          [facility] -   Infectious diseases evaluation  admission date 10/28/2024    reason for consultation:   Acute septic shock with multiorgan failure    Impression :   Current:  Severe SIRS with septic shock from pyelonephritis with multiorgan failure/JUAN ANTONIO respiratory failure acute mental status changes  Acute encephalopathy related to the UTI  hypothermia  CRP elevation 476  Procalcitonin elevation 70.70  Thrombocytopenia  E. coli septicemia likely from pyelonephritis-CT abdomen no obstruction  Underlying decompensated liver cirrhosis with MONROY and esophageal varices grade 3 post banding in the past  Transaminitis  - shock liver vs from rhabdomyolysis  New onset A-fib with RVR  Diabetes with DKA and metabolic acidosis high Anion gap  Rhabdomyolysis  JUAN ANTONIO  Diverticulosis but no diverticulitis    Other:  Prolonged QTc 554 - defer quinolones  or macrolides  Discussion / summary of stay / plan of care/ Recommendations:     HENCE:   E coli blood and urine -not ESBL and R levaquin  ceftriaxone 2 g daily till 11/10   WBC better 13 - creat better 1.5   multiorgan failure -improving  encephalopathy,  But still not following commands -CT head neg  Disc w  fam on bedside and RN      Infection Control Recommendations   [place] Precautions      Antimicrobial Stewardship Recommendations   Simplification of therapy  Targeted therapy      History of Present Illness:   Initial history:  Claudia Malone is a 79 y.o.-year-old female comes to the ER because of lack of response found at home by family member.  Unclear for how long, possibly days, she was laying in the kitchen moaning after vomiting and having stools, EMS was called she was intubated for airway protection, she was hypotensive started on pressors.  She was also hypothermic, core temperature 28, started on a warmer, creatinine was up to 2.3 urine analysis infective and blood cultures came back with E. coli x 2.  Her CRP was 476 
          [facility] -   Infectious diseases evaluation  admission date 10/28/2024    reason for consultation:   Acute septic shock with multiorgan failure    Impression :   Current:  Severe SIRS with septic shock from pyelonephritis with multiorgan failure/JUAN ANTONIO respiratory failure acute mental status changes  Acute encephalopathy related to the UTI  hypothermia  CRP elevation 476  Procalcitonin elevation 70.70  Thrombocytopenia  E. 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,      Other:  Prolonged QTc 554 - defer quinolones  or macrolides  Discussion / summary of stay / plan of care/ Recommendations:     HENCE:   Treating E coli blood and urine -not ESBL and R levaquin  Found down at home and encephalopathy did not full resolve  11/7 MRI suggested an acute left frontal infarct  ceftriaxone 2 g daily till 11/10  completed  WBC better 13 - 7.7 - creat better 1.5 -0.9  multiorgan failure -improving - extubated 11/4  Disc w  fam on bedside and RN      Infection Control Recommendations   [place] Precautions      Antimicrobial Stewardship Recommendations   Simplification of therapy  Targeted therapy      History of Present Illness:   Initial history:  Claudia Malone is a 79 y.o.-year-old female comes to the ER because of lack of response found at home by family member.  Unclear for how long, possibly days, she was laying in the kitchen moaning after vomiting and having stools, EMS was called she was intubated for airway protection, she was hypotensive started on pressors.  She was also hypothermic, core 
          [facility] -   Infectious diseases evaluation  admission date 10/28/2024    reason for consultation:   Acute septic shock with multiorgan failure    Impression :   Current:  Severe SIRS with septic shock from pyelonephritis with multiorgan failure/JUAN ANTONIO respiratory failure acute mental status changes  Acute encephalopathy related to the UTI  hypothermia  CRP elevation 476  Procalcitonin elevation 70.70  Thrombocytopenia  E. coli septicemia likely from pyelonephritis-CT abdomen no obstruction  Underlying decompensated liver cirrhosis with OMNROY and esophageal varices grade 3 post banding in the past  Transaminitis  - shock liver vs from rhabdomyolysis  New onset A-fib with RVR  Diabetes with DKA and metabolic acidosis high Anion gap  Rhabdomyolysis  JUAN ANTONIO  Diverticulosis but no diverticulitis    Other:  Prolonged QTc 554 - defer quinolones  or macrolides  Discussion / summary of stay / plan of care/ Recommendations:     HENCE:   E coli blood and urine pend sensi, not ESBL per lab / biofire   ceftriaxone 2 g daily and WBC better  multiorgan failure -improving  encephalopathy? But still not following commands      DISCUSSED W RN  on bedside  Infection Control Recommendations   [place] Precautions      Antimicrobial Stewardship Recommendations   Simplification of therapy  Targeted therapy      History of Present Illness:   Initial history:  Claudia Malone is a 79 y.o.-year-old female comes to the ER because of lack of response found at home by family member.  Unclear for how long, possibly days, she was laying in the kitchen moaning after vomiting and having stools, EMS was called she was intubated for airway protection, she was hypotensive started on pressors.  She was also hypothermic, core temperature 28, started on a warmer, creatinine was up to 2.3 urine analysis infective and blood cultures came back with E. coli x 2.  Her CRP was 476 CT scan of the chest abdomen and 
          [facility] -   Infectious diseases evaluation  admission date 10/28/2024    reason for consultation:   Acute septic shock with multiorgan failure    Impression :   Current:  Severe SIRS with septic shock from pyelonephritis with multiorgan failure/JUAN ANTONIO respiratory failure acute mental status changes  Acute encephalopathy related to the UTI  hypothermia  CRP elevation 476  Procalcitonin elevation 70.70  Thrombocytopenia  E. 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,      Other:  Prolonged QTc 554 - defer quinolones  or macrolides  Discussion / summary of stay / plan of care/ Recommendations:     HENCE:   Treating E coli blood and urine -not ESBL and R levaquin  Found down at home and encephalopathy did not full resolve  11/7 MRI suggested an acute left frontal infarct  ceftriaxone 2 g daily till 11/10  completed  WBC better 13 - creat better 1.5   multiorgan failure -improving - extubated 11/4  Disc w  fam on bedside and RN      Infection Control Recommendations   [place] Precautions      Antimicrobial Stewardship Recommendations   Simplification of therapy  Targeted therapy      History of Present Illness:   Initial history:  Claudia Malone is a 79 y.o.-year-old female comes to the ER because of lack of response found at home by family member.  Unclear for how long, possibly days, she was laying in the kitchen moaning after vomiting and having stools, EMS was called she was intubated for airway protection, she was hypotensive started on pressors.  She was also hypothermic, core 
          [facility] -   Infectious diseases evaluation  admission date 10/28/2024    reason for consultation:   Acute septic shock with multiorgan failure    Impression :   Current:  Severe SIRS with septic shock from pyelonephritis with multiorgan failure/JUAN ANTONIO respiratory failure acute mental status changes  Acute encephalopathy related to the UTI  hypothermia  CRP elevation 476  Procalcitonin elevation 70.70  Thrombocytopenia  E. coli septicemia likely from pyelonephritis-CT abdomen no obstruction  Underlying decompensated liver cirrhosis with MONROY and esophageal varices grade 3 post banding in the past  Transaminitis  - shock liver vs from rhabdomyolysis  New onset A-fib with RVR  Diabetes with DKA and metabolic acidosis high Anion gap  Rhabdomyolysis  JUAN ANTONIO  Diverticulosis but no diverticulitis    Other:  Prolonged QTc 554 - defer quinolones  or macrolides  Discussion / summary of stay / plan of care/ Recommendations:     HENCE:   E coli blood and urine -not ESBL and R levaquin  ceftriaxone 2 g daily till 11/10   WBC better 13 - creat better 1.5   multiorgan failure -improving  encephalopathy,  following commands -CT head neg - extubated 11/4  Disc w  fam on bedside and RN      Infection Control Recommendations   [place] Precautions      Antimicrobial Stewardship Recommendations   Simplification of therapy  Targeted therapy      History of Present Illness:   Initial history:  Claudia Malone is a 79 y.o.-year-old female comes to the ER because of lack of response found at home by family member.  Unclear for how long, possibly days, she was laying in the kitchen moaning after vomiting and having stools, EMS was called she was intubated for airway protection, she was hypotensive started on pressors.  She was also hypothermic, core temperature 28, started on a warmer, creatinine was up to 2.3 urine analysis infective and blood cultures came back with E. coli x 2.  Her CRP was 
          Pharmacy Note     Renal Dose Adjustment    Claudia Malone is a 79 y.o. female. Pharmacist assessment of renally cleared medications.    Recent Labs     10/28/24  1758   *       Recent Labs     10/28/24  1742 10/28/24  1758   CREATININE 2.2* 2.3*       Estimated Creatinine Clearance: 21 mL/min (A) (based on SCr of 2.3 mg/dL (H)).  Estimated CrCl using Ideal Body Weight: 17.5 mL/min (based on IBW 54.7 kg)    Height:   Ht Readings from Last 1 Encounters:   10/28/24 1.626 m (5' 4\")     Weight:  Wt Readings from Last 1 Encounters:   10/28/24 86.2 kg (190 lb)       The following medication dose has been adjusted based upon renal function per P&T Guidelines:             Zosyn 3375 mg every 8 hours changed to zosyn 3375 mg every 12 hours over 4 hours.      
     Nutrition Note    Discussed plans for start of nutrition with RN.  Noted RD was consulted for Tube Feedings and then consult was cancelled.  Pt remains intubated, sedated, and on pressors.      If Tube Feedings to be started, suggest Renal (Nepro with Carb Steady) formula goal 30 mL/hr + 2 Protein modulars daily.  Monitor TF tolerance and plan of care.    For additional information, refer to full RD assessment from 10/29/24.    Electronically signed by Milady Bautista RD, LD on 10/30/24 at 12:00 PM EDT    Contact: [phone]  
    [facility]  Internal Medicine Teaching Residency Program  Inpatient Daily Progress Note  ______________________________________________________________________________    Patient: Claudia Malone  YOB: 1945   MRN:[id number]    Acct: [id number]     Room: 2015/2015-01  Admit date: 10/28/2024  Today's date: 11/12/24  Number of days in the hospital: 15    SUBJECTIVE   Admitting Diagnosis: Sepsis (HCC)  CC: Brought in by EMS for altered mental status    -No acute events overnight.  -Patient maintains oxygen saturation at room air.  -Patient is hemodynamically stable.  -Patient scan was evaluated by ENT. Since patient is a symptomatic, and is not concerned with findings on imaging as of now. Patient is on ciprodex ear drops  -patient completed ceftriaxone course yesterday.  -No concerns as of now.  -Labs reviewed in the morning showed  BMP: Unremarkable  CBC: Unremarkable, platelets improving    -Blood culture positive for E. coli 10/30/2024  -Urine culture positive for E. coli 10/30/2024    MRI brain 11/7/2024   IMPRESSION:  3.5 mm restricted diffusion in the left frontal centrum semi ovale, likely  related to acute to subacute infarction.  Mild to moderate parenchymal volume loss.  Moderate chronic microvascular disease.  Moderate bilateral mastoid effusions.    CTA head and neck 11/8/2024  IMPRESSION:  1. Fibrocalcific plaque involving the right proximal cervical ICA resulting  in 70-75% stenosis per NASCET criteria.  2. Moderate narrowing of the left V1 origin which arises from the aortic arch.  3. Severe focal high-grade stenosis of the left mid P2 segment.  4. Moderate narrowing of the right paraclinoid ICA.    Plan  -Outpatient follow-up for liver cirrhosis  -Neurosurgery consulted for her MRI finding and altered mental status, neurosurgery wants to observe for now no active intervention during this admission but recommended extensive physical 
    [facility]  Internal Medicine Teaching Residency Program  Inpatient Daily Progress Note  ______________________________________________________________________________    Patient: Claudia Malone  YOB: 1945   MRN:[id number]    Acct: [id number]     Room: 2015/2015-01  Admit date: 10/28/2024  Today's date: 11/06/24  Number of days in the hospital: 9    SUBJECTIVE   Admitting Diagnosis: Septic shock (HCC)  CC: Brought in by EMS for altered mental status  Pt examined at bedside. Chart & results reviewed.   Patient is eating ok, sleeping ok, normal bowel/ bladder movements.  Patient is able to ambulate  This patient is stable vital wise blood pressure slightly on the higher side 152/77, afebrile,, saturation over 94% in 3 L oxygen    Morning labs WBC 12.3, hemoglobin stable 10.7, platelets 136, BMP pending, blood glucose 136  -Urine output 117 0 mL in 24 hours    Plan  -Continue ceftriaxone for E colitis septic shock/pyelonephritis as per ID  -Taper the dose of steroid which was started for septic shock  -PT OT evaluation  -Encourage oral fluid intake  -Cannot be started on anticoagulation for new onset A-fib with due to thrombocytopenia    Review of Systems   Constitutional:  Negative for activity change, appetite change, chills, diaphoresis, fatigue, fever and unexpected weight change.   Respiratory:  Negative for cough, choking, chest tightness, shortness of breath, wheezing and stridor.    Cardiovascular:  Negative for chest pain, palpitations and leg swelling.   Gastrointestinal:  Negative for abdominal distention, abdominal pain, constipation, nausea and vomiting.   Genitourinary:  Negative for dysuria, frequency, hematuria, urgency and vaginal pain.   Musculoskeletal:  Negative for arthralgias, back pain, joint swelling and myalgias.   Neurological:  Negative for dizziness, tremors, seizures, weakness and headaches.   Hematological:  Negative for 
   10/28/24 1745   Ventilator Settings   FiO2  (S)  100 %   Resp Rate (Set) (S)  20 bpm   Target Vt (S)  450   PEEP/CPAP (cmH2O) (S)  8     Patient arrived to room and placed on above settings.   ETT in place, 24cm @ lip. Bilateral breath sounds noted, adequate tidal volumes, and EtCO2 20.   
   11/02/24 0824   Vent Information   Vent Mode (S)  CPAP/PS   Ventilator Settings   FiO2  (S)  40 %   PEEP/CPAP (cmH2O) (S)  5   Pressure Support (cm H2O) (S)  6 cm H2O       
  Critical care team - Resident sign-out to medicine service      Date and time: 11/5/2024 3:58 PM  Patient's name:  Claudia Malone  Medical Record Number: [id number]  Patient's account/billing number: [id number]  Patient's YOB: 1945  Age: 79 y.o.  Date of Admission: 10/28/2024  5:38 PM  Length of stay during current admission: 8    Primary Care Physician: Aime Barrett MD    Code Status: Full Code    Mode of physician to physician communication:        [x] Via telephone   [] In person     Date and time of sign-out: 11/5/2024 3:58 PM    Accepting Internal Medicine resident: Dr. Barry Whiting    Accepting Medicine team: IM Team MED 2    Accepting team's attending: Dr. Giles    Patient's current ICU Bed:  3001    Patient's assigned bed on floor:  2015        [] Med-Surg Monitored [x] Step-down       [] Psychiatry ICU       [] Psych floor     Reason for ICU admission:     Septic shock    ICU course summary:     79 y.o female with a past medical history of hypertension, DM2, dyslipidemia, decompensated MASH cirrhosis with esophageal varices s/p banding presented to the ED after being found unresponsive at home. Admitted for septic shock secondary to E.coli bacteremia 2/2 pyelonephritis. Intubated and started on pressors. ID consulted and patient started on rocephin. Due to shock, she developed ATN, temporary requiring dialysis. She was also found to be in DKA and placed on insulin drip. HbA1c 11.9, bridged with subcutaneous insulin.  Due to sepsis, she developed new-onset A.fib with RVR and placed on amiodarone drip switched to lopressor. CHADS-VASc 5 but not a canididate for anticoagulation due to thrombocytopenia.     Procedures during patient's ICU stay:     Dialysis    Current Vitals:     BP (!) 170/70   Pulse 64   Temp 98.6 °F (37 °C)   Resp 20   Ht 1.626 m (5' 4.02\")   Wt 91.8 kg (202 lb 6.1 oz)   SpO2 95%   BMI 34.72 kg/m²       Cultures:     Blood cultures:                 [] None drawn   
  [facility]ist  [address]  [place] [address]  PH:  [phone] or [phone]  FAX:  [phone]            Brief history: Claudia Malone is a 79 y.o. old female admitted on 10/28/2024 after being found unresponsive at her home.      Subjective: No new neurological events overnight. Discussed case with NEV. Started on Eliquis. Stability HCT pending.      Objective: /61   Pulse 76   Temp 99 °F (37.2 °C) (Axillary)   Resp 19   Ht 1.626 m (5' 4.02\")   Wt 93.8 kg (206 lb 14.4 oz)   SpO2 95%   BMI 35.50 kg/m²       Medications:    aspirin  81 mg Oral Daily    apixaban  5 mg Oral BID    potassium chloride  40 mEq Oral Once    insulin glargine  15 Units SubCUTAneous Daily    melatonin  3 mg Oral Nightly    pantoprazole  40 mg Oral BID AC    spironolactone  50 mg Oral Daily    carvedilol  3.125 mg Oral BID WC    [START ON 11/10/2024] predniSONE  5 mg Oral Daily    latanoprost  1 drop Both Eyes Nightly    insulin lispro  0-16 Units SubCUTAneous Q6H    lactulose  10 g Orogastric TID    cefTRIAXone (ROCEPHIN) IV  2,000 mg IntraVENous Q24H    levothyroxine  12.5 mcg Orogastric Daily    sodium chloride flush  5-40 mL IntraVENous 2 times per day    insulin regular  1-20 Units IntraVENous Once          Physical Exam:   /61   Pulse 76   Temp 99 °F (37.2 °C) (Axillary)   Resp 19   Ht 1.626 m (5' 4.02\")   Wt 93.8 kg (206 lb 14.4 oz)   SpO2 95%   BMI 35.50 kg/m²   Temp (24hrs), Av.4 °F (36.9 °C), Min:98 °F (36.7 °C), Max:99 °F (37.2 °C)        Neurologic Exam        CONSTITUTIONAL:  Alert and oriented x 2.   Follows very simple commands     HEAD:  normocephalic, atraumatic    EYES:  PERRLA, EOMI. lateral endpoint nystagmus bilaterally   ENT:  moist mucous membranes   NECK:  supple, symmetric, no midline tenderness to palpation    BACK:  without midline tenderness, step-offs or deformities    LUNGS:  Equal air entry bilaterally   CARDIOVASCULAR:  normal s1 / 
  [facility] [place] [facility]     Emergency/Trauma Note    PATIENT NAME: Claudia Malone    Shift date: 10/28/2024   Shift day: Monday   Shift # 1    Room #    Name: Dr Bina Pompa            Age: 79 y.o.  Gender: female          Mosque: Sikhism   Place of Moravian:     Trauma/Incident type:  Pt found unresponsive at  home  Admit Date & Time: 10/28/2024  5:38 PM      NAME OF DECISION MAKER: Elif    RELATIONSHIP OF DECISION MAKER TO PATIENT: daughter, [phone]    PATIENT/EVENT DESCRIPTION:  Per brother, pt was found down at home when he did a well check.  Police had to kick door in to get to pt.  Pt was intubated on scene. Pt to be admitted to .         SPIRITUAL ASSESSMENT-INTERVENTION-OUTCOME:  Writer was notified by triage that pt was here and that family had arrived. Writer spoke with brother, To, in consultation room and confirmed name and . He appeared to be coping. Brother said that pt's daughter, Elif Anderson), is on her way from [place] and that she should have advance directive paperwork. Writer provided a ministry of presence as doctor updated brother.  Writer provided support through active listening and words of affirmation.      PATIENT BELONGINGS:  No belongings noted    ANY BELONGINGS OF SIGNIFICANT VALUE NOTED:      REGISTRATION STAFF NOTIFIED?  Yes      WHAT IS YOUR SPIRITUAL CARE PLAN FOR THIS PATIENT?:   Spiritual health team will remain available for spiritual and emotional support.     Electronically signed by Chaplain Jean-Paul, on 10/28/2024 at 6:57 PM.  [facility]  [phone]   
  [facility]     Department of Internal Medicine - Staff Internal Medicine Service   ICU PATIENT TRANSFER NOTE        Patient:  Claudia Malone  YOB: 1945  MRN: [id number]     Acct: [id number]     Admit date: 10/28/2024    Code Status:-  Full code     Reason for ICU Admission:-   Septic shock, intubated    SUPPORT DEVICES: [x] Ventilator [] BIPAP  [x] Nasal Cannula [] Room Air    Consultations:- [x] Cardiology [x] Nephrology  [] Hemo onco  [] GI                               [x] ID [] ENT  [] Rheum [] Endo   []Physiotherapy                                 Others:-     NUTRITION:  [] NPO [] Tube Feeding (Specify: ) [] TPN  [x] PO    Central Lines:- [x] No   [] Yes           If yes - Days/Date of Insertion.      Pt seen,examined and Chart reviewed.    ICU COURSE:    79 y.o female with a past medical history of hypertension, DM2, dyslipidemia, decompensated MASH cirrhosis with esophageal varices s/p banding presented to the ED after being found unresponsive at home. Admitted for septic shock secondary to E.coli bacteremia 2/2 pyelonephritis. Intubated and started on pressors. ID consulted and patient started on rocephin. Due to shock, she developed ATN, temporary requiring dialysis. She was also found to be in DKA and placed on insulin drip. HbA1c 11.9, bridged with subcutaneous insulin. Due to sepsis, she developed new-onset A.fib with RVR and placed on amiodarone drip switched to lopressor. CHADS-VASc 5 but not a canididate for anticoagulation due to thrombocytopenia.     In the ER -patient was brought to the emergency department by EMS after patient was found unresponsive at her home. Per family,  Last well-known was on Wednesday 10/30/2024 when the patient spoke to her neighbor.  On the day of admission patient's brother arrived at her home and found patient lying down on the kitchen, moaning, surrounded by emesis and feces- black tarry stools.  EMS was called and patient was 
  Today's Date: 11/12/2024  Patient Name: Claudia Malone  Date of admission: 10/28/2024  5:38 PM  Patient's age: 79 y.o., 1945  Admission Dx: Acute cystitis without hematuria [N30.00]  Hypothermia, initial encounter [T68.XXXA]  Septic shock (HCC) [A41.9, R65.21]  Sepsis, due to unspecified organism, unspecified whether acute organ dysfunction present (HCC) [A41.9]    Reason for Consult: management recommendations  Requesting Physician: No admitting provider for patient encounter.    CHIEF COMPLAINT:  thrombocytopenia     Interval history  Patient is seen and evaluated.  Complains of being cold specially in the feet.  Examination did not show any signs of ischemia.  Neurology and cardiology input appreciated.      HISTORY OF PRESENT ILLNESS:      The patient is a 79 y.o.   female who is admitted to the hospital for acute respiratory failure, atrial fibrillation, DKA.  He was intubated and was in ICU but then condition improved.  Workup showed E. coli bacteremia that was treated and she was transferred out of ICU.  Because of atrial fibrillation, there is an intention to start her on on anticoagulation but due to thrombocytopenia, we are consulted to evaluate her for candidacy for anticoagulation.  The patient is seen and evaluated.  She seems to be recovered.  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  As discussed, atrial fibrillation,DM    Past Surgical History:   has no past surgical history on file.     Medications:    Reviewed in Epic     Allergies:  Patient has no known allergies.    Social History:   reports that she has quit smoking. Her smoking use included cigarettes. She started smoking about 44 years ago. She has a 16 pack-year 
  Today's Date: 11/8/2024  Patient Name: Claudia Malone  Date of admission: 10/28/2024  5:38 PM  Patient's age: 79 y.o., 1945  Admission Dx: Acute cystitis without hematuria [N30.00]  Hypothermia, initial encounter [T68.XXXA]  Septic shock (HCC) [A41.9, R65.21]  Sepsis, due to unspecified organism, unspecified whether acute organ dysfunction present (HCC) [A41.9]    Reason for Consult: management recommendations  Requesting Physician: Edenilson Wang MD    CHIEF COMPLAINT:  thrombocytopenia     Interval history  Patient is seen and evaluated.  Complains of being cold specially in the feet.  Examination did not show any signs of ischemia.  Neurology and cardiology input appreciated.  Cardiologist stated that the patient has high CHADS2 score however, the patient is not on anticoagulation despite normal platelets    HISTORY OF PRESENT ILLNESS:      The patient is a 79 y.o.   female who is admitted to the hospital for acute respiratory failure, atrial fibrillation, DKA.  He was intubated and was in ICU but then condition improved.  Workup showed E. coli bacteremia that was treated and she was transferred out of ICU.  Because of atrial fibrillation, there is an intention to start her on on anticoagulation but due to thrombocytopenia, we are consulted to evaluate her for candidacy for anticoagulation.  The patient is seen and evaluated.  She seems to be recovered.  She was unable to give me much history and most information were obtained from her daughter who is at bedside  Past Medical History:   has no past medical history on file.  As discussed, atrial fibrillation,DM    Past Surgical History:   has no past surgical history on file.     Medications:    Reviewed in Epic     Allergies:  Patient has no known allergies.    Social History:   reports that she has quit smoking. Her smoking use included cigarettes. She started smoking about 44 years ago. She has a 16 pack-year smoking history. She has never used 
755:  Patient discharged with [facility] transport. Patient left by stretcher in hospital gown and transport. Both Iv's were removed and all questions were answered. VSS. No assessment completed at this time.   
Comprehensive Nutrition Assessment    Type and Reason for Visit:  Initial (vent check)    Nutrition Recommendations/Plan:   Continue NPO as medically necessary  Recommend nutrition support if NPO 5-7 days  If nutrition support is desired, recommend Renal TF (Nepro w/ Carb steady) @ 10 ml/hr and advance by 15 ml q6h to goal of 30 ml/hr + 2 PRO mod/day (total energy see below)  Will monitor labs, wt, GI status, and POC     Malnutrition Assessment:  Malnutrition Status:  Insufficient data (10/29/24 1156)      Nutrition Assessment:    79 y.o.F admitted d/t AMS, found down at home. Pt is intubated/sedated,pressors. No enteral access at this time. RD to provide EN recommendations in the event that nutrition support is desired. Recommend Renal TF @ 10 ml/hr and adv by 15 ml q6h to goal rate of 30 ml/hr + 2 PRO mod/day. This provides 1456 kcals, 98 g pro. Per chart, no wt hx to assess. Meds: vit K, vaso, radha, levo, insulin dtt, vit K. Labs: phos 5.3, -224 mg/dL.    Nutrition Related Findings:    Generalized trace edema Wound Type: None       Current Nutrition Intake & Therapies:    Average Meal Intake: NPO     Diet NPO    Anthropometric Measures:  Height: 162.6 cm (5' 4.02\")  Ideal Body Weight (IBW): 120 lbs (55 kg)    Current Body Weight: 86.2 kg (190 lb 0.6 oz), 158.4 % IBW.    Current BMI (kg/m2): 32.6  BMI Categories: Obese Class 1 (BMI 30.0-34.9)    Estimated Daily Nutrient Needs:  Energy Requirements Based On: Kcal/kg (critical care guidelines)  Weight Used for Energy Requirements: Current  Energy (kcal/day): 948-1204 kcals/day  Weight Used for Protein Requirements: Ideal  Protein (g/day): 100-110 g/day  Method Used for Fluid Requirements: Other (Comment)  Fluid (ml/day): per MD    Nutrition Diagnosis:   Inadequate oral intake related to inadequate protein-energy intake as evidenced by NPO or clear liquid status due to medical condition, intubation    Nutrition Interventions:   Food and/or Nutrient Delivery: 
Comprehensive Nutrition Assessment    Type and Reason for Visit:  Reassess    Nutrition Recommendations/Plan:   Continue NPO.  Monitor plans for start of nutrition.  Consider starting Tube Feedings as pt has been NPO x 4 days.  If Tube Feedings to be start, suggest Renal (Nepro with Carb Steady) formula goal 30 mL/hr + 2 Protein modulars.  Or Diabetic (Glucerna 1.5) goal 45 mL/hr.     Malnutrition Assessment:  Malnutrition Status:  Insufficient data (11/01/24 1312)    Context:  Acute Illness     Findings of the 6 clinical characteristics of malnutrition:  Energy Intake:  75% or less of estimated energy requirements for 7 or more days  Weight Loss:  Unable to assess     Body Fat Loss:  Unable to assess   Muscle Mass Loss:  Unable to assess  Fluid Accumulation:  Mild Extremities   Strength:  Not Performed    Nutrition Assessment:    Pt remains intubated, sedated, and on pressor.  Pt remains NPO.  Will provide nutrition support recommendations.  Dialysis continues.  Weights reviewed - fluctuations noted - ? due to fluids.  Labs reviewed: K 3.3 mmol/L, BUN 56 mg/dL, CR 1.5 mg/dL, Glucose 179-207 mg/dL.  Meds include: Solu-Cortef, Lantus, Humalog SS, Lactulose, Synthroid, Protonix.    Nutrition Related Findings:    +1 non-pitting generalized/extremity edema. Wound Type: None       Current Nutrition Intake & Therapies:    Average Meal Intake: NPO  Average Supplements Intake: NPO  Diet NPO  Additional Calorie Sources:  None    Anthropometric Measures:  Height: 162.6 cm (5' 4.02\")  Ideal Body Weight (IBW): 120 lbs (55 kg)    Admission Body Weight: 86.2 kg (190 lb 0.6 oz)  Current Body Weight: 93.1 kg (205 lb 4 oz), 171 % IBW. Weight Source: Bed scale  Current BMI (kg/m2): 35.2           Weight Adjustment For: No Adjustment                 BMI Categories: Obese Class 2 (BMI 35.0 -39.9)    Estimated Daily Nutrient Needs:  Energy Requirements Based On: Kcal/kg  Weight Used for Energy Requirements: Ideal  Energy (kcal/day): 
Comprehensive Nutrition Assessment    Type and Reason for Visit:  Reassess    Nutrition Recommendations/Plan:   Continue current diet.  Start high ursula/high pro ONS TID (no vanilla).  Encourage intakes.  Will monitor labs, weights, intake, GI status, and plan of care.     Malnutrition Assessment:  Malnutrition Status:  At risk for malnutrition (11/04/24 1037)    Context:  Acute Illness     Findings of the 6 clinical characteristics of malnutrition:  Energy Intake:  Mild decrease in energy intake  Weight Loss:  Unable to assess (Weight fluctuations noted.)     Body Fat Loss:  Mild body fat loss Orbital   Muscle Mass Loss:  Unable to assess    Fluid Accumulation:  Mild Generalized, Extremities   Strength:  Not Performed    Nutrition Assessment:    Pt extubated on 11/4, TF discontinued 11/5 and PO started. Per RN and family pt is not eating well, will take a few bites at best. Daughter states pt does drink well and thinks pt will drink ONS if provided, pt states she prefers strawberry or chocolate. Will continue to follow for appetite improvement. Noted 14# wt gain x8 days, possibly fluid related.    Nutrition Related Findings:    Edema: +1 non pitting generalized/BLE, +1 pitting BUE. LBM 11/7. Labs: K 3.5, Glu 126, Ca 7.3. Meds: lantus, humalog Wound Type: Multiple       Current Nutrition Intake & Therapies:    Average Meal Intake: 1-25%  Average Supplements Intake: None Ordered  ADULT DIET; Dysphagia - Soft and Bite Sized    Anthropometric Measures:  Height: 162.6 cm (5' 4.02\")  Ideal Body Weight (IBW): 120 lbs (55 kg)    Admission Body Weight: 86.2 kg (190 lb 0.6 oz)  Current Body Weight: 93.8 kg (206 lb 12.7 oz), 169.8 % IBW. Weight Source: Bed scale  Current BMI (kg/m2): 35.5     Weight Adjustment For: No Adjustment    BMI Categories: Obese Class 1 (BMI 30.0-34.9)    Estimated Daily Nutrient Needs:  Energy Requirements Based On: Formula  Weight Used for Energy Requirements: Current  Energy (kcal/day): 0311-2037 
Critical Care Team - Daily Progress Note      Date and time: 10/29/2024 7:19 AM  Patient's name:  Claudia Malone  Medical Record Number: [id number]  Patient's account/billing number: [id number]  Patient's YOB: 1945  Age: 79 y.o.  Date of Admission: 10/28/2024  5:38 PM  Length of stay during current admission: 1      Primary Care Physician: Aime Barrett MD  ICU Attending Physician:      Code Status: Full Code    Reason for ICU admission:   Chief Complaint   Patient presents with    Altered Mental Status     HISTORY OF PRESENT ILLNESS:       History was obtained from chart review and family.       Claudia Malone is a 79 y.o. 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 Per family,  Last well-known was on Wednesday when the patient spoke to her neighbor.  Today patient's brother arrived at her home and found patient lying down on the kitchen, moaning, surrounded by emesis and feces- black tarry stools.  EMS was called and patient was intubated for airway protection, was hypotensive and started on epi infusion and was transferred to emergency department.     Upon arrival to ED, patient was found to be's hypothermic with core temperature 28.3 C.  Placed on warmer.  Patient initially had feeble thready pulses. patient was hypotensive and was started on Levophed.  Pertinent labs at admission JUAN ANTONIO with , creatinine 2.3.  Lactate 3.  Blood glucose 263.  Elevated CK15 64, elevated myoglobin 9165.  .  Beta hydroxybutyrate 6.15.  Urinalysis infective.  CTA chest abdomen pelvis was done which was negative for pulmonary embolism, acute abnormality.     On my examination, patient was intubated and sedated.  On Levophed and epinephrine infusions.  Not following commands.  Hypothermic.     Upon further 
Critical Care Team - Daily Progress Note      Date and time: 10/30/2024 7:49 AM  Patient's name:  Claudia Malone  Medical Record Number: [id number]  Patient's account/billing number: [id number]  Patient's YOB: 1945  Age: 79 y.o.  Date of Admission: 10/28/2024  5:38 PM  Length of stay during current admission: 2      Primary Care Physician: Aime Barrett MD  ICU Attending Physician: Dr. Chito MD    Code Status: Full Code    Reason for ICU admission:   Chief Complaint   Patient presents with    Altered Mental Status     HISTORY OF PRESENT ILLNESS:       History was obtained from chart review and family.       Claudia Malone is a 79 y.o. 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 Per family,  Last well-known was on Wednesday when the patient spoke to her neighbor.  Today patient's brother arrived at her home and found patient lying down on the kitchen, moaning, surrounded by emesis and feces- black tarry stools.  EMS was called and patient was intubated for airway protection, was hypotensive and started on epi infusion and was transferred to emergency department.     Upon arrival to ED, patient was found to be's hypothermic with core temperature 28.3 C.  Placed on warmer.  Patient initially had feeble thready pulses. patient was hypotensive and was started on Levophed.  Pertinent labs at admission JUAN ANTONIO with , creatinine 2.3.  Lactate 3.  Blood glucose 263.  Elevated CK15 64, elevated myoglobin 9165.  .  Beta hydroxybutyrate 6.15.  Urinalysis infective.  CTA chest abdomen pelvis was done which was negative for pulmonary embolism, acute abnormality.     On my examination, patient was intubated and sedated.  On Levophed and epinephrine infusions.  Not following commands.  Hypothermic.     Upon 
Dialysis Post Treatment Note  Patient tolerated treatment well. Denies complaints at time of discharge.   Vitals:    10/31/24 1349   BP: 118/60   Pulse: 52   Resp: 17   Temp: 98.1 °F (36.7 °C)   SpO2: 97%     Pre-Weight = 89.9 kg  Post-weight = Weight - Scale: 91.6 kg (201 lb 15.1 oz)  Total Liters Processed = Blood Volume Processed (Liters): 65.9 L  Rinseback Volume (mL) = Rinseback Volume (ml): 300 ml  Net Removal (mL) = 500 ml  Length of treatment=3 hrs  Access:  left temporary HD catheter    Dialysis was completed. The pts BP dropped within 20 min of starting dialysis. The goal was decreased and the pt was given 50ml NS bolus with no change in BP. The pt was started on Livan IV and Levo IV for BP support by her GERMAN Hurst RN. The pts BP was better. However, it was noted with an increase in fluid removal, BP dropped. BP stable post blood return. Report was given to the pts GERMAN Hurst. RN. The pt is to remain in ICU at this time.   
Dialysis Post Treatment Note  Vitals:    10/30/24 1330   BP: (!) 160/74   Pulse: 63   Resp: 18   Temp: 98.8 °F (37.1 °C)   SpO2: 97%     Pre-Weight = 87.3kg  Post-weight = Weight - Scale: 87.3 kg (192 lb 7.4 oz)  Total Liters Processed = Blood Volume Processed (Liters): 43.6 L  Rinseback Volume (mL) = Rinseback Volume (ml): 300 ml  Net Removal (mL) =  0  Patient's dry weight=  Type of access used= Left Temp IJ  Length of treatment=2.5 hours    Pt tolerated tx well, no s/s of acute distress noted during HD, Temp IJ dialysis catheter ran good in reverse position, No Fluid removed, (See MAR for medications given for bp support by Primary RN), post tx report given to Primary RN Herminia LARES    
Dialysis Time Out  To be done by RN and tech or 2 RNs  Staff Names Aquiles ROLON RN and Sabi PIPER RN    [x]  Identity of the patient using 2 patient identifiers  [x]  Consent for treatment  [x]  Equipment-proper machine and dialyzer  [x]  B-Hep B status (Antigen Negative 10/29/24)  [x]  Orders- to include bath, blood flow, dialyzer, time and fluid removal  [x]  Access-Correct site and in working order  [x]  Time for patient to ask questions.    
Dialysis Time Out  To be done by RN and tech or 2 RNs  Staff Names Pham MART RN, Natali GONZALES RN    [x]  Identity of the patient using 2 patient identifiers  [x]  Consent for treatment  [x]  Equipment-proper machine and dialyzer  [x]  B-Hep B status HbSAg 10/29/24 Neg  [x]  Orders- to include bath, blood flow, dialyzer, time and fluid removal  [x]  Access-Correct site and in working order  [x]  Time for patient to ask questions.   
[facility]   Progress Note                   Date:   10/31/2024  Patient name: Claudia Malone  Date of admission:  10/28/2024  5:38 PM  MRN:   [id number]  YOB: 1945  PCP: Aime Barrett MD    Reason for Admission:  Altered mental status    Subjective:       There were no acute events overnight, remained hemodynamically stable, denies chest pain, dyspnea, orthopnea or palpitations.    Continues to be on norepinephrine gtt. Platelets today 23. On mechanical ventilation    Afib under reate control    Brief HPI:    79 y.o.  female who is admitted to the hospital for altered mental status.  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  She was last well-known on Wednesday.  EMS was called and she was intubated at scene for airway protection.  She was also noted to be hypotensive and was started on pressor support.  In the ED, she was hypothermic.  Labs revealed troponin 45, elevated CK and myoglobin consistent with rhabdomyolysis, JUAN ANTONIO with creatinine 2.3-2 0.9-2.8.  Thrombocytopenia.  CT head was unremarkable, CT chest revealed no concern for aortic aneurysm/dissection, mild cardiomegaly with LVH.  She went into atrial flutter followed by atrial fibrillation with RVR and cardiology was consulted for further recommendations.    Medications:   Scheduled Meds:   midodrine  10 mg Oral TID WC    insulin lispro  0-16 Units SubCUTAneous Q6H    insulin glargine  20 Units SubCUTAneous Daily    lactulose  10 g Orogastric TID    cefTRIAXone (ROCEPHIN) IV  2,000 mg IntraVENous Q24H    hydrocortisone sodium succinate PF  100 mg IntraVENous Q8H    phytonadione  10 mg Orogastric Daily    levothyroxine  12.5 mcg Orogastric Daily    sodium chloride flush  5-40 mL IntraVENous 2 times per day    [Held by provider] heparin 
[facility]   Progress Note                   Date:   11/1/2024  Patient name: Claudia Malone  Date of admission:  10/28/2024  5:38 PM  MRN:   [id number]  YOB: 1945  PCP: Aime Barrett MD    Reason for Admission:  Altered mental status    Subjective:       There were no acute events overnight, remained hemodynamically stable. On mechanical ventilation.  In sinus rhythm with no further episodes of A-fib with RVR.  Continues to be on Amio at 0.5.    Brief HPI:    79 y.o.  female who is admitted to the hospital for altered mental status.  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  She was last well-known on Wednesday.  EMS was called and she was intubated at scene for airway protection.  She was also noted to be hypotensive and was started on pressor support.  In the ED, she was hypothermic.  Labs revealed troponin 45, elevated CK and myoglobin consistent with rhabdomyolysis, JUAN ANTONIO with creatinine 2.3-2 0.9-2.8.  Thrombocytopenia.  CT head was unremarkable, CT chest revealed no concern for aortic aneurysm/dissection, mild cardiomegaly with LVH.  She went into atrial flutter followed by atrial fibrillation with RVR and cardiology was consulted for further recommendations.    Medications:   Scheduled Meds:   bumetanide  2 mg IntraVENous BID    midodrine  10 mg Oral TID WC    insulin lispro  0-16 Units SubCUTAneous Q6H    insulin glargine  20 Units SubCUTAneous Daily    lactulose  10 g Orogastric TID    cefTRIAXone (ROCEPHIN) IV  2,000 mg IntraVENous Q24H    hydrocortisone sodium succinate PF  100 mg IntraVENous Q8H    levothyroxine  12.5 mcg Orogastric Daily    sodium chloride flush  5-40 mL IntraVENous 2 times per day    [Held by provider] heparin (porcine)  5,000 Units SubCUTAneous 3 times per day    
[facility]   Progress Note                   Date:   11/11/2024  Patient name: Claudia Malone  Date of admission:  10/28/2024  5:38 PM  MRN:   [id number]  YOB: 1945  PCP: Aime Barrett MD    Reason for Admission:  Altered mental status    Subjective:   Seen & examined in room. No acute CV issues/concerns overnight. Labs, vitals, & tele reviewed. Remains SR with rare PVCs. Pt resting in chair.  Family in room      Brief HPI:  79 y.o.  female who is admitted to the hospital for altered mental status.  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  She was last well-known on Wednesday.  EMS was called and she was intubated at scene for airway protection.  She was also noted to be hypotensive and was started on pressor support.  In the ED, she was hypothermic.  Labs revealed troponin 45, elevated CK and myoglobin consistent with rhabdomyolysis, JUAN ANTONIO with creatinine 2.3-2 0.9-2.8.  Thrombocytopenia.  CT head was unremarkable, CT chest revealed no concern for aortic aneurysm/dissection, mild cardiomegaly with LVH.  She went into atrial flutter followed by atrial fibrillation with RVR and cardiology was consulted for further recommendations.    Medications:   Scheduled Meds:   polyethylene glycol  17 g Oral Once    ciprofloxacin  4 drop Both Ears BID    And    dexAMETHasone  4 drop Both Ears BID    aspirin  81 mg Oral Daily    apixaban  5 mg Oral BID    potassium chloride  40 mEq Oral Once    insulin glargine  15 Units SubCUTAneous Daily    melatonin  3 mg Oral Nightly    pantoprazole  40 mg Oral BID AC    spironolactone  50 mg Oral Daily    carvedilol  3.125 mg Oral BID WC    latanoprost  1 drop Both Eyes Nightly    insulin lispro  0-16 Units SubCUTAneous Q6H    lactulose  10 g Orogastric TID    levothyroxine  
[facility]   Progress Note                   Date:   11/2/2024  Patient name: Claudia Malone  Date of admission:  10/28/2024  5:38 PM  MRN:   [id number]  YOB: 1945  PCP: Aime Barrett MD    Reason for Admission:  Altered mental status    Subjective:   Patient seen and examined at the bedside: No acute issues overnight, currently intubated, on IV amiodarone, off of the pressors since 11 pm.  Potassium 3.3 from yesterday, follow-up on electrolytes from today.    Brief HPI:    79 y.o.  female who is admitted to the hospital for altered mental status.  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  She was last well-known on Wednesday.  EMS was called and she was intubated at scene for airway protection.  She was also noted to be hypotensive and was started on pressor support.  In the ED, she was hypothermic.  Labs revealed troponin 45, elevated CK and myoglobin consistent with rhabdomyolysis, JUAN ANTONIO with creatinine 2.3-2 0.9-2.8.  Thrombocytopenia.  CT head was unremarkable, CT chest revealed no concern for aortic aneurysm/dissection, mild cardiomegaly with LVH.  She went into atrial flutter followed by atrial fibrillation with RVR and cardiology was consulted for further recommendations.    Medications:   Scheduled Meds:   midodrine  10 mg Oral TID WC    insulin lispro  0-16 Units SubCUTAneous Q6H    insulin glargine  20 Units SubCUTAneous Daily    lactulose  10 g Orogastric TID    cefTRIAXone (ROCEPHIN) IV  2,000 mg IntraVENous Q24H    hydrocortisone sodium succinate PF  100 mg IntraVENous Q8H    levothyroxine  12.5 mcg Orogastric Daily    sodium chloride flush  5-40 mL IntraVENous 2 times per day    [Held by provider] heparin (porcine)  5,000 Units SubCUTAneous 3 times per day    pantoprazole (PROTONIX) 40 
[facility]   Progress Note                   Date:   11/3/2024  Patient name: Claudia Malone  Date of admission:  10/28/2024  5:38 PM  MRN:   [id number]  YOB: 1945  PCP: Aime Barrett MD    Reason for Admission:  Altered mental status    Subjective:   Patient seen and examined at the bedside: No acute issues overnight, currently intubated, NSR 63.    Brief HPI:    79 y.o.  female who is admitted to the hospital for altered mental status.  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  She was last well-known on Wednesday.  EMS was called and she was intubated at scene for airway protection.  She was also noted to be hypotensive and was started on pressor support.  In the ED, she was hypothermic.  Labs revealed troponin 45, elevated CK and myoglobin consistent with rhabdomyolysis, JUAN ANTONIO with creatinine 2.3-2 0.9-2.8.  Thrombocytopenia.  CT head was unremarkable, CT chest revealed no concern for aortic aneurysm/dissection, mild cardiomegaly with LVH.  She went into atrial flutter followed by atrial fibrillation with RVR and cardiology was consulted for further recommendations.    Medications:   Scheduled Meds:   insulin glargine  25 Units SubCUTAneous Daily    spironolactone  25 mg Oral Daily    midodrine  10 mg Oral TID WC    insulin lispro  0-16 Units SubCUTAneous Q6H    lactulose  10 g Orogastric TID    cefTRIAXone (ROCEPHIN) IV  2,000 mg IntraVENous Q24H    hydrocortisone sodium succinate PF  100 mg IntraVENous Q8H    levothyroxine  12.5 mcg Orogastric Daily    sodium chloride flush  5-40 mL IntraVENous 2 times per day    [Held by provider] heparin (porcine)  5,000 Units SubCUTAneous 3 times per day    pantoprazole (PROTONIX) 40 mg in sodium chloride (PF) 0.9 % 10 mL injection  40 mg IntraVENous Q12H    
[facility]   Progress Note                   Date:   11/4/2024  Patient name: Claudia Malone  Date of admission:  10/28/2024  5:38 PM  MRN:   [id number]  YOB: 1945  PCP: Aime Barrett MD    Reason for Admission:  Altered mental status    Subjective:   Patient seen and examined at the bedside: No acute issues overnight, currently intubated, NSR 63.      Brief HPI:  79 y.o.  female who is admitted to the hospital for altered mental status.  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  She was last well-known on Wednesday.  EMS was called and she was intubated at scene for airway protection.  She was also noted to be hypotensive and was started on pressor support.  In the ED, she was hypothermic.  Labs revealed troponin 45, elevated CK and myoglobin consistent with rhabdomyolysis, JUAN ANTONIO with creatinine 2.3-2 0.9-2.8.  Thrombocytopenia.  CT head was unremarkable, CT chest revealed no concern for aortic aneurysm/dissection, mild cardiomegaly with LVH.  She went into atrial flutter followed by atrial fibrillation with RVR and cardiology was consulted for further recommendations.    Medications:   Scheduled Meds:   chlorothiazide  500 mg IntraVENous BID    latanoprost  1 drop Both Eyes Nightly    insulin glargine  30 Units SubCUTAneous Daily    metoprolol tartrate  12.5 mg Oral BID    spironolactone  25 mg Oral Daily    midodrine  10 mg Oral TID WC    insulin lispro  0-16 Units SubCUTAneous Q6H    lactulose  10 g Orogastric TID    cefTRIAXone (ROCEPHIN) IV  2,000 mg IntraVENous Q24H    hydrocortisone sodium succinate PF  100 mg IntraVENous Q8H    levothyroxine  12.5 mcg Orogastric Daily    sodium chloride flush  5-40 mL IntraVENous 2 times per day    [Held by provider] heparin (porcine)  5,000 Units SubCUTAneous 
[facility]   Progress Note                   Date:   11/6/2024  Patient name: Claudia Malone  Date of admission:  10/28/2024  5:38 PM  MRN:   [id number]  YOB: 1945  PCP: Aime Barrett MD    Reason for Admission:  Altered mental status    Subjective:   Patient seen and examined at the bedside: No acute issues overnight, monitors down on rounds  unable to completely review telemetry Per RN Estefania tele .      Brief HPI:  79 y.o.  female who is admitted to the hospital for altered mental status.  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  She was last well-known on Wednesday.  EMS was called and she was intubated at scene for airway protection.  She was also noted to be hypotensive and was started on pressor support.  In the ED, she was hypothermic.  Labs revealed troponin 45, elevated CK and myoglobin consistent with rhabdomyolysis, JUAN ANTONIO with creatinine 2.3-2 0.9-2.8.  Thrombocytopenia.  CT head was unremarkable, CT chest revealed no concern for aortic aneurysm/dissection, mild cardiomegaly with LVH.  She went into atrial flutter followed by atrial fibrillation with RVR and cardiology was consulted for further recommendations.    Medications:   Scheduled Meds:   spironolactone  50 mg Oral Daily    carvedilol  3.125 mg Oral BID WC    predniSONE  15 mg Oral Daily    Followed by    [START ON 11/8/2024] predniSONE  10 mg Oral Daily    Followed by    [START ON 11/10/2024] predniSONE  5 mg Oral Daily    latanoprost  1 drop Both Eyes Nightly    insulin glargine  10 Units SubCUTAneous Daily    insulin lispro  0-16 Units SubCUTAneous Q6H    lactulose  10 g Orogastric TID    cefTRIAXone (ROCEPHIN) IV  2,000 mg IntraVENous Q24H    levothyroxine  12.5 mcg Orogastric Daily    sodium chloride flush  5-40 mL IntraVENous 
[facility]   Progress Note                   Date:   11/8/2024  Patient name: Claudia Malone  Date of admission:  10/28/2024  5:38 PM  MRN:   [id number]  YOB: 1945  PCP: Aime Barrett MD    Reason for Admission:  Altered mental status    Subjective:   Seen & examined in room. No acute CV issues/concerns overnight. Labs, vitals, & tele reviewed. Remains SR with rare PVCs. Overnight events noted with CT showing acute/subacute stroke and neurology consultation      Brief HPI:  79 y.o.  female who is admitted to the hospital for altered mental status.  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  She was last well-known on Wednesday.  EMS was called and she was intubated at scene for airway protection.  She was also noted to be hypotensive and was started on pressor support.  In the ED, she was hypothermic.  Labs revealed troponin 45, elevated CK and myoglobin consistent with rhabdomyolysis, JUAN ANTONIO with creatinine 2.3-2 0.9-2.8.  Thrombocytopenia.  CT head was unremarkable, CT chest revealed no concern for aortic aneurysm/dissection, mild cardiomegaly with LVH.  She went into atrial flutter followed by atrial fibrillation with RVR and cardiology was consulted for further recommendations.    Medications:   Scheduled Meds:   potassium chloride  40 mEq Oral Once    insulin glargine  15 Units SubCUTAneous Daily    melatonin  3 mg Oral Nightly    pantoprazole  40 mg Oral BID AC    spironolactone  50 mg Oral Daily    carvedilol  3.125 mg Oral BID WC    predniSONE  10 mg Oral Daily    Followed by    [START ON 11/10/2024] predniSONE  5 mg Oral Daily    latanoprost  1 drop Both Eyes Nightly    insulin lispro  0-16 Units SubCUTAneous Q6H    lactulose  10 g Orogastric TID    cefTRIAXone (ROCEPHIN) IV  2,000 mg 
INTENSIVE CARE UNIT  Resident Physician Progress Note    Patient - Claudia Malone  Date of Admission -  10/28/2024  5:38 PM  Date of Evaluation -  11/5/2024  Room and Bed Number -  3001/3001-01   Hospital Day - 8      SUBJECTIVE:     CHIEF COMPLAINT:      AMS     OVERNIGHT EVENTS:     No acute events overnight    TODAY:    No acute events overnight. Afebrile and hemodynamically. Improvement in mentation, more awake today. AAO x3. Following commands. Worked with PT/OT  Passed swallow study this morning.   Saturating appropriately on 3L nasal cannula. Used BiPAP overnight. Systolic bp 140-150s this morning on lopressor.   UOP 4.2 L s/p diuril x1. On IV fluids. + 11.5 L since admission  BM x1 on lactulose TID  Glucose 154. On lantus 10U daily. Received 8U HDSS / 24hrs  On rocephin for E.coli bacteremia secondary to pyelonephritis   Na 149 / K 2.5 / Cl 109   Metabolic alkalosis, HCO3 30   Improving kidney function, BUN 55 / Cr 1.2 on IV fluids  Hemoglobin and platelets stable (95)      Plan  Speech evaluation   Switch to Coreg 3.125 BID   Aldactone  Repeat potassium  DC ART line  Transfer to step down??  Taper off steroids       Brief History:   History was obtained from chart review and family.       Claudia Malone is a 79 y.o. 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 Per family,  Last well-known was on Wednesday when the patient spoke to her neighbor.  Today patient's brother arrived at her home and found patient lying down on the kitchen, moaning, surrounded by emesis and feces- black tarry stools.  EMS was called and patient was intubated for airway protection, was hypotensive and started on epi infusion and was transferred to emergency department.     Upon arrival to ED, patient was found to be's hypothermic with core 
Inpatient Diabetes Education    Claudia Malone was seen for follow up.    Therapy just completed care and Claudia is resting comfortably in bed. She is pleasant and willing to engage in conversation but has difficulty answering questions about her diabetes self-care prior to admission. She is unable to tell me what a good blood sugar value is. She is unable to tell me any details about her medication regimen.     She is able to dialog about some general eating concepts.     Lab Results   Component Value Date    LABA1C 11.9 (H) 10/31/2024     Daughter arrives and I spoke with her with patient's permission. Daughter states that she is still working with care coordination on discharge plans but anticipates facility.    Education Folder given yesterday remains at bedside.     Thank you for the referral.   Will sign off for now.  STEVE KENNY RN       Note below is from my co-worker yesterday - It has been placed below for reference.    Diabetes Inpatient Education - 11/6/24     Order Questions    Question Answer   Reason for Consult? DKA secondary to uncontrolled diabetes, needs education regarding diet and insulin     Writer rounded, met with patient and daughter Aida.  Patient was awake, minimally verbal and only oriented to self, she not able to give details about her diabetes home self care, she acknowledged with a nod when writer described home plan as per EHR: use pills and a home BG meter.   Daughter Aida was aware of Mom's diabetes diagnosis but did not  know her meds or daily routiens, but did think she has a home BG meter. She did confirm patient has PCP Aime Barrett MD, and that this MD cares for her Mom's diabetes as well,      Writer updated patient and daughter as to current care plan with insulins and current A1c   Lab Results   Component Value Date    LABA1C 11.9 (H) 10/31/2024     Related that with elevated A1c - best practice would indicate insulin also may be needed longer term.    Daughter 
NEPHROLOGY PROGRESS NOTE      ASSESSMENT     Acute kidney injury nonoliguric secondary to ischemic ATN from circulatory septic shock (E. Coli) along with contrast nephropathy status post 2 hemodialysis sessions.  Baseline creatinine normal.  No evidence of IR GN  Hypernatremia secondary to free water deficit/impaired thirst from mechanical ventilation.  No evidence of primary sodium again.  Volume overload-capillary leak//iatrogenic  E. coli bacteremia/UTI on antibiotics  Circulatory septic shock off pressors currently  Acute hypoxic respiratory failure on mechanical ventilation status post extubation  New onset atrial fibrillation  History of type 2 diabetes  History of MONROY     PLAN     Encourage oral hydration.  Discontinue IV fluid once p.o. intake optimized  Increase spironolactone to 50 mg a day  1 dose of Diuril  Will sign off      SUBJECTIVE   Brought to the hospital when found down with initial assessment disclosing hypothermia hypotensive female with further workup revealing evidence of E. coli UTI and bacteremia for which antibiotics were initiated.  Hospital course was complicated by worsening of kidney function from baseline of normal to the point of needing 2 sessions of dialysis and following that showing signs of renal recovery.    Status post extubation.  Doing well.  No acute hemodynamic issues overnight  Excellent diuresis with IV Diuril.  Also spironolactone.  Potassium noted to be low with alkalosis chloride responsive likely from diuresis.  Renal function continues to improve    OBJECTIVE     Vitals:    11/05/24 0700 11/05/24 0728 11/05/24 0729 11/05/24 0825   BP:    (!) 154/78   Pulse: 56 56 58 (!) 49   Resp: 18 17 17    Temp: 98.4 °F (36.9 °C)      TempSrc:       SpO2: 95% 97% 96%    Weight:       Height:         24HR INTAKE/OUTPUT:    Intake/Output Summary (Last 24 hours) at 11/5/2024 0924  Last data filed at 11/5/2024 0722  Gross per 24 hour   Intake 2243.13 ml   Output 4245 ml   Net 
[facility]    Admission Medication Reconciliation     The patient's list of current home medications has been reviewed.     Source(s) of information: Dispense Report; unable to verify with patient due to altered mental status     Based on information provided by the above source(s), no changes to the patient's home medication list were necessary.     Please feel free to call me with any questions about this encounter. Thank you.    Electronically signed by MICHEL GARIBAY RPH on 11/7/2024 at 3:25 PM   
Nephrology Progress Note      SUBJECTIVE       Pt was seen and examined.  Discussed with patient's family at bedside.  Patient continues to be intubated, off all pressors .  Blood pressures are in the 120s this morning.  Patient received a total of 2 hemodialysis treatments .    Last 24 hrs excellent urine output .  She is hypokalemic likely secondary to diuretics and is somewhat alkalotic .    Background history:  79-year-old patient with history of type 2 diabetes mellitus, chronic hypertension, dyslipidemia, MONROY-presented to the hospital after being found down by family.  She was minimally responsive and was surrounded by emesis and feces.  She was intubated in the field and started on pressors for hypotension and she was hypothermic as well.    She had elevated lactate, elevated white count.   blood cultures and urine cultures came back positive for E. coli, .  Baseline serum creatinine normal at 0.75  Patient with JUAN ANTONIO secondary to septic shock with significant azotemia      OBJECTIVE      CURRENT TEMPERATURE:  Temp: (!) 38.3 °F (3.5 °C)  MAXIMUM TEMPERATURE OVER 24HRS:  Temp (24hrs), Av.4 °F (36.3 °C), Min:38.3 °F (3.5 °C), Max:99 °F (37.2 °C)    CURRENT RESPIRATORY RATE:  Respirations: 14  CURRENT PULSE:  Pulse: 60  CURRENT BLOOD PRESSURE:  BP: (!) 156/79  24HR BLOOD PRESSURE RANGE:  Systolic (24hrs), Av , Min:108 , Max:159   ; Diastolic (24hrs), Av, Min:70, Max:117    24HR INTAKE/OUTPUT:    Intake/Output Summary (Last 24 hours) at 2024 1002  Last data filed at 2024 0703  Gross per 24 hour   Intake 2710.14 ml   Output 2205 ml   Net 505.14 ml     WEIGHT :Patient Vitals for the past 96 hrs (Last 3 readings):   Weight   24 0600 89.9 kg (198 lb 3.1 oz)   24 0512 93.1 kg (205 lb 4 oz)   10/31/24 1349 91.6 kg (201 lb 15.1 oz)     PHYSICAL EXAM      GENERAL APPEARANCE: Intubated, off pressors   SKIN: warm and dry, no rash or erythema  EYES: conjunctivae normal and sclera 
Nephrology Progress Note      SUBJECTIVE      Patient seen and examined this morning.  Discussed with patient's family at bedside.  She is currently intubated, she is on 3 pressors.  Last 24-hour urine output was around 950 mL.  Overall she is on a positive fluid balance of around 10 L or so.  Blood cultures are positive for gram-negative rods, E. Coli  She does have a temporary dialysis catheter in her right IJ.  Despite fluid resuscitation, her BUN/creatinine have continued to be quite elevated at 115 and 2.9 today.  Family updated regarding dialysis which had tentatively planned for this morning.  She has been having some intermittent tachycardia and cardiology is planning an amiodarone drip.    Background history:  79-year-old patient with history of type 2 diabetes mellitus, chronic hypertension, dyslipidemia, MONROY-presented to the hospital after being found down by family.  She was minimally responsive and was surrounded by emesis and feces.  She was intubated in the field and started on pressors for hypotension and she was hypothermic as well.    She had elevated lactate, elevated white count, blood cultures came back positive for E. coli, urine showing too numerous to count white cells.  Baseline serum creatinine normal at 0.75  Patient with JUAN ANTONIO secondary to septic shock with significant azotemia.    OBJECTIVE      CURRENT TEMPERATURE:  Temp: 99.7 °F (37.6 °C)  MAXIMUM TEMPERATURE OVER 24HRS:  Temp (24hrs), Av °F (37.8 °C), Min:99.5 °F (37.5 °C), Max:100.6 °F (38.1 °C)    CURRENT RESPIRATORY RATE:  Respirations: 16  CURRENT PULSE:  Pulse: (!) 137  CURRENT BLOOD PRESSURE:  BP: 133/66  24HR BLOOD PRESSURE RANGE:  Systolic (24hrs), Av , Min:80 , Max:154   ; Diastolic (24hrs), Av, Min:48, Max:78    24HR INTAKE/OUTPUT:    Intake/Output Summary (Last 24 hours) at 10/30/2024 0922  Last data filed at 10/30/2024 0800  Gross per 24 hour   Intake 7421.82 ml   Output 975 ml   Net 6446.82 ml     WEIGHT 
Nephrology Progress Note      SUBJECTIVE      Patient seen and examined.  Discussed with patient's family at bedside as well.  Patient currently intubated, she is still on low-dose of Levophed.  Blood pressures are a little bit more stable.  She did receive hemodialysis treatment #1 yesterday.  We did not remove any fluid on dialysis yesterday because of hemodynamic status.  She is overall positive almost 12 L  Current urinary output is between 30 to 50 mL an hour.  Labs this morning show a BUN/creatinine of 88 and 2.0 and a potassium of 4.0 all improved.  Her tachyarrhythmia certainly better  Dialysis access is a left IJ temporary catheter     Background history:  79-year-old patient with history of type 2 diabetes mellitus, chronic hypertension, dyslipidemia, MONROY-presented to the hospital after being found down by family.  She was minimally responsive and was surrounded by emesis and feces.  She was intubated in the field and started on pressors for hypotension and she was hypothermic as well.    She had elevated lactate, elevated white count, blood cultures and urine cultures came back positive for E. coli, .  Baseline serum creatinine normal at 0.75  Patient with JUAN ANTONIO secondary to septic shock with significant azotemia.    OBJECTIVE      CURRENT TEMPERATURE:  Temp: 97.3 °F (36.3 °C)  MAXIMUM TEMPERATURE OVER 24HRS:  Temp (24hrs), Av.5 °F (36.9 °C), Min:97.2 °F (36.2 °C), Max:99.7 °F (37.6 °C)    CURRENT RESPIRATORY RATE:  Respirations: 14  CURRENT PULSE:  Pulse: 81  CURRENT BLOOD PRESSURE:  BP: 139/88  24HR BLOOD PRESSURE RANGE:  Systolic (24hrs), Av , Min:108 , Max:160   ; Diastolic (24hrs), Av, Min:57, Max:88    24HR INTAKE/OUTPUT:    Intake/Output Summary (Last 24 hours) at 10/31/2024 0859  Last data filed at 10/31/2024 0711  Gross per 24 hour   Intake 3810.45 ml   Output 1055 ml   Net 2755.45 ml     WEIGHT :Patient Vitals for the past 96 hrs (Last 3 readings):   Weight   10/31/24 0510 89.9 kg 
Nutrition Assessment     Type and Reason for Visit: Reassess    Nutrition Recommendations/Plan:   Continue current ONS orders, strawberry or chocolate per Pt preference     Malnutrition Assessment:  Malnutrition Status: At risk for malnutrition    Nutrition Assessment:  Pt remains on Dysphagia Soft and bite sized diet at recommendation of SLP on 11/5 with standard ONS (Ensure Plus High Protein) TID with meals.  Spoke with patient, daughter, and aide who all report poor overall intake with ~25% of most meals consumed.  This is below estimated needs.  Pt does like and mostly drink ONS, although does not like the vanilla.  Will verify that only chocolate or strawberry are ordered per preference.  Encouraged Pt/daughter to eat meal first, then drink Ensure after or between meals to encourage meal intakes.  Daughter is concerned wtih the high sugar content of Ensure, though this writer did explain that with such poor meal intakes, Ensure is preferred over Glucerna at this time for increased kcal and protein intake.  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  Encouraged Pt/daughter to ask for RD if further questions/concerns arise.  No new weight available to assess.  Pt will likely meet estimated nutritional needs if 100% of ONS and at least 25% of meals are consumed.    Estimated Daily Nutrient Needs:  Energy (kcal):  1024-0863 kcal/d per MSJ Weight Used for Energy Requirements: Current     Protein (g):   gm pro/day Weight Used for Protein Requirements: Ideal        Fluid (ml/day):  per MD Method Used for Fluid Requirements: Other (Comment)    Nutrition Related Findings:   Meds/Labs reviewed.  Meds include Lactulose.  Trace, 1+ edema to face, extremeties, generalized. Wound Type: Multiple    Current Nutrition Therapies:    ADULT DIET; Dysphagia - Soft and Bite Sized  ADULT ORAL NUTRITION SUPPLEMENT; Breakfast, Lunch, Dinner; 
Occupational Therapy  Facility/Department: [facility]- [facility]PD[facility]   Daily Treatment Note  Patient Name: Claudia Malone        MRN: [id number]    : 1945    Date of Service: 2024    Discharge Recommendations  Discharge Recommendations: Patient would benefit from continued therapy after discharge    OT Equipment Recommendations  Equipment Needed:  (CTA as pt. progresses. Currently would need lift equipment.)    Assessment  Performance deficits / Impairments: Decreased functional mobility ;Decreased ADL status;Decreased ROM;Decreased strength;Decreased safe awareness;Decreased cognition;Decreased endurance;Decreased balance;Decreased high-level IADLs;Decreased fine motor control;Decreased coordination;Decreased posture  Assessment: Pt independent in ADL/IADLs prior to admission. Pt would continue to benefit from OT services to address deficits listed above and improve overall functional performance prior to admission.  Prognosis: Good  Decision Making: High Complexity  REQUIRES OT FOLLOW-UP: Yes  Activity Tolerance  Activity Tolerance: Patient limited by fatigue;Treatment limited secondary to decreased cognition;Patient Tolerated treatment well  Activity Tolerance Comments: Significant weakness  Safety Devices  Type of Devices: Call light within reach;Left in bed;Nurse notified;Bed alarm in place;Heels elevated for pressure relief  Restraints  Restraints Initially in Place: No    Restrictions/Precautions  Restrictions/Precautions  Restrictions/Precautions: Fall Risk;General Precautions;Bed Alarm;Up as Tolerated  Required Braces or Orthoses?: No  Position Activity Restriction  Other position/activity restrictions: extubated     Subjective  General  Patient assessed for rehabilitation services?: Yes  Family / Caregiver Present: No  General Comment  Comments: RN ok'd patient for OT treatment this date. Pt agreeable with encouragement, semi lethargic but increased alertness with sitting EOB. Pt denied pain 
Occupational Therapy  Occupational Therapy Initial Evaluation  Facility/Department: [facility] CAR 3- MICU     Patient Name: Claudia Malone        MRN: [id number]    : 1945    Date of Service: 2024    Discharge Recommendations  Discharge Recommendations: Patient would benefit from continued therapy after discharge  OT Equipment Recommendations  Equipment Needed:  (CTA as patient progresses)    Chief Complaint   Patient presents with    Altered Mental Status     Past Medical History:  has no past medical history on file.  Past Surgical History:  has no past surgical history on file.    Assessment  Performance deficits / Impairments: Decreased functional mobility ;Decreased ADL status;Decreased ROM;Decreased strength;Decreased safe awareness;Decreased cognition;Decreased endurance;Decreased balance;Decreased high-level IADLs;Decreased fine motor control;Decreased coordination;Decreased posture  Assessment: Pt independent in ADL/IADLs prior to admission. Pt would continue to benefit from OT services to address deficits listed above and improve overall functional performance prior to admission.  Prognosis: Good  Decision Making: High Complexity  REQUIRES OT FOLLOW-UP: Yes  Activity Tolerance  Activity Tolerance: Treatment limited secondary to decreased cognition  Safety Devices  Type of Devices: Call light within reach;Left in bed;Nurse notified  Restraints  Restraints Initially in Place: No    Restrictions/Precautions  Restrictions/Precautions  Restrictions/Precautions: Fall Risk;General Precautions;Bed Alarm;Up as Tolerated  Required Braces or Orthoses?: No  Position Activity Restriction  Other position/activity restrictions: extubated     Subjective  General  Patient assessed for rehabilitation services?: Yes  Family / Caregiver Present: Yes (daughter)  General Comment  Comments: RN ok'd OT eval this date. Pt pleasant, cooperative, and agreeable to therapy throughout entire session. Pt reporting pain in 
Order obtained for extubation.  SpO2 of 95 on 40% FiO2.   Patient extubated and placed on NIV 10/5 40%.   Post extubation SpO2 is 96% with HR  56 bpm and RR 19 breaths/min.    Patient had strong cough that was non-productive.  Extubation Well tolerated by patient..   Breath Sounds: clear, diminished    ROCKY HERRING RCP   2:54 PM  
PHARMACY NOTE:    The electrolyte replacement protocol for potassium/magnesium has been discontinued per P&T guidelines because the patient has reduced renal function (CrCl < 30 mL/min).      The patient's most recent potassium & magnesium levels are:  Recent Labs     10/28/24  1758 10/29/24  0347 10/29/24  1552 10/29/24  2103 10/30/24  0325   K 3.7   < > 4.0 4.5 4.9   MG 3.5*  --   --  2.3  --     < > = values in this interval not displayed.     Estimated Creatinine Clearance: 17 mL/min (A) (based on SCr of 2.9 mg/dL (H)).    For patients with decreased renal function (below 30ml/min) needing potassium/magnesium supplementation, please order individual bolus doses with appropriate monitoring.      Please contact the inpatient pharmacy with any concerns.    Thank you.  Natalie Tapia, PharmD BCPS [facility]  10/30/2024 8:52 AM    
Patient admitted on Mechanical Ventilator Protocol. Patients height measured at 162.6 cm for an IBW 54.7    Patient placed on the ventilator on settings as charted on flowsheeet.         Ventilator Bronchodilator assessment    Breath sounds: Diminished  Inspiratory Pressure: 21  Plateau Pressure: 20    Patient assessed at level 3          [x]    Bronchodilator Assessment    BRONCHODILATOR ASSESSMENT SCORE  Score 0 (Home) 1 2 3 4   Breath Sounds   []  Chronic Ventilator: Patient at baseline [x]  Mild Wheezes/ Clear []  Intermittent wheezes with good air entry []  Bilateral/unilateral wheezing with diminished air entry []  Insp/Exp wheeze and/or poor aeration   Ventilator Pressures   []  Chronic Ventilator [x]  Insp. Pressure less than 25 cm H20 []  Insp. Pressure less than 25 cm H20 []  Insp. Pressure exceeds 25 cm H20 []  Insp. Pressure exceeds 30 cm H20   Plateau Pressure []  NA   [x]  Plateau Pressure less than 4  []  Plateau Pressure less than or equal to 5 []  Plateau Pressure greater than or equal to 6 []  Plateau Pressure greater than or equal to 8       Geovanna Sanchez RCP  10:28 PM  
Physical Therapy  Facility/Department: [facility] CAR 2- STEPDOWN  Physical Therapy Treatment Note    Name: Claudia Malone  : 1945  MRN: [id number]  Date of Service: 2024    Discharge Recommendations:  Patient would benefit from continued therapy after discharge          Patient Diagnosis(es): The primary encounter diagnosis was Hypothermia, initial encounter. Diagnoses of Sepsis, due to unspecified organism, unspecified whether acute organ dysfunction present (HCC), Acute cystitis without hematuria, and Atrial fibrillation, unspecified type (HCC) were also pertinent to this visit.  Past Medical History:  has no past medical history on file.  Past Surgical History:  has no past surgical history on file.    Assessment  Body Structures, Functions, Activity Limitations Requiring Skilled Therapeutic Intervention: Decreased functional mobility ;Decreased tolerance to work activity;Decreased strength;Decreased endurance;Decreased balance;Increased pain  Assessment: Pt presents with significant decline in prior level of function. Pt completed theraputic exercise in recliner. Pt will continue to benefit from PT intervention  to progress functional abilty and mobility and facilitate improve active participation and initiation in mobilty.  Therapy Prognosis: Good  Activity Tolerance  Activity Tolerance: Patient limited by fatigue;Patient limited by endurance    Plan  Physical Therapy Plan  General Plan:  (5-6x week)  Specific Instructions for Next Treatment: Progress activity to tolerated out of bed activity  Current Treatment Recommendations: Strengthening, Balance training, Functional mobility training, Transfer training, Endurance training, Gait training, Stair training, Neuromuscular re-education, Home exercise program, Safety education & training, Therapeutic activities  Safety Devices  Type of Devices: Call light within reach, Nurse notified, Heels elevated for pressure relief, Left in chair  Restraints  Restraints 
Physical Therapy  Facility/Department: [facility] CAR 3- MICU  Physical Therapy Initial Assessment    Name: Claudia Malone  : 1945  MRN: [id number]  Date of Service: 2024    Chief Complaint   Patient presents with    Altered Mental Status        Discharge Recommendations:  Therapy recommended at discharge   PT Equipment Recommendations  Other: TBD pending progress      Patient Diagnosis(es): The primary encounter diagnosis was Hypothermia, initial encounter. Diagnoses of Sepsis, due to unspecified organism, unspecified whether acute organ dysfunction present (HCC), Acute cystitis without hematuria, and Atrial fibrillation, unspecified type (HCC) were also pertinent to this visit.  Past Medical History:  has no past medical history on file.  Past Surgical History:  has no past surgical history on file.    Assessment  Body Structures, Functions, Activity Limitations Requiring Skilled Therapeutic Intervention: Decreased functional mobility ;Decreased tolerance to work activity;Decreased strength;Decreased endurance;Decreased balance;Increased pain  Assessment: Pt presents with significant decline in prior level of functiona. Pt require max a x 2 for bed mobility, max assist to sit from supine in long sitting position with poor trunk control and poor initiation. Pt is currently unsafe to sit EOB or attempt stand. Pt will continue to benefit from PT intervention  to progress functional abilty and mobility and facilitate improve active participation and initiation in mobilty.  Specific Instructions for Next Treatment: Progress activity to tolerated out of bed activity  Therapy Prognosis: Good  Decision Making: Medium Complexity  Clinical Presentation: evolving  Requires PT Follow-Up: Yes  Activity Tolerance  Activity Tolerance: Patient tolerated evaluation without incident;Patient limited by fatigue;Patient limited by endurance;Other (comment)    Plan  Physical Therapy Plan  General Plan:  (5-6x/wk)  Specific 
Physical Therapy  Facility/Department: [facility] CAR 2- STEPDOWN  Physical Therapy daily treatment note   Name: Claudia Malone  : 1945  MRN: [id number]  Date of Service: 2024    Discharge Recommendations:  Patient would benefit from continued therapy after discharge   PT Equipment Recommendations  Equipment Needed: No  Other: TBD pending progress      Patient Diagnosis(es): The primary encounter diagnosis was Hypothermia, initial encounter. Diagnoses of Sepsis, due to unspecified organism, unspecified whether acute organ dysfunction present (HCC), Acute cystitis without hematuria, Atrial fibrillation, unspecified type (HCC), and Generalized muscle weakness were also pertinent to this visit.  Past Medical History:  has a past medical history of HLD (hyperlipidemia), HTN (hypertension), Liver cirrhosis secondary to MONROY (nonalcoholic steatohepatitis) (HCC), and Type 2 diabetes mellitus (HCC).  Past Surgical History:  has no past surgical history on file.    Assessment  Assessment: Pt presents with significant decline in prior level of function. Pt completed theraputic exercise in recliner to include bilateral LE A/AAROM in all planes x 10 each to improve functional strength, seated static and dynamic balance with focus on trunck control needed for stabilty needed to progress mobility and prepare for transfers . Pt with feet placed on step to encourage WB with imput through bilateral feet .  Pt will continue to benefit from PT intervention  to progress functional abilty and mobility and facilitate improve active participation and initiation in mobilty.  Requires PT Follow-Up: Yes    Plan  Physical Therapy Plan  General Plan:  (5-6x week)  Specific Instructions for Next Treatment: Progress activity to tolerated out of bed activity  Current Treatment Recommendations: Strengthening, Balance training, Functional mobility training, Transfer training, Endurance training, Gait training, Stair training, Neuromuscular 
Primary notified patients bp 179/82 with hr of 78. Nothing on prn for pressure.   
Problem: OXYGENATION/RESPIRATORY FUNCTION  Goal: Patient will maintain patent airway  Outcome: Ongoing  Goal: Patient will achieve/maintain normal respiratory rate/effort  Respiratory rate and effort will be within normal limits for the patient  Outcome: Ongoing    Problem: MECHANICAL VENTILATION  Goal: Patient will maintain patent airway  Outcome: Ongoing  Goal: Oral health is maintained or improved  Outcome: Ongoing  Goal: ET tube will be managed safely  Outcome: Ongoing  Goal: Ability to express needs and understand communication  Outcome: Ongoing  Goal: Mobility/activity is maintained at optimum level for patient  Outcome: Ongoing    Problem: ASPIRATION PRECAUTIONS  Goal: Patient’s risk of aspiration is minimized  Outcome: Ongoing    Problem: SKIN INTEGRITY  Goal: Skin integrity is maintained or improved  Outcome: Ongoing                    
Problem: OXYGENATION/RESPIRATORY FUNCTION  Goal: Patient will maintain patent airway  Outcome: Ongoing  Goal: Patient will achieve/maintain normal respiratory rate/effort  Respiratory rate and effort will be within normal limits for the patient  Outcome: Ongoing    Problem: MECHANICAL VENTILATION  Goal: Patient will maintain patent airway  Outcome: Ongoing  Goal: Oral health is maintained or improved  Outcome: Ongoing  Goal: ET tube will be managed safely  Outcome: Ongoing  Goal: Ability to express needs and understand communication  Outcome: Ongoing  Goal: Mobility/activity is maintained at optimum level for patient  Outcome: Ongoing    Problem: ASPIRATION PRECAUTIONS  Goal: Patient’s risk of aspiration is minimized  Outcome: Ongoing    Problem: SKIN INTEGRITY  Goal: Skin integrity is maintained or improved  Outcome: Ongoing                    
Provider notified. Bp 177/83 HR 78. Nothing on prn. Awaiting orders. Will follow plan of care    
Report given to RICKY Graham - all questions answered to RN's satisfaction.  
Speech Language Pathology  [facility]    Dysphagia Treatment Note    Date: 11/12/2024  Patient’s Name: Claudia Malone  MRN: [id number]  Diagnosis:   Patient Active Problem List   Diagnosis Code    Sepsis (HCC) A41.9    Acute hypoxic respiratory failure J96.01    Metabolic acidosis E87.20    Acute kidney failure (HCC) N17.9    Rhabdomyolysis M62.82    Urinary tract infection N39.0    Atrial fibrillation (HCC) I48.91    Decompensated hepatic cirrhosis (HCC) K72.90, K74.60    E. coli septicemia (HCC) A41.51    Pyelonephritis N12    Acute encephalopathy G93.40    JUAN ANTONIO (acute kidney injury) (HCC) N17.9    History of type 2 diabetes mellitus Z86.39    MONROY (nonalcoholic steatohepatitis) K75.81    Thrombocytopenia (HCC) D69.6    Cerebrovascular accident (CVA) (HCC) I63.9    Debility R53.81    Gram-neg septicemia (HCC) A41.50    Acute metabolic encephalopathy G93.41    Generalized muscle weakness M62.81    Septic shock (HCC) A41.9, R65.21       Pain: 4/10 Mid back, Pt requesting to be repositioned, RN aware.     Dysphagia Treatment  Treatment time:5038-3506    Subjective: [x] Alert [x] Cooperative     [] Confused     [] Agitated    [] Lethargic    Objective/Assessment:    Pt. Seen for diet tolerance monitoring, currently the pt is on Dysphagia soft and bite/sized (Dysphagia III) diet with thin liquids. Pt reports diet is going well with no overt s/s of aspiration noted. Pt completed PO trials of mixed consistency (puree/with crushed cracker) and zero sugar soda via straw, pt tolerated without any overt s/s of aspiration noted. SLP reviewed and educated pt on compensatory swallow strategies such as small bites/sips, one bite/sip at a time, and upright 90 degrees for all PO intake. Pt verbalized understanding. Pt to be discharged from skilled ST services at this time as pt is tolerating least restrictive diet level. ST to sign off.         Plan:  [] Continue ST services    [x] Discharge from ST:  
Spiritual Health History and Assessment/Progress Note  [facility]    Family Care,  (Pt found down at home), Adjustment to illness,      Name: Claudia Malone MRN: [id number]    Age: 79 y.o.     Sex: female   Language: English   B[employer]ist: Episcopal   Septic shock (HCC)     Date: 10/30/2024            Total Time Calculated: 6 min              Spiritual Assessment continued in [facility] CAR 3- MICU        Referral/Consult From: Rounding   Encounter Overview/Reason: Family Care  Service Provided For: Family    Pt intubated. Pt's daughter and brother present along with another visitor. They appeared to be coping and said they need to be patient for now. Dtr said pt's brother is a good support to her.    Adela, Belief, Meaning:   Patient unable to assess at this time  Family/Friends Other: Not discussed in this visit      Importance and Influence:  Patient Other:    Family/Friends have no beliefs influential to healthcare decision-making identified during this visit    Community:  Patient Other:    Family/Friends well-supported by each other    Assessment and Plan of Care:     Patient Interventions include: Other:    Family/Friends Interventions include: Active listening, words of affirmation     Patient Plan of Care: Spiritual Care available upon further referral  Family/Friends Plan of Care: Spiritual Care available upon further referral    Electronically signed by MAYELA Jeong on 10/30/2024 at 2:24 PM        10/30/24 1420   Encounter Summary   Encounter Overview/Reason Family Care   Service Provided For Family   Referral/Consult From [facility] [place]   Support System Children;Family members   Last Encounter  10/30/24   Complexity of Encounter Low   Begin Time 1414   End Time  1420   Total Time Calculated 6 min   Assessment/Intervention/Outcome   Assessment Coping   Intervention Active listening;Explored/Affirmed feelings, thoughts, concerns;Prayer (assurance of)/[place]   Outcome Engaged in 
The transport originated from [facility]. Pt. was transported to CT and back. Assisting with the transport was RN + RT + SRT.  Appropriate devices were applied to monitor the patient's condition during transport. Patient transported  via 100% O2 via ventilator. Patient tolerated well.        KAIDEN PISANO RCP  6:59 PM  
This PULMONARY & CRITICAL CARE MEDICINE PROGRESS NOTE     Patient:  Claudia Malnoe  MRN: [id number]  Admit date: 10/28/2024  Primary Care Physician: Aime Barrett MD  Consulting Physician: Rosa Giles MD  CODE Status: Full Code  LOS: 10     SUBJECTIVE     CHIEF COMPLAINT/REASON FOR INITIAL CONSULT:Altered Mental Status    BRIEF HOSPITAL COURSE:  The patient is a 79 y.o. female initially admitted to ICU with sepsis, DKA and new onset A-fib.  Patient was initially on pressor support, and required invasive mechanical ventilation.  Her urine and blood cultures were positive for E. coli.  She was extubated on  and was transferred out of ICU.    INTERVAL HISTORY:  24  Patient is currently on nasal cannula@O2 Flow Rate (L/min)  Av L/min  Min: 2 L/min  Max: 2 L/min  Afebrile  Hemodynamically stable  She remains on Rocephin   JUAN ANTONIO has resolved    REVIEW OF SYSTEMS:  Review of Systems   Constitutional:  Positive for fatigue. Negative for fever.   HENT:  Negative for voice change.    Eyes:  Negative for visual disturbance.   Respiratory:  Positive for cough and shortness of breath.    Gastrointestinal:  Negative for abdominal pain, diarrhea and vomiting.   Genitourinary:  Negative for dysuria and urgency.   Musculoskeletal:  Negative for joint swelling.   Allergic/Immunologic: Negative for environmental allergies and immunocompromised state.   Neurological:  Positive for weakness.   Hematological:  Negative for adenopathy. Does not bruise/bleed easily.   Psychiatric/Behavioral:  Negative for behavioral problems.        OBJECTIVE     VITAL SIGNS:   LAST:  /63   Pulse 70   Temp 98 °F (36.7 °C) (Oral)   Resp 20   Ht 1.626 m (5' 4.02\")   Wt 93.8 kg (206 lb 14.4 oz)   SpO2 96%   BMI 35.50 kg/m²   8-24 HR RANGE:  TEMP Temp  Av.1 °F (36.7 °C)  Min: 97.8 °F (36.6 °C)  Max: 98.6 °F (37 °C)   BP Systolic (24hrs), Av , Min:119 , Max:155      Diastolic (24hrs), Av, Min:63, Max:75     PULSE 
This PULMONARY & CRITICAL CARE MEDICINE PROGRESS NOTE     Patient:  Claudia Malone  MRN: [id number]  Admit date: 10/28/2024  Primary Care Physician: Aime Barrett MD  Consulting Physician: Rosa Giles MD  CODE Status: Full Code  LOS: 11     SUBJECTIVE     CHIEF COMPLAINT/REASON FOR INITIAL CONSULT:Altered Mental Status    BRIEF HOSPITAL COURSE:  The patient is a 79 y.o. female initially admitted to ICU with sepsis, DKA and new onset A-fib.  Patient was initially on pressor support, and required invasive mechanical ventilation.  Her urine and blood cultures were positive for E. coli.  She was extubated on  and was transferred out of ICU.    INTERVAL HISTORY:  24  Patient is currently on room air  Afebrile  Hemodynamically stable  She remains on Rocephin     REVIEW OF SYSTEMS:  Review of Systems   Constitutional:  Positive for fatigue. Negative for fever.   HENT:  Negative for voice change.    Eyes:  Negative for visual disturbance.   Respiratory:  Positive for cough and shortness of breath.    Gastrointestinal:  Negative for abdominal pain, diarrhea and vomiting.   Genitourinary:  Negative for dysuria and urgency.   Musculoskeletal:  Negative for joint swelling.   Allergic/Immunologic: Negative for environmental allergies and immunocompromised state.   Neurological:  Positive for weakness.   Hematological:  Negative for adenopathy. Does not bruise/bleed easily.   Psychiatric/Behavioral:  Negative for behavioral problems.        OBJECTIVE     VITAL SIGNS:   LAST:  BP (!) 141/73   Pulse 67   Temp 97.6 °F (36.4 °C) (Oral)   Resp 15   Ht 1.626 m (5' 4.02\")   Wt 93.8 kg (206 lb 14.4 oz)   SpO2 92%   BMI 35.50 kg/m²   8-24 HR RANGE:  TEMP Temp  Av.9 °F (36.6 °C)  Min: 97.4 °F (36.3 °C)  Max: 98.3 °F (36.8 °C)   BP Systolic (24hrs), Av , Min:121 , Max:141      Diastolic (24hrs), Av, Min:63, Max:74     PULSE Pulse  Av.6  Min: 67  Max: 72   RR Resp  Av.5  Min: 15  Max: 18   O2 SAT 
This PULMONARY & CRITICAL CARE MEDICINE PROGRESS NOTE     Patient:  Claudia Malone  MRN: [id number]  Admit date: 10/28/2024  Primary Care Physician: Aime Barrett MD  Consulting Physician: Rosa Giles MD  CODE Status: Full Code  LOS: 9     SUBJECTIVE     CHIEF COMPLAINT/REASON FOR INITIAL CONSULT:Altered Mental Status    BRIEF HOSPITAL COURSE:  The patient is a 79 y.o. female initially admitted to ICU with sepsis, DKA and new onset A-fib.  Patient was initially on pressor support, and required invasive mechanical ventilation.  Her urine and blood cultures were positive for E. coli.  She was extubated on  and was transferred out of ICU.    INTERVAL HISTORY:  24  Patient is currently on nasal cannula@O2 Flow Rate (L/min)  Avg: 3 L/min  Min: 3 L/min  Max: 3 L/min  T-max is 98.7, white count is 12.3  She remains on Rocephin   JUAN ANTONIO is gradually improving    REVIEW OF SYSTEMS:  Review of Systems   Constitutional:  Positive for fatigue. Negative for fever.   HENT:  Negative for voice change.    Eyes:  Negative for visual disturbance.   Respiratory:  Positive for cough and shortness of breath.    Gastrointestinal:  Negative for abdominal pain, diarrhea and vomiting.   Genitourinary:  Negative for dysuria and urgency.   Musculoskeletal:  Negative for joint swelling.   Allergic/Immunologic: Negative for environmental allergies and immunocompromised state.   Neurological:  Positive for weakness.   Hematological:  Negative for adenopathy. Does not bruise/bleed easily.   Psychiatric/Behavioral:  Negative for behavioral problems.        OBJECTIVE     VITAL SIGNS:   LAST:  /68   Pulse 84   Temp 98.7 °F (37.1 °C) (Oral)   Resp 20   Ht 1.626 m (5' 4.02\")   Wt 91.2 kg (201 lb 1 oz)   SpO2 98%   BMI 34.49 kg/m²   8-24 HR RANGE:  TEMP Temp  Av.1 °F (36.7 °C)  Min: 97.3 °F (36.3 °C)  Max: 98.7 °F (37.1 °C)   BP Systolic (24hrs), Av , Min:127 , Max:162      Diastolic (24hrs), Av, Min:68, Max:84   
This PULMONARY & CRITICAL CARE MEDICINE PROGRESS NOTE     Patient:  Claudia Malone  MRN: [id number]  Admit date: 10/28/2024  Primary Care Physician: Aime Barrett MD  Consulting Physician: Rosa Giles MD  CODE Status: Full Code  LOS: 12     SUBJECTIVE     CHIEF COMPLAINT/REASON FOR INITIAL CONSULT:Altered Mental Status    BRIEF HOSPITAL COURSE:  The patient is a 79 y.o. female initially admitted to ICU with sepsis, DKA and new onset A-fib.  Patient was initially on pressor support, and required invasive mechanical ventilation.  Her urine and blood cultures were positive for E. coli.  She was extubated on 11/4 and was transferred out of ICU.    INTERVAL HISTORY:  11/09/24  Overnight events noted chart reviewed.  Patient is hemodynamically stable overnight she is on room air maintaining saturation 92 to 96% and she is afebrile.  Labs shows BUN is 28 creatinine 0.9 bicarbonate 30 WBC 9.7 hemoglobin 9.5.  She is on Rocephin for E. coli septicemia followed by infectious disease.    She is a non-smoker no history of COPD no history of asthma  She does not complain of much cough she denies shortness of breath at rest she is very weak she will need assisted feeding with nursing staff no aspiration was reported by nursing staff.    Echocardiogram 10/28/2024 showed EF of 50 to 55% mild concentric hypertrophy estimated RVSP 26    REVIEW OF SYSTEMS:  Review of Systems   Constitutional:  Positive for fatigue. Negative for fever.   HENT:  Negative for voice change.    Eyes:  Negative for visual disturbance.   Respiratory:  Negative for cough and shortness of breath.    Gastrointestinal:  Negative for abdominal pain, diarrhea and vomiting.   Genitourinary:  Negative for dysuria and urgency.   Musculoskeletal:  Negative for joint swelling.   Allergic/Immunologic: Negative for environmental allergies and immunocompromised state.   Neurological:  Positive for weakness.   Hematological:  Negative for adenopathy. Does not 
Ventilator Bronchodilator assessment    Breath sounds: clear/diminished  Inspiratory Pressure: 23  Plateau Pressure: 22    Patient assessed at level 1          [x]    Bronchodilator Assessment    BRONCHODILATOR ASSESSMENT SCORE  Score 0 (Home) 1 2 3 4   Breath Sounds   []  Chronic Ventilator: Patient at baseline [x]  Mild Wheezes/ Clear []  Intermittent wheezes with good air entry []  Bilateral/unilateral wheezing with diminished air entry []  Insp/Exp wheeze and/or poor aeration   Ventilator Pressures   []  Chronic Ventilator [x]  Insp. Pressure less than 25 cm H20 []  Insp. Pressure less than 25 cm H20 []  Insp. Pressure exceeds 25 cm H20 []  Insp. Pressure exceeds 30 cm H20   Plateau Pressure []  NA   [x]  Plateau Pressure less than 4  []  Plateau Pressure less than or equal to 5 []  Plateau Pressure greater than or equal to 6 []  Plateau Pressure greater than or equal to 8       Adela Lui RCP  5:14 AM  
Writer was asked by ED triage to escort family to MICU waiting room.  met with daughter, brother, and niece of patient in private ED waiting room.  met family on bridge and provided a ministry of presence. Daughter did not appear to mind  presence and engaged in conversation about patient's health.  informed staff of family presence and was asked by physician to be present during medical update.  provided a supportive presence through active listening and words of affirmation.     Electronically signed by Johnny Cho on 10/28/2024 at 11:15 PM     
  Net 935.95 ml       General appearance:Amv  Respiratory::vesicular breath sounds,no wheeze/crackles  Cardiovascular:S1 S2 normal,no gallop or organic murmur.  Abdomen:Non tender/non distended.Bowel sounds present  Extremities: No Cyanosis or Clubbing,present Lower extremity edema  Neurological:mv      MEDICATIONS     Scheduled Meds:    insulin glargine  30 Units SubCUTAneous Daily    metoprolol tartrate  12.5 mg Oral BID    spironolactone  25 mg Oral Daily    midodrine  10 mg Oral TID WC    insulin lispro  0-16 Units SubCUTAneous Q6H    lactulose  10 g Orogastric TID    cefTRIAXone (ROCEPHIN) IV  2,000 mg IntraVENous Q24H    hydrocortisone sodium succinate PF  100 mg IntraVENous Q8H    levothyroxine  12.5 mcg Orogastric Daily    sodium chloride flush  5-40 mL IntraVENous 2 times per day    [Held by provider] heparin (porcine)  5,000 Units SubCUTAneous 3 times per day    pantoprazole (PROTONIX) 40 mg in sodium chloride (PF) 0.9 % 10 mL injection  40 mg IntraVENous Q12H    insulin regular  1-20 Units IntraVENous Once     Continuous Infusions:    sodium chloride 50 mL/hr at 11/04/24 0702    dexmedeTOMIDine 0.2 mcg/kg/hr (11/04/24 0702)    sodium chloride Stopped (11/03/24 0850)    phenylephrine (WYATT-SYNEPHRINE) 100 mg in sodium chloride 0.9 % 250 mL infusion Stopped (11/02/24 1108)    norepinephrine Stopped (10/31/24 1736)    fentaNYL Stopped (11/01/24 0802)    sodium chloride Stopped (11/02/24 1645)    VASOpressin Stopped (10/31/24 0343)    dextrose       PRN Meds:  potassium chloride **OR** potassium alternative oral replacement **OR** potassium chloride, fentanNYL, heparin (porcine), heparin (porcine), midazolam, sodium chloride flush, sodium chloride, ondansetron **OR** ondansetron, polyethylene glycol, acetaminophen **OR** acetaminophen, glucose, dextrose bolus **OR** dextrose bolus, glucagon (rDNA), dextrose, albuterol  Home Meds:                Medications Prior to Admission: senna (SENOKOT) 8.6 MG tablet, 
  PHYSICAL EXAM:  Physical Exam  Constitutional:       General: She is awake.      Appearance: She is not ill-appearing.      Interventions: Nasal cannula in place.   HENT:      Head: Normocephalic and atraumatic.   Eyes:      General: No scleral icterus.     Conjunctiva/sclera: Conjunctivae normal.   Cardiovascular:      Rate and Rhythm: Normal rate.      Heart sounds: S1 normal and S2 normal. No murmur heard.  Pulmonary:      Effort: No accessory muscle usage or respiratory distress.      Breath sounds: Decreased air movement present.   Abdominal:      General: Bowel sounds are normal.      Palpations: Abdomen is soft.      Tenderness: There is no abdominal tenderness.   Musculoskeletal:      Cervical back: Neck supple.      Right lower leg: Edema present.      Left lower leg: Edema present.   Skin:     Coloration: Skin is not pale.      Findings: No rash.   Neurological:      General: No focal deficit present.         DATA REVIEW     CURRENT MEDICATIONS:  Scheduled Meds:   polyethylene glycol  17 g Oral Once    ciprofloxacin  4 drop Both Ears BID    And    dexAMETHasone  4 drop Both Ears BID    aspirin  81 mg Oral Daily    apixaban  5 mg Oral BID    potassium chloride  40 mEq Oral Once    insulin glargine  15 Units SubCUTAneous Daily    melatonin  3 mg Oral Nightly    pantoprazole  40 mg Oral BID AC    spironolactone  50 mg Oral Daily    carvedilol  3.125 mg Oral BID WC    latanoprost  1 drop Both Eyes Nightly    insulin lispro  0-16 Units SubCUTAneous Q6H    lactulose  10 g Orogastric TID    levothyroxine  12.5 mcg Orogastric Daily    sodium chloride flush  5-40 mL IntraVENous 2 times per day    insulin regular  1-20 Units IntraVENous Once     Continuous Infusions:   sodium chloride Stopped (11/02/24 1645)    dextrose         INPUT/OUTPUT:  In: -   Out: 1750 [Urine:1750]  Date 11/11/24 0000 - 11/11/24 2359   Shift 7433-7015 7868-2356 6894-5210 24 Hour Total   INTAKE   Shift Total(mL/kg)       OUTPUT 
(total 2020 mL TF + flushes) daily    Additional Calorie Sources:  None    Anthropometric Measures:  Height: 162.6 cm (5' 4.02\")  Ideal Body Weight (IBW): 120 lbs (55 kg)    Admission Body Weight: 86.2 kg (190 lb 0.6 oz)  Current Body Weight: 92.4 kg (203 lb 11.3 oz), 169.8 % IBW. Weight Source: Bed scale  Current BMI (kg/m2): 34.9           Weight Adjustment For: No Adjustment                 BMI Categories: Obese Class 1 (BMI 30.0-34.9)    Estimated Daily Nutrient Needs:  Energy Requirements Based On: Kcal/kg  Weight Used for Energy Requirements: Ideal  Energy (kcal/day): 3543-6851 kcals/day  Weight Used for Protein Requirements: Ideal  Protein (g/day):  gm pro/day  Method Used for Fluid Requirements: Other (Comment)  Fluid (ml/day): per MD    Nutrition Diagnosis:   Inadequate oral intake related to impaired respiratory function as evidenced by NPO or clear liquid status due to medical condition, nutrition support - enteral nutrition    Nutrition Interventions:   Food and/or Nutrient Delivery:  (Monitor for possible extubation and plans for oral diet vs need for continued tube feedings.)  Nutrition Education/Counseling: No recommendation at this time  Coordination of Nutrition Care: Continue to monitor while inpatient  Plan of Care discussed with: RN    Goals:  Goals: Meet at least 75% of estimated needs, Maintain adequate nutrition status  Type of Goal: New goal  Previous Goal Met: Progressing toward Goal(s)    Nutrition Monitoring and Evaluation:   Behavioral-Environmental Outcomes: None Identified  Food/Nutrient Intake Outcomes: Progression of Nutrition, Diet Advancement/Tolerance  Physical Signs/Symptoms Outcomes: Biochemical Data, GI Status, Fluid Status or Edema, Hemodynamic Status, Weight, Skin, Nutrition Focused Physical Findings    Discharge Planning:    Too soon to determine     Milady Bautista RD, LD  Contact: [phone]  
for any abnormality.        Past history of diabetes, history of MONROY with varices    Interval changes  10/30/2024   Patient Vitals for the past 8 hrs:   BP Temp Temp src Pulse Resp SpO2 Weight   10/30/24 1115 (!) 132/57 -- -- 70 -- -- --   10/30/24 1100 (!) 117/58 -- -- 74 -- -- --   10/30/24 1053 108/67 -- -- 76 -- -- --   10/30/24 1052 108/69 99.7 °F (37.6 °C) -- 76 20 98 % 87.3 kg (192 lb 7.4 oz)   10/30/24 0800 -- -- Bladder -- -- -- --   10/30/24 0753 -- -- -- (!) 137 16 97 % --   10/30/24 0645 -- -- -- (!) 136 18 96 % --   10/30/24 0630 -- -- -- (!) 131 19 96 % --   10/30/24 0615 -- -- -- (!) 130 19 96 % --   10/30/24 0600 -- 99.7 °F (37.6 °C) -- (!) 130 16 96 % 87.3 kg (192 lb 8 oz)   10/30/24 0545 -- -- -- (!) 129 14 96 % --   10/30/24 0530 -- -- -- (!) 129 19 96 % --   10/30/24 0515 -- -- -- (!) 135 20 96 % --   10/30/24 0500 -- 99.9 °F (37.7 °C) -- (!) 129 17 96 % --   10/30/24 0445 -- -- -- (!) 134 23 96 % --   10/30/24 0430 -- -- -- (!) 128 18 97 % --   10/30/24 0415 -- -- -- (!) 133 15 97 % --   10/30/24 0400 133/66 100 °F (37.8 °C) Bladder (!) 134 22 97 % --   10/30/24 0345 -- -- -- (!) 133 15 96 % --   10/30/24 0330 -- -- -- (!) 133 17 96 % --     10/30/24  BC E coli and U cx E coli, not ESBL  Still intubated FIO2   40 - and sp small - reactive today to pain and grimaces -  CPK better 759  WBC better 22  BC E coli ESBL neg      Summary of relevant labs:  Labs:  Lactic Acid, Whole Blood4.4 High   ALT91 High U/L [id number] High   Creatinine2.5 High   Lactic Acid, Whole Blood5.9 High   WBC11.3   beta-hydroxybutyrate 6.15  Myoglobin 9165  CPK 1564  Lactic acid 3    Micro:  BC E coli 10/28  Nasal MRSA 10/28  Sp cx  10/28  U cx  10/28  UA infective 10/28  Procedures      Cardiology      Imaging:  CXR  Cardiomegaly with pulmonary vascular congestion and pulmonary edema    CT chest AP  1. No evidence of thoracic aortic aneurysm or dissection.  2. Medium-sized hiatal hernia containing proximal stomach.  3. 
observe for now no active intervention during this admission but recommended extensive physical therapy.  - working on discharge  -Continue statin    Review of Systems   Constitutional:  Negative for activity change, appetite change, chills, diaphoresis, fatigue, fever and unexpected weight change.   Respiratory:  Negative for cough, choking, chest tightness, shortness of breath, wheezing and stridor.    Cardiovascular:  Negative for chest pain, palpitations and leg swelling.   Gastrointestinal:  Negative for abdominal distention, abdominal pain, constipation, nausea and vomiting.   Genitourinary:  Negative for dysuria, frequency, hematuria, urgency and vaginal pain.   Musculoskeletal:  Negative for arthralgias, back pain, joint swelling and myalgias.   Neurological:  Negative for dizziness, tremors, seizures, weakness and headaches.   Hematological:  Negative for adenopathy. Does not bruise/bleed easily.   Psychiatric/Behavioral:  Negative for confusion, hallucinations and suicidal ideas.       BRIEF HISTORY   Patient was brought to the emergency department by EMS after patient was found unresponsive at her home. Per family,  Last well-known was on Wednesday 10/30/2024 when the patient spoke to her neighbor.  On the day of admission patient's brother arrived at her home and found patient lying down on the kitchen, moaning, surrounded by emesis and feces- black tarry stools.  EMS was called and patient was intubated for airway protection, was hypotensive and started on epi infusion and was transferred to emergency department.     Upon arrival to ED, patient was found to be in hypothermic with core temperature 28.3 C.  Placed on warmer.  Patient initially had feeble thready pulses. patient was hypotensive and was started on Levophed.  Pertinent labs at admission JUNA ANTONIO with , creatinine 2.3.  Lactate 3.  Blood glucose 263.  Elevated CK15 64, elevated myoglobin 9165.  .  Beta hydroxybutyrate 
sheet for your Vehicle - ADA Diabetes Emergencies   _x__ Diabetes ID card - ADA Low and High Blood Sugar and treatments  _x__ [facility] Education brochure/ contact card      Diabetes Education / HCP follow -up :   _x_ Would recommend follow -up education at outpatient diabetes education at [facility]. An ordered is needed for this service and can be placed via EHR. Discharge Navigator --- Med Reconciliation -- New orders for discharge tab -- search diabetic ed - REF20 -  [facility]  - review and sign - An order for outpatient Diabetes Education after discharge can come from a Hospital Provider or the PCP.    Diabetes Education team can follow up with patient as family as time allows. Held on skills teaching of insulin for today - may need this as follow up closer to discharge.    SAMEER MYERS RN  [facility]      
         XII - midline tongue without atrophy or fasciculation   Motor function  Strength: Able to raise BLEs antigravity in seated position; 4/5  Couldn't raise BUEs against gravity 2/5  Normal bulk and tone                Sensory function Grossly intact     Cerebellar No visible tremors   Reflex function BUEs 2/4   Patellar reflexes 1/4  Ankle reflexes - absent  Plantars - flexor   Gait                  Not tested       DATA    Lab Results   Component Value Date    WBC 9.1 11/10/2024    RBC 2.95 (L) 11/10/2024    HGB 9.1 (L) 11/10/2024    HCT 28.1 (L) 11/10/2024     11/10/2024    ALT 65 (H) 10/31/2024    ALT 66 (H) 10/31/2024     (H) 10/31/2024     (H) 10/31/2024     11/10/2024    K 4.1 11/10/2024    MG 2.1 11/07/2024    PHOS 3.4 11/01/2024     11/10/2024    AMMONIA 28 11/01/2024    CREATININE 0.8 11/10/2024    BUN 24 (H) 11/10/2024    CO2 28 11/10/2024    TSH 1.25 11/06/2024    INR 2.2 10/30/2024    GHOEILQE88 >2000 (H) 11/07/2024    FOLATE 7.3 11/07/2024    LABA1C 11.9 (H) 10/31/2024     Lab Results   Component Value Date    CHOL 135 11/08/2024     Lab Results   Component Value Date    TRIG 170 (H) 11/08/2024     Lab Results   Component Value Date    HDL 22 (L) 11/08/2024     Lab Results   Component Value Date    LDL 79 11/08/2024     Lab Results   Component Value Date    VLDL 34 (H) 11/08/2024     Lab Results   Component Value Date    CHOLHDLRATIO 6.1 11/08/2024      10/29/24 03:28 11/01/24 05:12   Ammonia 72 (H) 28       DIAGNOSTIC DATA:  CT HEAD (10/28/2024 & 11/1/2024): No acute intracranial abnormality     REPEAT CT HEAD (11/9/2024): No acute intracranial abnormality     CT C-SPINE (10/28/2024): No acute abnormality of the cervical spine.     CTA HEAD & NECK (11/8/2024):   1. R cervical ICA: 70-75% stenosis with fibrocalcific plaque  2. L V1, origin: moderate narrowing   3. L P2, mid: severe focal high-grade stenosis   4. R paraclinoid ICA: moderate narrowing       MRI BRAIN 
(GLYCOLAX) packet 17 g, Daily PRN  acetaminophen (TYLENOL) tablet 650 mg, Q6H PRN   Or  acetaminophen (TYLENOL) suppository 650 mg, Q6H PRN  [Held by provider] heparin (porcine) injection 5,000 Units, 3 times per day  pantoprazole (PROTONIX) 40 mg in sodium chloride (PF) 0.9 % 10 mL injection, Q12H  VASOpressin (VASOSTRICT) 20 units in dextrose 5% 100 mL infusion, Continuous  glucose chewable tablet 16 g, PRN  dextrose bolus 10% 125 mL, PRN   Or  dextrose bolus 10% 250 mL, PRN  glucagon injection 1 mg, PRN  dextrose 10 % infusion, Continuous PRN  insulin regular (HumuLIN R;NovoLIN R) injection 1-20 Units, Once  albuterol (PROVENTIL) (2.5 MG/3ML) 0.083% nebulizer solution 2.5 mg, Q4H PRN          LABS      CBC:   Recent Labs     11/01/24  0512 11/02/24  0600 11/03/24  0549   WBC 13.8* 11.8* 7.8   RBC 3.38* 3.59* 3.22*   HGB 10.4* 10.8* 9.8*   HCT 29.8* 31.7* 29.4*   MCV 88.2 88.3 91.3   MCH 30.8 30.1 30.4   MCHC 34.9* 34.1 33.3   RDW 14.6* 14.6* 14.8*   PLT See Reflexed IPF Result See Reflexed IPF Result See Reflexed IPF Result      BMP:   Recent Labs     11/01/24  0512 11/02/24  0600 11/02/24  1635 11/03/24  0549    145  --  147*   K 3.3* 2.6* 3.5* 4.0    106  --  112*   CO2 25 26  --  26   BUN 56* 53*  --  63*   CREATININE 1.5* 1.5*  --  1.5*   GLUCOSE 199* 231*  --  330*   CALCIUM 7.9* 7.7*  --  7.6*     PHOSPHORUS:    Recent Labs     10/31/24  1600 10/31/24  2039 11/01/24  0304   PHOS 3.0 3.1 3.4     MAGNESIUM:   Recent Labs     11/01/24  0512 11/02/24  0600 11/02/24  1635   MG 1.9 1.9 1.9       SPEP:   Lab Results   Component Value Date/Time    ALBCAL 2.0 10/29/2024 10:03 AM    ALBPCT 42 10/29/2024 10:03 AM    A1PCT 12 10/29/2024 10:03 AM    A2PCT 20 10/29/2024 10:03 AM    BETAPCT 11 10/29/2024 10:03 AM    GAMGLOB 0.8 10/29/2024 10:03 AM    GGPCT 16 10/29/2024 10:03 AM    PATH Reviewed by pathologist:  Cynthia Keita M.D. 10/29/2024 10:03 AM     HEPBSAG:  Lab Results   Component Value Date/Time    
COMMANDS:  [x] No   [] Yes    SECRETIONS Amount:  [] Small [] Moderate  [] Large  [] None  Color:     [] White [] Colored  [] Bloody    SEDATION:  RAAS Score:  [] Propofol gtt  [] Versed gtt  [] Ativan gtt   [] No Sedation    PARALYZED:  [x] No    [] Yes    VASOPRESSORS:  [] No    [x] Yes  [x] Levophed [] Dopamine [] Vasopressin  [] Dobutamine [x] Phenylephrine [] Epinephrine    OBJECTIVE:     VITAL SIGNS:  /88   Pulse 59   Temp 97.2 °F (36.2 °C)   Resp 18   Ht 1.626 m (5' 4.02\")   Wt 89.9 kg (198 lb 3.2 oz)   SpO2 94%   BMI 34.00 kg/m²   Tmax over 24 hours:  Temp (24hrs), Av.7 °F (37.1 °C), Min:97.2 °F (36.2 °C), Max:99.7 °F (37.6 °C)      Patient Vitals for the past 8 hrs:   BP Temp Temp src Pulse Resp SpO2 Weight   10/31/24 0600 -- 97.2 °F (36.2 °C) -- 59 18 94 % --   10/31/24 0545 -- -- -- 67 18 97 % --   10/31/24 0530 -- -- -- 60 18 94 % --   10/31/24 0515 -- -- -- 76 12 97 % --   10/31/24 0510 -- -- -- -- -- -- 89.9 kg (198 lb 3.2 oz)   10/31/24 0500 -- 97.2 °F (36.2 °C) -- 60 18 95 % --   10/31/24 0445 -- -- -- 62 18 96 % --   10/31/24 0430 -- -- -- 59 18 95 % --   10/31/24 0415 -- -- -- 59 18 95 % --   10/31/24 0400 139/88 97.3 °F (36.3 °C) Bladder 76 20 97 % --   10/31/24 0345 -- -- -- 63 18 97 % --   10/31/24 0330 -- -- -- 59 18 96 % --   10/31/24 0315 -- -- -- 80 17 95 % --   10/31/24 0311 -- -- -- 74 18 95 % --   10/31/24 0300 -- 97.3 °F (36.3 °C) -- 58 18 96 % --   10/31/24 0245 -- -- -- 53 18 95 % --   10/31/24 0230 -- -- -- 55 18 95 % --   10/31/24 0215 -- -- -- 56 18 95 % --   10/31/24 0200 -- 97.3 °F (36.3 °C) -- 54 18 95 % --   10/31/24 0145 -- -- -- 54 18 95 % --   10/31/24 0130 -- -- -- 55 18 95 % --   10/31/24 0115 -- -- -- 55 18 94 % --   10/31/24 0100 -- 97.3 °F (36.3 °C) -- 55 18 94 % --   10/31/24 0045 -- -- -- 56 18 94 % --   10/31/24 0030 -- -- -- 57 18 96 % --   10/31/24 0015 -- -- -- 58 18 94 % --   10/31/24 0000 128/69 97.5 °F (36.4 °C) Bladder 56 18 97 % --   10/30/24 
Comment: pt perseverating on wanting a drink of ice water throughout session, and stating \"I am so tired, I just want to sleep.\" pt has fear of falling, needing encouragment and reassurance.    Objective    Bed mobility  Supine to Sit: Dependent/Total;2 Person assistance  Sit to Supine: Maximum assistance;2 Person assistance  Scooting: Dependent/Total;2 Person assistance  Bed Mobility Comments: Sup to sit HOB elevated ~30 degrees. Sit to sup HOB flat.  Transfers  Comment: unsafe        Balance  Posture: Poor  Sitting - Static: Poor  Sitting - Dynamic: Poor;-  Comments: Sitting balance assessed to EOB 25-30 min. pt demonstrates posterior/left lean, requiring MaxA-ModA. ModA when pt has hold of side rails and MaxA vc for correction. ModA-[place] with BUE elbows resting on bedside tray in front of pt.  Exercise Treatment: Sitting balance.       OutComes Score     AM-PAC - Mobility    AM-PAC Basic Mobility - Inpatient   How much help is needed turning from your back to your side while in a flat bed without using bedrails?: Total  How much help is needed moving from lying on your back to sitting on the side of a flat bed without using bedrails?: Total  How much help is needed moving to and from a bed to a chair?: Total  How much help is needed standing up from a chair using your arms?: Total  How much help is needed walking in hospital room?: Total  How much help is needed climbing 3-5 steps with a railing?: Total  AM-PAC Inpatient Mobility Raw Score : 6  AM-PAC Inpatient T-Scale Score : 23.55  Mobility Inpatient CMS 0-100% Score: 100  Mobility Inpatient CMS G-Code Modifier : CN    Goals  Short Term Goals  Time Frame for Short Term Goals: 14 visits  Short Term Goal 1: Pt to complete bed mobilty with min A and sit EOB needed to propare for transfers.  Short Term Goal 2: Pt to tolerate sitting EOB with [place] and fair + sitting to prepare to stand from sitting  Short Term Goal 3: Pt to tolerate bilateral upper and LE P/AA/AROM 
Mild regurgitation. Normal RVSP. The estimated RVSP is 26 mmHg  (based on TR doppler only).    Image quality is adequate.      Assessment / Acute Cardiac Problems:     New onset atrial fibrillation/flutter with RVR, [id number] 5  Acute metabolic encephalopathy-multifactorial  Acute hypoxic respiratory failure requiring mechanical ventilation  Shock-likely septic from UTI  JUAN ANTONIO  Thrombocytopenia  Decompensated liver cirrhosis secondary to MONROY with esophageal varices  Type 2 diabetes mellitus  Hypertension  Hyperlipidemia      Plan of Treatment:   Stable. Remains SR. Continue PO BB.  Not a candidate for amiodarone /anticoagulation due to thrombocytopenia/liver cirrhosis. Noted hem/onc clearance for AC if PLT > 100,000. Will f/u as OP and consider AC if reoccurance of Afib. Risk/benefit reviewed in detail with patient. Questions addressed. Verb understanding and agreement to plan  volume management  per nephrology.  Keep K>4 and Mag>2.     Astrid Batista, APRN - CNP     
fibrillation/flutter with RVR, [id number] 5  Acute metabolic encephalopathy-multifactorial  Acute hypoxic respiratory failure requiring mechanical ventilation  Shock-likely septic from UTI  JUAN ANTONIO  Thrombocytopenia  Decompensated liver cirrhosis secondary to MONROY with esophageal varices  Type 2 diabetes mellitus  Hypertension  Hyperlipidemia      Plan of Treatment:   Afib Remains SR. Continue PO BB.    Plts are back to normal. Hem/onc recommending AC. Neuro ok with AC. Pt started on eliquis   volume management  per nephrology.  Keep K>4 and Mag>2.       FARTUN MOONEY, APRN - CNP     
hepatosplenomegaly   Chest - clear to auscultation, no wheezes, rales or rhonchi, symmetric but poor  air entry   Heart - normal rate, regular rhythm, normal S1, S2, no murmurs  Abdomen - soft, nontender, nondistended, no masses or organomegaly   Neurological - alert, oriented, normal speech, no focal findings or movement disorder noted   Musculoskeletal - no joint tenderness, deformity or swelling   Extremities - peripheral pulses normal, no pedal edema, no clubbing or cyanosis   Skin - normal coloration and turgor, no rashes, no suspicious skin lesions noted ,    DATA:    Labs:   CBC:   Recent Labs     11/10/24  0656 11/11/24  0642   WBC 9.1 7.6   HGB 9.1* 9.5*   HCT 28.1* 30.5*    326     BMP:   Recent Labs     11/10/24  0656 11/11/24  0642    138   K 4.1 4.3   CO2 28 25   BUN 24* 20   CREATININE 0.8 0.8   LABGLOM 75 75   GLUCOSE 134* 121*     PT/INR: No results for input(s): \"PROTIME\", \"INR\" in the last 72 hours.    IMAGING DATA:      Primary Problem  Sepsis (HCC)    Active Hospital Problems    Diagnosis Date Noted    Acute metabolic encephalopathy [G93.41] 11/10/2024    Generalized muscle weakness [M62.81] 11/10/2024    Gram-neg septicemia (HCC) [A41.50] 11/09/2024    Cerebrovascular accident (CVA) ([facility]) [I63.9] 11/08/2024    Debility [R53.81] 11/08/2024    Thrombocytopenia ([facility]) [D69.6] 11/06/2024    History of type 2 diabetes mellitus [Z86.39] 10/30/2024    MONROY (nonalcoholic steatohepatitis) [K75.81] 10/30/2024    E. coli septicemia (HCC) [A41.51] 10/29/2024    Pyelonephritis [N12] 10/29/2024    Acute encephalopathy [G93.40] 10/29/2024    Sepsis (HCC) [A41.9] 10/28/2024    Acute kidney failure (HCC) [N17.9] 10/28/2024    Rhabdomyolysis [M62.82] 10/28/2024    Urinary tract infection [N39.0] 10/28/2024    Atrial fibrillation (HCC) [I48.91] 10/28/2024    Decompensated hepatic cirrhosis (HCC) [K72.90, K74.60] 10/28/2024         IMPRESSION:   Mild thrombocytopenia likely secondary to acute 
movement towards EOB.    Transfers  Sit to stand: Unable to assess  Stand to sit: Unable to assess  Transfer Comments: Poor sitting and tolerance, decreased alertness       Functional Mobility: Unable to assess (Comment)  Functional Mobility Skilled Clinical Factors: ALIDA d/t poor sitting balance and tolerance, significant weakness     Exercises  OT Exercises  Exercise Treatment: Pt tolerated 10 reps each BUE exercises each joint/plane to increase strength endurance and promote functional use for increased independence with ADL tasks and mobility. Pt demonstrated slight initiation only for exercises with poor follow through    Patient Education  Patient Education  Education Given To: Patient;Family  Education Provided: Role of Therapy;Transfer Training;Orientation  Education Provided Comments: Importance of increased activity, body mechanics and awareness  Education Method: Demonstration;Verbal  Barriers to Learning: Cognition  Education Outcome: Continued education needed    Goals  Short Term Goals  Time Frame for Short Term Goals: By discharge, pt will  Short Term Goal 1: demo feeding/grooming/UB ADLs with Min A, adaptive techniques PRN.  Short Term Goal 2: demo LB ADLs with Mod A, adaptive techniques PRN.  Short Term Goal 3: demo safe bed mobility with Mod A for engagement in ADLs.  Short Term Goal 4: demo dynamic sitting during functional activities for 10+ mins with Mod A.  Short Term Goal 5: engage in PROM to all joints of BUE's x10 reps to prevent contractures and improve overall independence.    Plan  Occupational Therapy Plan  Times Per Week: 3-4x/wk  Current Treatment Recommendations: Strengthening, ROM, Balance training, Functional mobility training, Endurance training, Pain management, Safety education & training, Patient/Caregiver education & training, Equipment evaluation, education, & procurement, Self-Care / ADL, Cognitive reorientation, Coordination training, Cognitive/Perceptual training    AM-PAC 
secondary to decreased IABP and rhabdomyolysis.  Patient underwent 2 episodes of hemodialysis while in ICU.  Patient has E. coli bacteremia positive blood culture due to secondary to pyelonephritis.  ID on board.  Patient has Vo cirrhosis toxic metabolic  encephalopathy, DKA resolved.  Patient was hemodynamically and vitally was stable and extubated was not on any pressor support and ventilator support that is when patient was transferred out of ICU to medicine team for further management.     OBJECTIVE     Vital Signs:  /69   Pulse 70   Temp 98.1 °F (36.7 °C) (Axillary)   Resp 16   Ht 1.626 m (5' 4.02\")   Wt 93.8 kg (206 lb 14.4 oz)   SpO2 96%   BMI 35.50 kg/m²     Temp (24hrs), Av.1 °F (36.7 °C), Min:97.8 °F (36.6 °C), Max:98.6 °F (37 °C)    In: -   Out: 1100 [Urine:1100]    Physical Exam:  Physical Exam  Constitutional:       General: She is not in acute distress.     Appearance: She is not ill-appearing or toxic-appearing.      Comments: ANO X2   Cardiovascular:      Rate and Rhythm: Normal rate and regular rhythm.      Pulses: Normal pulses.      Heart sounds: Normal heart sounds. No murmur heard.     No gallop.   Pulmonary:      Effort: Pulmonary effort is normal.      Breath sounds: Normal breath sounds. No stridor. No wheezing or rhonchi.   Chest:      Chest wall: No tenderness.   Abdominal:      General: Abdomen is flat. Bowel sounds are normal. There is no distension.      Palpations: Abdomen is soft. There is no mass.      Tenderness: There is no abdominal tenderness.      Hernia: No hernia is present.   Musculoskeletal:      Right lower leg: Edema present.      Left lower leg: Edema present.   Neurological:      Mental Status: She is alert. She is disoriented.   Psychiatric:         Mood and Affect: Mood normal.         Behavior: Behavior normal.           Medications:  Scheduled Medications:    potassium chloride  40 mEq Oral Once    pantoprazole  40 mg Oral BID AC    insulin glargine 
  Education  Patient Education  Education Given To: Patient  Education Provided: Role of Therapy;Plan of Care;Precautions;Transfer Training;Equipment;Fall Prevention Strategies  Education Method: Verbal  Barriers to Learning: None  Education Outcome: Verbalized understanding;Continued education needed    Therapy Time   Individual Concurrent Group Co-treatment   Time In 1512         Time Out 1538         Minutes 26         Timed Code Treatment Minutes: 23 Minutes (Co-Treat with DALTON)  Co- treatment with OT warranted secondary to decreased patient safety and independence with functional mobility requiring skilled physical assistance of two professionals to simultaneously address individualized discipline goals. PT is addressing sitting balance, bed mobility , while OT is addressing their individualized functional mobility/self-care task.           Joseph Horne, PTA         
E. coli sepsis  Pyelonephritis  DKA  History of liver cirrhosis    RECOMMENDATIONS:  Acute on chronic thrombocytopenia secondary to acute illness at chronic liver disease respectively  Patient platelets are back to normal  The patient developed atrial fibrillation and will need anticoagulation  No contraindication to anticoagulation as her platelets are back to normal  The patient has relatively high CHADS2 score.  We will discuss with cardiology and neurology the appropriateness of treating her with antiplatelets only without anticoagulation  Watch hemoglobin and watch clinically for bleeding                              Jorge Pettit MD                          [facility]                            This note is created with the assistance of a speech recognition program.  While intending to generate a document that actually reflects the content of the visit, the document can still have some errors including those of syntax and sound a like substitutions which may escape proof reading.  It such instances, actual meaning can be extrapolated by contextual diversion.    
Exceptions  Oral Phase  Oral Phase - Comment: Prolonged mastication with decreased labial seal and minimal lingual stasis noted.     Indicators of Pharyngeal Phase Dysfunction   Indicators of Pharyngeal Phase Dysfunction  Pharyngeal Phase Comment: No overt s/s of aspiration across all consistencies.    Prognosis  Prognosis: Fair  Prognosis Considerations: Age;Previous Level of Function  Consulted and agree with results and recommendations: Patient;RN;Family member  Family member consulted: Daughter  RN Name: Natali Medina  Patient Education: Yes  Patient Education Response: Verbalizes understanding             Therapy Time  [id number]      ALIRIO Greene  11/5/2024 9:26 AM        
LB ADLs with Mod A, adaptive techniques PRN.  Short Term Goal 3: demo safe bed mobility with Mod A for engagement in ADLs.  Short Term Goal 4: demo dynamic sitting during functional activities for 10+ mins with Mod A.  Short Term Goal 5: engage in PROM to all joints of BUE's x10 reps to prevent contractures and improve overall independence.    Plan  Occupational Therapy Plan  Times Per Week: 3-4x/wk  Current Treatment Recommendations: Strengthening, ROM, Balance training, Functional mobility training, Endurance training, Pain management, Safety education & training, Patient/Caregiver education & training, Equipment evaluation, education, & procurement, Self-Care / ADL, Cognitive reorientation, Coordination training, Cognitive/Perceptual training    AM-[facility] Daily Activities Inpatient  AM-PAC Daily Activity - Inpatient   How much help is needed for putting on and taking off regular lower body clothing?: Total  How much help is needed for bathing (which includes washing, rinsing, drying)?: Total  How much help is needed for toileting (which includes using toilet, bedpan, or urinal)?: Total  How much help is needed for putting on and taking off regular upper body clothing?: A Lot  How much help is needed for taking care of personal grooming?: A Lot  How much help for eating meals?: A Lot  AM-[facility] Inpatient Daily Activity Raw Score: 9  AM-PAC Inpatient ADL T-Scale Score : 25.33  ADL Inpatient CMS 0-100% Score: 79.59  ADL Inpatient CMS G-Code Modifier : CL    Minutes  OT Individual Minutes  Time In: 1233  Time Out: 1313  Minutes: 40  Time Code Minutes   Timed Code Treatment Minutes: 23 Minutes  Co- treatment with PT warranted secondary to decreased patient safety and independence with functional mobility requiring skilled physical assistance of two professionals to simultaneously address individualized discipline goals. OT is addressing BUE ROM and feeding tasks, while PT is addressing their individualized functional 
chronic thrombocytopenia secondary to acute illness at chronic liver disease respectively  Patient platelets are back to normal  The patient developed atrial fibrillation and will need anticoagulation  No contraindication to anticoagulation as her platelets are back to normal  The patient has relatively high CHADS2 score.  We will discuss with cardiology and neurology the appropriateness of treating her with antiplatelets only without anticoagulation  Watch hemoglobin and watch clinically for bleeding                              Jorge Pettit MD                          [facility]                            This note is created with the assistance of a speech recognition program.  While intending to generate a document that actually reflects the content of the visit, the document can still have some errors including those of syntax and sound a like substitutions which may escape proof reading.  It such instances, actual meaning can be extrapolated by contextual diversion.    
need dialysis anymore given significantly better urine output now.   2.  2 doses of diuretics today as well, will need adjustment of diuretics over the next 24 to 48 hours.  3. Follow up labs ordered.   4. Following along       Please do not hesitate to call with questions.    This note is created with the assistance of a speech-recognition program. While intending to generate a document that actually reflects the content of the visit, no guarantees can be provided that every mistake has been identified and corrected by editing    Electronically signed by Matt Lebron MD on 11/1/2024 at 9:57 AM          
normal.         Behavior: Behavior normal.         Thought Content: Thought content normal.         Past Medical History:     Past Medical History:   Diagnosis Date    HLD (hyperlipidemia)     HTN (hypertension)     Liver cirrhosis secondary to MONROY (nonalcoholic steatohepatitis) (HCC)     Type 2 diabetes mellitus (HCC)        Past Surgical  History:   No past surgical history on file.    Medications:      aspirin  81 mg Oral Daily    apixaban  5 mg Oral BID    potassium chloride  40 mEq Oral Once    insulin glargine  15 Units SubCUTAneous Daily    melatonin  3 mg Oral Nightly    pantoprazole  40 mg Oral BID AC    spironolactone  50 mg Oral Daily    carvedilol  3.125 mg Oral BID WC    predniSONE  10 mg Oral Daily    Followed by    [START ON 11/10/2024] predniSONE  5 mg Oral Daily    latanoprost  1 drop Both Eyes Nightly    insulin lispro  0-16 Units SubCUTAneous Q6H    lactulose  10 g Orogastric TID    cefTRIAXone (ROCEPHIN) IV  2,000 mg IntraVENous Q24H    levothyroxine  12.5 mcg Orogastric Daily    sodium chloride flush  5-40 mL IntraVENous 2 times per day    insulin regular  1-20 Units IntraVENous Once       Social History:     Social History     Socioeconomic History    Marital status: Single     Spouse name: Not on file    Number of children: Not on file    Years of education: Not on file    Highest education level: Not on file   Occupational History    Not on file   Tobacco Use    Smoking status: Former     Average packs/day: 1 pack/day for 16.0 years (16.0 ttl pk-yrs)     Types: Cigarettes     Start date: 1980    Smokeless tobacco: Never   Vaping Use    Vaping status: Not on file   Substance and Sexual Activity    Alcohol use: Never    Drug use: Never    Sexual activity: Not on file   Other Topics Concern    Not on file   Social History Narrative    Not on file     Social Determinants of Health     Financial Resource Strain: Not on file   Food Insecurity: No Food Insecurity (10/29/2024)    Hunger Vital Sign 
tenderness, deformity or signs of injury.      Cervical back: Neck supple. No rigidity or tenderness.      Right lower leg: No edema.      Left lower leg: No edema.   Skin:     Coloration: Skin is not jaundiced or pale.      Findings: No bruising, erythema, lesion or rash.   Neurological:      Mental Status: She is alert.      Cranial Nerves: No cranial nerve deficit.   Psychiatric:         Mood and Affect: Mood normal.         Behavior: Behavior normal.         Thought Content: Thought content normal.         Past Medical History:     Past Medical History:   Diagnosis Date    HLD (hyperlipidemia)     HTN (hypertension)     Liver cirrhosis secondary to MONROY (nonalcoholic steatohepatitis) (HCC)     Type 2 diabetes mellitus (HCC)        Past Surgical  History:   No past surgical history on file.    Medications:      ciprofloxacin  4 drop Both Ears BID    And    dexAMETHasone  4 drop Both Ears BID    aspirin  81 mg Oral Daily    apixaban  5 mg Oral BID    potassium chloride  40 mEq Oral Once    insulin glargine  15 Units SubCUTAneous Daily    melatonin  3 mg Oral Nightly    pantoprazole  40 mg Oral BID AC    spironolactone  50 mg Oral Daily    carvedilol  3.125 mg Oral BID WC    predniSONE  5 mg Oral Daily    latanoprost  1 drop Both Eyes Nightly    insulin lispro  0-16 Units SubCUTAneous Q6H    lactulose  10 g Orogastric TID    cefTRIAXone (ROCEPHIN) IV  2,000 mg IntraVENous Q24H    levothyroxine  12.5 mcg Orogastric Daily    sodium chloride flush  5-40 mL IntraVENous 2 times per day    insulin regular  1-20 Units IntraVENous Once       Social History:     Social History     Socioeconomic History    Marital status: Single     Spouse name: Not on file    Number of children: Not on file    Years of education: Not on file    Highest education level: Not on file   Occupational History    Not on file   Tobacco Use    Smoking status: Former     Average packs/day: 1 pack/day for 16.0 years (16.0 ttl pk-yrs)     Types: 
       Last 3 Blood Glucose:   Recent Labs     11/02/24  0600 11/03/24  0549 11/04/24  0529   GLUCOSE 231* 330* 248*        PT/INR:    Lab Results   Component Value Date/Time    PROTIME 24.0 10/30/2024 08:50 AM    INR 2.2 10/30/2024 08:50 AM     PTT:  No results found for: \"APTT\"    Comprehensive Metabolic Profile:   Recent Labs     11/02/24  0600 11/02/24  1635 11/03/24  0549 11/04/24  0529     --  147* 149*   K 2.6* 3.5* 4.0 3.4*     --  112* 112*   CO2 26  --  26 28   BUN 53*  --  63* 60*   CREATININE 1.5*  --  1.5* 1.3*   GLUCOSE 231*  --  330* 248*   CALCIUM 7.7*  --  7.6* 7.7*      Magnesium:   Lab Results   Component Value Date/Time    MG 2.4 11/04/2024 05:29 AM    MG 1.9 11/02/2024 04:35 PM    MG 1.9 11/02/2024 06:00 AM     Phosphorus:   Lab Results   Component Value Date/Time    PHOS 3.4 11/01/2024 03:04 AM    PHOS 3.1 10/31/2024 08:39 PM    PHOS 3.0 10/31/2024 04:00 PM     Ionized Calcium:   Lab Results   Component Value Date/Time    CAION 1.05 10/31/2024 09:06 AM    CAION 1.04 10/30/2024 08:42 PM    CAION 0.92 10/30/2024 08:50 AM        Urinalysis:   Lab Results   Component Value Date/Time    NITRU NEGATIVE 10/28/2024 06:02 PM    COLORU Dark Yellow 10/28/2024 06:02 PM    PHUR 5.5 10/28/2024 06:02 PM    WBCUA TOO NUMEROUS TO COUNT 10/28/2024 06:02 PM    RBCUA 5 TO 10 10/28/2024 06:02 PM    BACTERIA MANY 10/28/2024 06:02 PM    LEUKOCYTESUR MODERATE 10/28/2024 06:02 PM    UROBILINOGEN Normal 10/28/2024 06:02 PM    BILIRUBINUR NEGATIVE  Verified by ictotest. 10/28/2024 06:02 PM    GLUCOSEU NEGATIVE 10/28/2024 06:02 PM    KETUA SMALL 10/28/2024 06:02 PM       HgBA1c:    Lab Results   Component Value Date/Time    LABA1C 11.9 10/31/2024 11:37 AM     TSH:    Lab Results   Component Value Date/Time    TSH 4.26 10/28/2024 05:58 PM     Lactic Acid: No results found for: \"LACTA\"   Troponin: No results for input(s): \"TROPONINI\" in the last 72 hours.    Microbiology:  Urine Culture:  No components found 
10/30/2024 08:50 AM    INR 2.2 10/30/2024 08:50 AM     PTT:  No results found for: \"APTT\"    Comprehensive Metabolic Profile:   Recent Labs     11/01/24  0512 11/02/24  0600 11/02/24  1635 11/03/24  0549    145  --  147*   K 3.3* 2.6* 3.5* 4.0    106  --  112*   CO2 25 26  --  26   BUN 56* 53*  --  63*   CREATININE 1.5* 1.5*  --  1.5*   GLUCOSE 199* 231*  --  330*   CALCIUM 7.9* 7.7*  --  7.6*      Magnesium:   Lab Results   Component Value Date/Time    MG 1.9 11/02/2024 04:35 PM    MG 1.9 11/02/2024 06:00 AM    MG 1.9 11/01/2024 05:12 AM     Phosphorus:   Lab Results   Component Value Date/Time    PHOS 3.4 11/01/2024 03:04 AM    PHOS 3.1 10/31/2024 08:39 PM    PHOS 3.0 10/31/2024 04:00 PM     Ionized Calcium:   Lab Results   Component Value Date/Time    CAION 1.05 10/31/2024 09:06 AM    CAION 1.04 10/30/2024 08:42 PM    CAION 0.92 10/30/2024 08:50 AM        Urinalysis:   Lab Results   Component Value Date/Time    NITRU NEGATIVE 10/28/2024 06:02 PM    COLORU Dark Yellow 10/28/2024 06:02 PM    PHUR 5.5 10/28/2024 06:02 PM    WBCUA TOO NUMEROUS TO COUNT 10/28/2024 06:02 PM    RBCUA 5 TO 10 10/28/2024 06:02 PM    BACTERIA MANY 10/28/2024 06:02 PM    LEUKOCYTESUR MODERATE 10/28/2024 06:02 PM    UROBILINOGEN Normal 10/28/2024 06:02 PM    BILIRUBINUR NEGATIVE  Verified by ictotest. 10/28/2024 06:02 PM    GLUCOSEU NEGATIVE 10/28/2024 06:02 PM    KETUA SMALL 10/28/2024 06:02 PM       HgBA1c:    Lab Results   Component Value Date/Time    LABA1C 11.9 10/31/2024 11:37 AM     TSH:    Lab Results   Component Value Date/Time    TSH 4.26 10/28/2024 05:58 PM     Lactic Acid: No results found for: \"LACTA\"   Troponin: No results for input(s): \"TROPONINI\" in the last 72 hours.    Microbiology:  Urine Culture:  No components found for: \"CURINE\"  Blood Culture:  No components found for: \"CBLOOD\", \"CFUNGUSBL\"  Sputum Culture:  No components found for: \"CSPUTUM\"      Radiology/Imaging:  XR CHEST (SINGLE VIEW FRONTAL)    Result 
8*   MONOPCT 3 4   EOSPCT 0* 0*     BMP:  Recent Labs     11/01/24  0512 11/02/24  0600    145   K 3.3* 2.6*    106   CO2 25 26   BUN 56* 53*   CREATININE 1.5* 1.5*   MG 1.9 1.9     Hepatic Function Panel:   Recent Labs     10/31/24  0311   BILIDIR 0.7*   IBILI 0.2   BILITOT 0.9  0.9   ALKPHOS 106*  110*   ALT 66*  65*   *  102*     No results for input(s): \"RPR\" in the last 72 hours.  No results for input(s): \"HIV\" in the last 72 hours.  No results for input(s): \"BC\" in the last 72 hours.  Lab Results   Component Value Date/Time    CREATININE 1.5 11/02/2024 06:00 AM    GLUCOSE 231 11/02/2024 06:00 AM       Detailed results:        Thank you for allowing us to participate in the care of this patient.Please call with questions.    This note is created with the assistance of a speech recognition program.  While intending to generate adocument that actually reflects the content of the visit, the document can still have some errors including those of syntax and sound a like substitutions which may escape proof reading.  It such instances, actual meaningcan be extrapolated by contextual diversion.    Michael Gomez MD  Office: [phone]  Perfect serve / office [phone]      I have discussed the care of the patient, including pertinent history and exam findings,  with the resident., student or CNP- I have seen and examined the patient and the key elements of all parts of the encounter have been performed by me.  I have decided the assessment, plan and orders as documented by the resident.student or CNP    Bernadine Campos, Infectious Diseases   
created with the assistance of a speech recognition program.  While intending to generate adocument that actually reflects the content of the visit, the document can still have some errors including those of syntax and sound a like substitutions which may escape proof reading.  It such instances, actual meaningcan be extrapolated by contextual diversion.    Bernadine Campos MD  Office: [phone]  Perfect [facility] / office [phone]      
is 26 mmHg  (based on TR doppler only).    Image quality is adequate.       ASSESSMENT AND PLAN     PROBLEM LIST:    Patient Active Problem List   Diagnosis    Sepsis (HCC)    Acute hypoxic respiratory failure    Metabolic acidosis    Acute kidney failure (HCC)    Rhabdomyolysis    Urinary tract infection    Atrial fibrillation (HCC)    Decompensated hepatic cirrhosis (HCC)    E. coli septicemia (HCC)    Pyelonephritis    Acute encephalopathy    JUAN ANTONIO (acute kidney injury) (HCC)    History of type 2 diabetes mellitus    MONROY (nonalcoholic steatohepatitis)    Thrombocytopenia (HCC)    Cerebrovascular accident (CVA) (HCC)    Debility    Gram-neg septicemia (HCC)       ASSESSMENT:    Acute hypoxic respiratory failure, s/p extubation/improved  Urinary tract infection  E. coli bacteremia  Severe sepsis  Septic shock, resolved  Acute kidney injury, i resolved  Rhabdomyolysis  New onset A-fib, currently in sinus rhythm  Diabetic ketoacidosis, resolved  Nonalcoholic steatohepatitis  Anemia, improving  Leukocytosis, stable    PLAN:    I personally interviewed/examined the patient; reviewed interval history, interpreted all available radiographic and laboratory data at the time of service.    Patient is hemodynamically stable  Currently on room air and maintaining saturation  Encourage incentive spirometry/Acapella  Aspiration precautions  As needed bronchodilators  Antimicrobials reviewed; continue Rocephin per infectious disease  She has bilateral lower extremity edema, to follow-up intake and output and diuretic if needed per primary team   Wean off steroids she finished 10 mg and now on 5 mg for few days  Monitor intake/output  DVT and stress ulcer prophylaxis-subcutaneous heparin and Protonix  Physical/occupational therapy    Pulmonary status is stable we will sign off please call us if needed.    It was my pleasure to evaluate Claudia SCOTT Malone today. I would like to thank you for allowing me to participate in the care of 
on file   Stress: Not on file   Social Connections: Moderately Integrated (11/4/2024)    Social Connections ([facility] HRSN)     If for any reason you need help with day-to-day activities such as bathing, preparing meals, shopping, managing finances, etc., do you get the help you need?: Not on file   Intimate Partner Violence: Not on file   Housing Stability: Low Risk  (10/29/2024)    Housing Stability Vital Sign     Unable to Pay for Housing in the Last Year: No     Number of Times Moved in the Last Year: 0     Homeless in the Last Year: No       Family History:   No family history on file.   Medical Decision Making:   I have independently reviewed/ordered the following labs:    CBC with Differential:   Recent Labs     11/03/24  0549 11/04/24  0529   WBC 7.8 7.3   HGB 9.8* 9.7*   HCT 29.4* 29.6*   PLT See Reflexed IPF Result See Reflexed IPF Result   LYMPHOPCT 12* 7*   MONOPCT 4 4   EOSPCT 0* 0*     BMP:  Recent Labs     11/02/24  1635 11/03/24  0549 11/04/24  0529   NA  --  147* 149*   K 3.5* 4.0 3.4*   CL  --  112* 112*   CO2  --  26 28   BUN  --  63* 60*   CREATININE  --  1.5* 1.3*   MG 1.9  --  2.4     Hepatic Function Panel:   No results for input(s): \"LABALBU\", \"BILIDIR\", \"IBILI\", \"BILITOT\", \"ALKPHOS\", \"ALT\", \"AST\" in the last 72 hours.    Invalid input(s): \"PROT\"    No results for input(s): \"RPR\" in the last 72 hours.  No results for input(s): \"HIV\" in the last 72 hours.  No results for input(s): \"BC\" in the last 72 hours.  Lab Results   Component Value Date/Time    CREATININE 1.3 11/04/2024 05:29 AM    GLUCOSE 248 11/04/2024 05:29 AM       Detailed results:        Thank you for allowing us to participate in the care of this patient.Please call with questions.    This note is created with the assistance of a speech recognition program.  While intending to generate adocument that actually reflects the content of the visit, the document can still have some errors including those of syntax and sound a like 
  Component Value Date/Time    CREATININE 0.9 11/08/2024 06:40 AM    GLUCOSE 122 11/08/2024 06:40 AM       Detailed results:        Thank you for allowing us to participate in the care of this patient.Please call with questions.    This note is created with the assistance of a speech recognition program.  While intending to generate adocument that actually reflects the content of the visit, the document can still have some errors including those of syntax and sound a like substitutions which may escape proof reading.  It such instances, actual meaningcan be extrapolated by contextual diversion.    Flo Hoffman MD  Office: [phone]  [facility] / office [phone]        
in the last 72 hours.  Lab Results   Component Value Date/Time    CREATININE 1.2 11/05/2024 06:28 AM    GLUCOSE 178 11/05/2024 06:28 AM       Detailed results:        Thank you for allowing us to participate in the care of this patient.Please call with questions.    This note is created with the assistance of a speech recognition program.  While intending to generate adocument that actually reflects the content of the visit, the document can still have some errors including those of syntax and sound a like substitutions which may escape proof reading.  It such instances, actual meaningcan be extrapolated by contextual diversion.    Michael Gomez MD  Office: [phone]  [employer] [facility] / office [phone]      I have discussed the care of the patient, including pertinent history and exam findings,  with the resident., student or CNP- I have seen and examined the patient and the key elements of all parts of the encounter have been performed by me.  I have decided the assessment, plan and orders as documented by the resident.student or CNP    Bernadine Campos, Infectious Diseases   
ischemic ATN - resolved  Septic shock causing ischemic ATN on admission requiring 2 sessions of hemodialysis per nephrology.  Currently continued on Aldactone renal functions back at baseline.     Septic shock secondary to E. coli septicemia with metabolic encephalopathy  - resolved  Admitted with AMS secondary to septic shock from E. coli UTI along with pyelonephritis treated with ceftriaxone.  Blood pressure stable and mentation improved as shock resolved.  Continued on antibiotics ceftriaxone per ID  Continue steroid taper.    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       
on Aldactone renal functions back at baseline.     Septic shock secondary to E. coli septicemia with metabolic encephalopathy  - resolved  Admitted with AMS secondary to septic shock from E. coli UTI along with pyelonephritis treated with ceftriaxone.  Blood pressure stable and mentation improved as shock resolved.  Continued on antibiotics ceftriaxone per ID  Continue steroid taper.    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       
therapy  Discharge Planning / SW:  working on placement to SNF versus ARU        Electronically signed by   Bita Franklin MD   Internal medicine resident, PGY-3  [facility], [place], [place].  On 11/8/24 at 7:37 AM EST       
(HCC) 10/29/2024    Pyelonephritis 10/29/2024    Elevated procalcitonin 10/29/2024    Acute encephalopathy 10/29/2024    JUAN ANTONIO (acute kidney injury) (HCC) 10/29/2024    Septic shock (HCC) 10/28/2024    Acute hypoxic respiratory failure 10/28/2024    Metabolic acidosis 10/28/2024    Acute kidney failure (HCC) 10/28/2024    Lactic acidosis 10/28/2024    Hypothermia 10/28/2024    Rhabdomyolysis 10/28/2024    Urinary tract infection 10/28/2024    Atrial fibrillation (HCC) 10/28/2024    Decompensated hepatic cirrhosis (HCC) 10/28/2024          PLAN:     WEAN PER PROTOCOL:  [] No   [x] Yes  [] N/A    ICU PROPHYLAXIS:  Stress ulcer:  [x] PPI Agent  [] M1Mgsps [] Sucralfate  [] Other:  VTE:   [] Enoxaparin  [] Unfract. Heparin Subcut  [] EPC Cuffs    NUTRITION:  [x] NPO [] Tube Feeding (Specify: ) [] TPN  [] PO    HOME MEDS RECONCILED: [] No  [x] Yes    CONSULTATION NEEDED:  [x] No  [] Yes    FAMILY UPDATED:    [] No  [x] Yes    TRANSFER OUT OF ICU:   [x] No  [] Yes        Assessment / Plan:    Acute toxic metabolic encephalopathy secondary to hepatic encephalopathy and septic shock  Acute hypoxic respiratory failure secondary to AMS   Septic shock secondary to E. coli pyelonephritis  E. coli bacteremia secondary to pyelonephritis  JUAN ANTONIO of multifactorial etiology, likely secondary to decreased IABV and rhabdomyolysis  Decompensated liver cirrhosis 2/2 MONROY with esophageal varices s/p banding  Thrombocytopenia likely secondary to liver cirrhosis  New onset A-fib with RVR  DKA, resolved    Neuro:  Acute toxic metabolic encephalopathy secondary to hepatic encephalopathy and septic shock  Hyperammonemia secondary to decompensated liver cirrhosis, resolved  CT head unremarkable  UDS negative  Ammonia 72 on admission.  Continue lactulose 3 times daily  Neurochecks per protocol  Off sedation   CT head ordered    Cardiovascular:  New onset A-fib with RVR  Septic shock secondary to E. coli bacteremia 2/2 pyelonephritis  Normal 
recommendations  Downtrending CK myoglobin  Continue IV fluids  Monitor electrolytes and replace as needed  Strict I's and O's  Monitor creatinine.  Dialysis per nephrology    GI:  Decompensated liver cirrhosis 2/2 MONROY with grade 3 esophageal varices s/p banding  Dark stools as per family  Transaminitis 2/2 shock and rhabdomyolysis, improving  No black stools reported since admission  Continue IV fluids  Protonix BID   Continue lactulose TID, titrate to 2-3 BM  On tube feeds. Dietary consulted      Endocrine:  DKA secondary to medication noncompliance, resolved  Euthyroid sick syndrome  Beta-hydroxybutyrate 6.15 on admission  TSH 4.26, FT4  Off insulin drip. Monitor blood sugar.   HbA1c 11.9  On 20 units Lantus with high-dose sliding scale requiring large amounts of sliding scale likely effect of Solu-Cortef      Heme:  Thrombocytopenia likely secondary to liver cirrhosis  Platelets less than 50,000.  Received 2 U platelet transfusion on admission   Monitor and transfuse if platelets < 10,000 or if actively bleeding  Monitor H&H.  Transfuse if Hb <7    ID:  E. coli bacteremia 2/2 pyelonephritis  ID consulted  Improving leukocytosis.  Continue Rocephin x 14 days    Prophylaxis:  DVT: SCD  GI: protonix BID    Dispo:  Remain in ICU          Beatrice Tavera MD  Internal Medicine Resident, PGY-2  Critical Care Service,  [facility], [place], [place], [place].  11/2/2024 7:17 AM

## 2024-11-13 NOTE — PLAN OF CARE
Problem: Respiratory - Adult  Goal: Achieves optimal ventilation and oxygenation  10/29/2024 1929 by Geovanna Sanchez RCP  Outcome: Progressing     Problem: OXYGENATION/RESPIRATORY FUNCTION  Goal: Patient will maintain patent airway  Outcome: Ongoing  Goal: Patient will achieve/maintain normal respiratory rate/effort  Respiratory rate and effort will be within normal limits for the patient  Outcome: Ongoing    Problem: MECHANICAL VENTILATION  Goal: Patient will maintain patent airway  Outcome: Ongoing  Goal: Oral health is maintained or improved  Outcome: Ongoing  Goal: ET tube will be managed safely  Outcome: Ongoing  Goal: Ability to express needs and understand communication  Outcome: Ongoing  Goal: Mobility/activity is maintained at optimum level for patient  Outcome: Ongoing    Problem: ASPIRATION PRECAUTIONS  Goal: Patient’s risk of aspiration is minimized  Outcome: Ongoing    Problem: SKIN INTEGRITY  Goal: Skin integrity is maintained or improved  Outcome: Ongoing                    
  Problem: Respiratory - Adult  Goal: Achieves optimal ventilation and oxygenation  10/30/2024 2039 by Gale Morales RCP  Outcome: Progressing     Problem: OXYGENATION/RESPIRATORY FUNCTION  Goal: Patient will maintain patent airway  Outcome: Ongoing  Goal: Patient will achieve/maintain normal respiratory rate/effort  Respiratory rate and effort will be within normal limits for the patient  Outcome: Ongoing    Problem: MECHANICAL VENTILATION  Goal: Patient will maintain patent airway  Outcome: Ongoing  Goal: Oral health is maintained or improved  Outcome: Ongoing  Goal: ET tube will be managed safely  Outcome: Ongoing  Goal: Ability to express needs and understand communication  Outcome: Ongoing  Goal: Mobility/activity is maintained at optimum level for patient  Outcome: Ongoing    Problem: ASPIRATION PRECAUTIONS  Goal: Patient’s risk of aspiration is minimized  Outcome: Ongoing    Problem: SKIN INTEGRITY  Goal: Skin integrity is maintained or improved  Outcome: Ongoing                    
  Problem: Respiratory - Adult  Goal: Achieves optimal ventilation and oxygenation  10/31/2024 0716 by Kayla Ames RCP  Outcome: Progressing     Problem: OXYGENATION/RESPIRATORY FUNCTION  Goal: Patient will maintain patent airway  Outcome: Ongoing  Goal: Patient will achieve/maintain normal respiratory rate/effort  Respiratory rate and effort will be within normal limits for the patient  Outcome: Ongoing    Problem: MECHANICAL VENTILATION  Goal: Patient will maintain patent airway  Outcome: Ongoing  Goal: Oral health is maintained or improved  Outcome: Ongoing  Goal: ET tube will be managed safely  Outcome: Ongoing  Goal: Ability to express needs and understand communication  Outcome: Ongoing  Goal: Mobility/activity is maintained at optimum level for patient  Outcome: Ongoing    Problem: ASPIRATION PRECAUTIONS  Goal: Patient’s risk of aspiration is minimized  Outcome: Ongoing    Problem: SKIN INTEGRITY  Goal: Skin integrity is maintained or improved  Outcome: Ongoing                    
  Problem: Respiratory - Adult  Goal: Achieves optimal ventilation and oxygenation  10/31/2024 1943 by Javad Almanza RCP  Outcome: Progressing   BRONCHOSPASM/BRONCHOCONSTRICTION     [x]         IMPROVE AERATION/BREATH SOUNDS  [x]   ADMINISTER BRONCHODILATOR THERAPY AS APPROPRIATE  [x]   ASSESS BREATH SOUNDS  []   IMPLEMENT AEROSOL/MDI PROTOCOL  [x]   PATIENT EDUCATION AS NEEDED  PROVIDE ADEQUATE OXYGENATION WITH ACCEPTABLE SP02/ABG'S    [x]  IDENTIFY APPROPRIATE OXYGEN THERAPY  [x]   MONITOR SP02/ABG'S AS NEEDED   [x]   PATIENT EDUCATION AS NEEDED  MECHANICAL VENTILATION     [x]  PROVIDE OPTIMAL VENTILATION  [x]   ASSESS FOR EXTUBATION READINESS  [x]   ASSESS FOR WEANING READINESS  [x]  EXTUBATE AS TOLERATED  [x]  IMPLEMENT ADULT MECHANICAL VENTILATION PROTOCOL  [x]  MAINTAIN ADEQUATE OXYGENATION  [x]  PERFORM SPONTANEOUS WEANING TRIAL AS TOLERATED    
  Problem: Respiratory - Adult  Goal: Achieves optimal ventilation and oxygenation  11/1/2024 0709 by Kayla Ames RCP  Outcome: Progressing     Problem: OXYGENATION/RESPIRATORY FUNCTION  Goal: Patient will maintain patent airway  Outcome: Ongoing  Goal: Patient will achieve/maintain normal respiratory rate/effort  Respiratory rate and effort will be within normal limits for the patient  Outcome: Ongoing    Problem: MECHANICAL VENTILATION  Goal: Patient will maintain patent airway  Outcome: Ongoing  Goal: Oral health is maintained or improved  Outcome: Ongoing  Goal: ET tube will be managed safely  Outcome: Ongoing  Goal: Ability to express needs and understand communication  Outcome: Ongoing  Goal: Mobility/activity is maintained at optimum level for patient  Outcome: Ongoing    Problem: ASPIRATION PRECAUTIONS  Goal: Patient’s risk of aspiration is minimized  Outcome: Ongoing    Problem: SKIN INTEGRITY  Goal: Skin integrity is maintained or improved  Outcome: Ongoing                    
  Problem: Respiratory - Adult  Goal: Achieves optimal ventilation and oxygenation  11/1/2024 1946 by Javad Almanza RCP  Outcome: Progressing   BRONCHOSPASM/BRONCHOCONSTRICTION     [x]         IMPROVE AERATION/BREATH SOUNDS  [x]   ADMINISTER BRONCHODILATOR THERAPY AS APPROPRIATE  [x]   ASSESS BREATH SOUNDS  []   IMPLEMENT AEROSOL/MDI PROTOCOL  [x]   PATIENT EDUCATION AS NEEDED  PROVIDE ADEQUATE OXYGENATION WITH ACCEPTABLE SP02/ABG'S    [x]  IDENTIFY APPROPRIATE OXYGEN THERAPY  [x]   MONITOR SP02/ABG'S AS NEEDED   [x]   PATIENT EDUCATION AS NEEDED  MECHANICAL VENTILATION     [x]  PROVIDE OPTIMAL VENTILATION  [x]   ASSESS FOR EXTUBATION READINESS  [x]   ASSESS FOR WEANING READINESS  [x]  EXTUBATE AS TOLERATED  [x]  IMPLEMENT ADULT MECHANICAL VENTILATION PROTOCOL  [x]  MAINTAIN ADEQUATE OXYGENATION  [x]  PERFORM SPONTANEOUS WEANING TRIAL AS TOLERATED    
  Problem: Respiratory - Adult  Goal: Achieves optimal ventilation and oxygenation  11/2/2024 1952 by Javad Almanza RCP  Outcome: Progressing    PROVIDE ADEQUATE OXYGENATION WITH ACCEPTABLE SP02/ABG'S    [x]  IDENTIFY APPROPRIATE OXYGEN THERAPY  [x]   MONITOR SP02/ABG'S AS NEEDED   [x]   PATIENT EDUCATION AS NEEDED  MECHANICAL VENTILATION     [x]  PROVIDE OPTIMAL VENTILATION  [x]   ASSESS FOR EXTUBATION READINESS  [x]   ASSESS FOR WEANING READINESS  [x]  EXTUBATE AS TOLERATED  [x]  IMPLEMENT ADULT MECHANICAL VENTILATION PROTOCOL  [x]  MAINTAIN ADEQUATE OXYGENATION  [x]  PERFORM SPONTANEOUS WEANING TRIAL AS TOLERATED    
  Problem: Respiratory - Adult  Goal: Achieves optimal ventilation and oxygenation  11/3/2024 1926 by Geovanna Sanchez RCP  Outcome: Progressing     Problem: OXYGENATION/RESPIRATORY FUNCTION  Goal: Patient will maintain patent airway  Outcome: Ongoing  Goal: Patient will achieve/maintain normal respiratory rate/effort  Respiratory rate and effort will be within normal limits for the patient  Outcome: Ongoing    Problem: MECHANICAL VENTILATION  Goal: Patient will maintain patent airway  Outcome: Ongoing  Goal: Oral health is maintained or improved  Outcome: Ongoing  Goal: ET tube will be managed safely  Outcome: Ongoing  Goal: Ability to express needs and understand communication  Outcome: Ongoing  Goal: Mobility/activity is maintained at optimum level for patient  Outcome: Ongoing    Problem: ASPIRATION PRECAUTIONS  Goal: Patient’s risk of aspiration is minimized  Outcome: Ongoing    Problem: SKIN INTEGRITY  Goal: Skin integrity is maintained or improved  Outcome: Ongoing                    
  Problem: Respiratory - Adult  Goal: Achieves optimal ventilation and oxygenation  Outcome: Progressing     
  Problem: Safety - Adult  Goal: Free from fall injury  10/30/2024 0627 by Abbie Sotomayor RN  Outcome: Progressing  10/29/2024 1702 by Natalie Ramirez RN  Outcome: Progressing     Problem: Discharge Planning  Goal: Discharge to home or other facility with appropriate resources  10/30/2024 0627 by Abbie Sotomayor RN  Outcome: Progressing  10/29/2024 1702 by Natalie Ramirez RN  Outcome: Progressing     Problem: Skin/Tissue Integrity  Goal: Absence of new skin breakdown  Description: 1.  Monitor for areas of redness and/or skin breakdown  2.  Assess vascular access sites hourly  3.  Every 4-6 hours minimum:  Change oxygen saturation probe site  4.  Every 4-6 hours:  If on nasal continuous positive airway pressure, respiratory therapy assess nares and determine need for appliance change or resting period.  10/30/2024 0627 by Abbie Sotomayor RN  Outcome: Progressing  10/29/2024 1702 by Natalie Ramirez RN  Outcome: Progressing     Problem: ABCDS Injury Assessment  Goal: Absence of physical injury  10/30/2024 0627 by Abbie Sotomayor RN  Outcome: Progressing  10/29/2024 1702 by Natalie Ramirez RN  Outcome: Progressing     Problem: Respiratory - Adult  Goal: Achieves optimal ventilation and oxygenation  10/30/2024 0627 by Abbie Sotomayor RN  Outcome: Progressing  10/29/2024 1929 by Geovanna Sanchez RCP  Outcome: Progressing  10/29/2024 1702 by Natalie Ramirez RN  Outcome: Progressing     Problem: Pain  Goal: Verbalizes/displays adequate comfort level or baseline comfort level  10/30/2024 0627 by Abbie Sotomayor RN  Outcome: Progressing  10/29/2024 1702 by Natalie Ramirez RN  Outcome: Progressing     Problem: Nutrition Deficit:  Goal: Optimize nutritional status  10/30/2024 0627 by Abbie Sotomayor RN  Outcome: Progressing  10/29/2024 1702 by Natalie Ramirez RN  Outcome: Progressing     Problem: Safety - Medical Restraint  Goal: Remains free of injury from restraints (Restraint for Interference 
  Problem: Safety - Adult  Goal: Free from fall injury  10/30/2024 0823 by Sabi Berry RN  Outcome: Progressing     Problem: Discharge Planning  Goal: Discharge to home or other facility with appropriate resources  10/30/2024 0823 by Sabi Berry RN  Outcome: Progressing     Problem: Skin/Tissue Integrity  Goal: Absence of new skin breakdown  Description: 1.  Monitor for areas of redness and/or skin breakdown  2.  Assess vascular access sites hourly  3.  Every 4-6 hours minimum:  Change oxygen saturation probe site  4.  Every 4-6 hours:  If on nasal continuous positive airway pressure, respiratory therapy assess nares and determine need for appliance change or resting period.  10/30/2024 0823 by Sabi Berry RN  Outcome: Progressing     Problem: ABCDS Injury Assessment  Goal: Absence of physical injury  10/30/2024 0823 by Sabi Berry RN  Outcome: Progressing     Problem: Respiratory - Adult  Goal: Achieves optimal ventilation and oxygenation  10/30/2024 0823 by Sabi Berry RN  Outcome: Progressing     Problem: Pain  Goal: Verbalizes/displays adequate comfort level or baseline comfort level  10/30/2024 0823 by Sabi Berry RN  Outcome: Progressing     Problem: Nutrition Deficit:  Goal: Optimize nutritional status  10/30/2024 0823 by Sabi Berry RN  Outcome: Progressing     Problem: Safety - Medical Restraint  Goal: Remains free of injury from restraints (Restraint for Interference with Medical Device)  Description: INTERVENTIONS:  1. Determine that other, less restrictive measures have been tried or would not be effective before applying the restraint  2. Evaluate the patient's condition at the time of restraint application  3. Inform patient/family regarding the reason for restraint  4. Q2H: Monitor safety, psychosocial status, comfort, nutrition and hydration  10/30/2024 0823 by Sabi Berry RN  Outcome: Progressing     Problem: Confusion  Goal: Confusion, delirium, dementia, or 
  Problem: Safety - Adult  Goal: Free from fall injury  11/12/2024 2114 by Do Ghotra RN  Outcome: Progressing  11/12/2024 1017 by Chadwick Hurley RN  Outcome: Progressing     Problem: Discharge Planning  Goal: Discharge to home or other facility with appropriate resources  11/12/2024 2114 by Do Ghotra RN  Outcome: Progressing  Flowsheets (Taken 11/12/2024 1945)  Discharge to home or other facility with appropriate resources: Identify barriers to discharge with patient and caregiver  11/12/2024 1017 by Chadwick Hurley RN  Outcome: Progressing     Problem: Skin/Tissue Integrity  Goal: Absence of new skin breakdown  Description: 1.  Monitor for areas of redness and/or skin breakdown  2.  Assess vascular access sites hourly  3.  Every 4-6 hours minimum:  Change oxygen saturation probe site  4.  Every 4-6 hours:  If on nasal continuous positive airway pressure, respiratory therapy assess nares and determine need for appliance change or resting period.  11/12/2024 2114 by Do Ghotra RN  Outcome: Progressing  11/12/2024 1017 by Chadwick Hurley RN  Outcome: Progressing     Problem: ABCDS Injury Assessment  Goal: Absence of physical injury  11/12/2024 2114 by Do Ghotra RN  Outcome: Progressing  Flowsheets (Taken 11/12/2024 2113)  Absence of Physical Injury: Implement safety measures based on patient assessment  11/12/2024 1017 by Chadwick Hurley RN  Outcome: Progressing     Problem: Respiratory - Adult  Goal: Achieves optimal ventilation and oxygenation  11/12/2024 2114 by Do Ghotra RN  Outcome: Progressing  Flowsheets (Taken 11/12/2024 1945)  Achieves optimal ventilation and oxygenation:   Assess for changes in respiratory status   Assess for changes in mentation and behavior   Position to facilitate oxygenation and minimize respiratory effort   Oxygen supplementation based on oxygen saturation or arterial blood gases   Encourage broncho-pulmonary hygiene including cough, deep 
  Problem: Safety - Adult  Goal: Free from fall injury  11/4/2024 1841 by Chencho Montes De Oca RN  Outcome: Progressing  11/4/2024 0558 by Gladys German RN  Outcome: Progressing     Problem: Discharge Planning  Goal: Discharge to home or other facility with appropriate resources  11/4/2024 1841 by Chencho Montes De Oca RN  Outcome: Progressing  11/4/2024 0558 by Gladys German RN  Outcome: Progressing     Problem: Skin/Tissue Integrity  Goal: Absence of new skin breakdown  Description: 1.  Monitor for areas of redness and/or skin breakdown  2.  Assess vascular access sites hourly  3.  Every 4-6 hours minimum:  Change oxygen saturation probe site  4.  Every 4-6 hours:  If on nasal continuous positive airway pressure, respiratory therapy assess nares and determine need for appliance change or resting period.  11/4/2024 1841 by Chencho Montes De Oca RN  Outcome: Progressing  11/4/2024 0558 by Gladys German RN  Outcome: Progressing     Problem: ABCDS Injury Assessment  Goal: Absence of physical injury  11/4/2024 1841 by Chencho Montes De Oca RN  Outcome: Progressing  11/4/2024 0558 by Gladys German RN  Outcome: Progressing     Problem: Respiratory - Adult  Goal: Achieves optimal ventilation and oxygenation  11/4/2024 1841 by Chencho Montes De Oca RN  Outcome: Progressing  Flowsheets (Taken 11/4/2024 0759 by Ginna Strong [facility])  Achieves optimal ventilation and oxygenation:   Assess for changes in respiratory status   Assess for changes in mentation and behavior   Position to facilitate oxygenation and minimize respiratory effort   Oxygen supplementation based on oxygen saturation or arterial blood gases   Assess the need for suctioning and aspirate as needed   Respiratory therapy support as indicated  11/4/2024 0558 by Gladys German RN  Outcome: Progressing     Problem: Pain  Goal: Verbalizes/displays adequate comfort level or baseline comfort level  11/4/2024 1841 by Chencho Montes De Oca RN  Outcome: Progressing  11/4/2024 0558 by Gladys German 
  Problem: Safety - Adult  Goal: Free from fall injury  11/5/2024 1036 by Herminia Avila RN  Outcome: Progressing  11/4/2024 2116 by Dia Hernandez RN  Outcome: Progressing  Flowsheets (Taken 11/4/2024 2113)  Free From Fall Injury: Based on caregiver fall risk screen, instruct family/caregiver to ask for assistance with transferring infant if caregiver noted to have fall risk factors     Problem: Discharge Planning  Goal: Discharge to home or other facility with appropriate resources  11/5/2024 1036 by Herminia Avila RN  Outcome: Progressing  11/4/2024 2116 by Dia Hernandez RN  Outcome: Progressing     Problem: Skin/Tissue Integrity  Goal: Absence of new skin breakdown  Description: 1.  Monitor for areas of redness and/or skin breakdown  2.  Assess vascular access sites hourly  3.  Every 4-6 hours minimum:  Change oxygen saturation probe site  4.  Every 4-6 hours:  If on nasal continuous positive airway pressure, respiratory therapy assess nares and determine need for appliance change or resting period.  11/5/2024 1036 by Herminia Avila RN  Outcome: Progressing  11/4/2024 2116 by Dia Hernandez RN  Outcome: Progressing     Problem: ABCDS Injury Assessment  Goal: Absence of physical injury  11/5/2024 1036 by Herminia Avila RN  Outcome: Progressing  Flowsheets (Taken 11/5/2024 1032)  Absence of Physical Injury: Implement safety measures based on patient assessment  11/4/2024 2116 by Dai Hernandez RN  Outcome: Progressing  Flowsheets (Taken 11/4/2024 2113)  Absence of Physical Injury: Implement safety measures based on patient assessment     Problem: Respiratory - Adult  Goal: Achieves optimal ventilation and oxygenation  11/5/2024 1036 by Herminia Avila RN  Outcome: Progressing  11/4/2024 2116 by Dia Hernandez RN  Outcome: Progressing     Problem: Pain  Goal: Verbalizes/displays adequate comfort level or baseline comfort 
  Problem: Safety - Adult  Goal: Free from fall injury  11/7/2024 1238 by Wendie Boyer RN  Outcome: Progressing  11/7/2024 0329 by Soniya Cerda RN  Outcome: Progressing     Problem: Discharge Planning  Goal: Discharge to home or other facility with appropriate resources  11/7/2024 1238 by Wendie Boyer RN  Outcome: Progressing  11/7/2024 0329 by Soniya Cerda RN  Outcome: Progressing     Problem: Skin/Tissue Integrity  Goal: Absence of new skin breakdown  Description: 1.  Monitor for areas of redness and/or skin breakdown  2.  Assess vascular access sites hourly  3.  Every 4-6 hours minimum:  Change oxygen saturation probe site  4.  Every 4-6 hours:  If on nasal continuous positive airway pressure, respiratory therapy assess nares and determine need for appliance change or resting period.  11/7/2024 1238 by Wendie Boyer RN  Outcome: Progressing  11/7/2024 0329 by Soniya Cerda RN  Outcome: Progressing     Problem: ABCDS Injury Assessment  Goal: Absence of physical injury  11/7/2024 1238 by Wendie Boyer RN  Outcome: Progressing  11/7/2024 0329 by Soniya Cerda RN  Outcome: Progressing     Problem: Respiratory - Adult  Goal: Achieves optimal ventilation and oxygenation  11/7/2024 1238 by Wendie Boyer RN  Outcome: Progressing  11/7/2024 0329 by Soniya Cerda RN  Outcome: Progressing     Problem: Pain  Goal: Verbalizes/displays adequate comfort level or baseline comfort level  11/7/2024 1238 by Wendie Boyer RN  Outcome: Progressing  11/7/2024 0329 by Soniya Cerda RN  Outcome: Progressing     Problem: Nutrition Deficit:  Goal: Optimize nutritional status  11/7/2024 1238 by Wendie Boyer RN  Outcome: Progressing  11/7/2024 0329 by Soniya Cerda RN  Outcome: Progressing     Problem: Confusion  Goal: Confusion, delirium, dementia, or psychosis is improved or at baseline  Description: INTERVENTIONS:  1. Assess for possible contributors to thought disturbance, including medications, impaired vision 
  Problem: Safety - Adult  Goal: Free from fall injury  11/8/2024 0132 by Cheri Davies RN  Outcome: Progressing  Flowsheets (Taken 11/4/2024 2113 by Dia Hernandez, RN)  Free From Fall Injury: Based on caregiver fall risk screen, instruct family/caregiver to ask for assistance with transferring infant if caregiver noted to have fall risk factors  11/7/2024 1238 by Wendie Boyer RN  Outcome: Progressing     Problem: Discharge Planning  Goal: Discharge to home or other facility with appropriate resources  11/8/2024 0132 by Cheri Davies RN  Outcome: Progressing  Flowsheets (Taken 11/8/2024 0132)  Discharge to home or other facility with appropriate resources:   Identify barriers to discharge with patient and caregiver   Arrange for needed discharge resources and transportation as appropriate  11/7/2024 1238 by Wendie Boyer RN  Outcome: Progressing     Problem: Skin/Tissue Integrity  Goal: Absence of new skin breakdown  Description: 1.  Monitor for areas of redness and/or skin breakdown  2.  Assess vascular access sites hourly  3.  Every 4-6 hours minimum:  Change oxygen saturation probe site  4.  Every 4-6 hours:  If on nasal continuous positive airway pressure, respiratory therapy assess nares and determine need for appliance change or resting period.  11/8/2024 0132 by Cheri Davies RN  Outcome: Progressing  11/7/2024 1238 by Wendie Boyer RN  Outcome: Progressing     Problem: ABCDS Injury Assessment  Goal: Absence of physical injury  11/8/2024 0132 by Cheri Davies RN  Outcome: Progressing  Flowsheets (Taken 11/5/2024 1032 by Herminia Avila RN)  Absence of Physical Injury: Implement safety measures based on patient assessment  11/7/2024 1238 by Wendie Boyer RN  Outcome: Progressing     Problem: Respiratory - Adult  Goal: Achieves optimal ventilation and oxygenation  11/8/2024 0132 by Cheri Davies RN  Outcome: Progressing  11/7/2024 1238 by Wendie Boyer RN  Outcome: Progressing   
  Problem: Safety - Adult  Goal: Free from fall injury  Outcome: Progressing     Problem: Discharge Planning  Goal: Discharge to home or other facility with appropriate resources  Outcome: Progressing     Problem: Skin/Tissue Integrity  Goal: Absence of new skin breakdown  Description: 1.  Monitor for areas of redness and/or skin breakdown  2.  Assess vascular access sites hourly  3.  Every 4-6 hours minimum:  Change oxygen saturation probe site  4.  Every 4-6 hours:  If on nasal continuous positive airway pressure, respiratory therapy assess nares and determine need for appliance change or resting period.  Outcome: Progressing     Problem: ABCDS Injury Assessment  Goal: Absence of physical injury  Outcome: Progressing     Problem: Respiratory - Adult  Goal: Achieves optimal ventilation and oxygenation  10/29/2024 1702 by Natalie Ramirez RN  Outcome: Progressing  10/29/2024 0810 by Kinza De La Fuente RCP  Outcome: Progressing     Problem: Pain  Goal: Verbalizes/displays adequate comfort level or baseline comfort level  Outcome: Progressing     Problem: Nutrition Deficit:  Goal: Optimize nutritional status  Outcome: Progressing     
  Problem: Safety - Adult  Goal: Free from fall injury  Outcome: Progressing     Problem: Discharge Planning  Goal: Discharge to home or other facility with appropriate resources  Outcome: Progressing     Problem: Skin/Tissue Integrity  Goal: Absence of new skin breakdown  Description: 1.  Monitor for areas of redness and/or skin breakdown  2.  Assess vascular access sites hourly  3.  Every 4-6 hours minimum:  Change oxygen saturation probe site  4.  Every 4-6 hours:  If on nasal continuous positive airway pressure, respiratory therapy assess nares and determine need for appliance change or resting period.  Outcome: Progressing     Problem: ABCDS Injury Assessment  Goal: Absence of physical injury  Outcome: Progressing     Problem: Respiratory - Adult  Goal: Achieves optimal ventilation and oxygenation  10/31/2024 0721 by Abbie Sotomayor RN  Outcome: Progressing  10/31/2024 0716 by Kayla Ames RCP  Outcome: Progressing  10/30/2024 2039 by Gale Morales RCP  Outcome: Progressing     Problem: Pain  Goal: Verbalizes/displays adequate comfort level or baseline comfort level  Outcome: Progressing     Problem: Nutrition Deficit:  Goal: Optimize nutritional status  Outcome: Progressing     Problem: Safety - Medical Restraint  Goal: Remains free of injury from restraints (Restraint for Interference with Medical Device)  Description: INTERVENTIONS:  1. Determine that other, less restrictive measures have been tried or would not be effective before applying the restraint  2. Evaluate the patient's condition at the time of restraint application  3. Inform patient/family regarding the reason for restraint  4. Q2H: Monitor safety, psychosocial status, comfort, nutrition and hydration  Outcome: Progressing  Flowsheets  Taken 10/31/2024 0600 by Abbie Sotomayor RN  Remains free of injury from restraints (restraint for interference with medical device):   Determine that other, less restrictive measures have been tried or 
  Problem: Safety - Adult  Goal: Free from fall injury  Outcome: Progressing     Problem: Discharge Planning  Goal: Discharge to home or other facility with appropriate resources  Outcome: Progressing     Problem: Skin/Tissue Integrity  Goal: Absence of new skin breakdown  Description: 1.  Monitor for areas of redness and/or skin breakdown  2.  Assess vascular access sites hourly  3.  Every 4-6 hours minimum:  Change oxygen saturation probe site  4.  Every 4-6 hours:  If on nasal continuous positive airway pressure, respiratory therapy assess nares and determine need for appliance change or resting period.  Outcome: Progressing     Problem: ABCDS Injury Assessment  Goal: Absence of physical injury  Outcome: Progressing     Problem: Respiratory - Adult  Goal: Achieves optimal ventilation and oxygenation  11/2/2024 0656 by Gladys German RN  Outcome: Progressing    Problem: Pain  Goal: Verbalizes/displays adequate comfort level or baseline comfort level  Outcome: Progressing     Problem: Nutrition Deficit:  Goal: Optimize nutritional status  Outcome: Progressing     Problem: Safety - Medical Restraint  Goal: Remains free of injury from restraints (Restraint for Interference with Medical Device)  Description: INTERVENTIONS:  1. Determine that other, less restrictive measures have been tried or would not be effective before applying the restraint  2. Evaluate the patient's condition at the time of restraint application  3. Inform patient/family regarding the reason for restraint  4. Q2H: Monitor safety, psychosocial status, comfort, nutrition and hydration  Outcome: Progressing     Problem: Confusion  Goal: Confusion, delirium, dementia, or psychosis is improved or at baseline  Description: INTERVENTIONS:  1. Assess for possible contributors to thought disturbance, including medications, impaired vision or hearing, underlying metabolic abnormalities, dehydration, psychiatric diagnoses, and notify attending LIP  2. 
  Problem: Safety - Adult  Goal: Free from fall injury  Outcome: Progressing     Problem: Discharge Planning  Goal: Discharge to home or other facility with appropriate resources  Outcome: Progressing     Problem: Skin/Tissue Integrity  Goal: Absence of new skin breakdown  Description: 1.  Monitor for areas of redness and/or skin breakdown  2.  Assess vascular access sites hourly  3.  Every 4-6 hours minimum:  Change oxygen saturation probe site  4.  Every 4-6 hours:  If on nasal continuous positive airway pressure, respiratory therapy assess nares and determine need for appliance change or resting period.  Outcome: Progressing     Problem: ABCDS Injury Assessment  Goal: Absence of physical injury  Outcome: Progressing     Problem: Respiratory - Adult  Goal: Achieves optimal ventilation and oxygenation  11/3/2024 0558 by Abbie Sotomayor RN  Outcome: Progressing  11/2/2024 1952 by Javad Almanza RCP  Outcome: Progressing     Problem: Pain  Goal: Verbalizes/displays adequate comfort level or baseline comfort level  Outcome: Progressing     Problem: Nutrition Deficit:  Goal: Optimize nutritional status  Outcome: Progressing     Problem: Safety - Medical Restraint  Goal: Remains free of injury from restraints (Restraint for Interference with Medical Device)  Description: INTERVENTIONS:  1. Determine that other, less restrictive measures have been tried or would not be effective before applying the restraint  2. Evaluate the patient's condition at the time of restraint application  3. Inform patient/family regarding the reason for restraint  4. Q2H: Monitor safety, psychosocial status, comfort, nutrition and hydration  Outcome: Progressing  Flowsheets  Taken 11/3/2024 0400 by Abbie Sotomayor RN  Remains free of injury from restraints (restraint for interference with medical device):   Determine that other, less restrictive measures have been tried or would not be effective before applying the restraint   Evaluate 
  Problem: Safety - Adult  Goal: Free from fall injury  Outcome: Progressing     Problem: Discharge Planning  Goal: Discharge to home or other facility with appropriate resources  Outcome: Progressing     Problem: Skin/Tissue Integrity  Goal: Absence of new skin breakdown  Description: 1.  Monitor for areas of redness and/or skin breakdown  2.  Assess vascular access sites hourly  3.  Every 4-6 hours minimum:  Change oxygen saturation probe site  4.  Every 4-6 hours:  If on nasal continuous positive airway pressure, respiratory therapy assess nares and determine need for appliance change or resting period.  Outcome: Progressing     Problem: ABCDS Injury Assessment  Goal: Absence of physical injury  Outcome: Progressing     Problem: Respiratory - Adult  Goal: Achieves optimal ventilation and oxygenation  Outcome: Progressing     Problem: Pain  Goal: Verbalizes/displays adequate comfort level or baseline comfort level  Outcome: Progressing     Problem: Nutrition Deficit:  Goal: Optimize nutritional status  Outcome: Progressing     Problem: Confusion  Goal: Confusion, delirium, dementia, or psychosis is improved or at baseline  Description: INTERVENTIONS:  1. Assess for possible contributors to thought disturbance, including medications, impaired vision or hearing, underlying metabolic abnormalities, dehydration, psychiatric diagnoses, and notify attending LIP  2. [place] high risk fall precautions, as indicated  3. Provide frequent short contacts to provide reality reorientation, refocusing and direction  4. Decrease environmental stimuli, including noise as appropriate  5. Monitor and intervene to maintain adequate nutrition, hydration, elimination, sleep and activity  6. If unable to ensure safety without constant attention obtain sitter and review sitter guidelines with assigned personnel  7. Initiate Psychosocial CNS and Spiritual Care consult, as indicated  Outcome: Progressing     
  Problem: Safety - Adult  Goal: Free from fall injury  Outcome: Progressing     Problem: Discharge Planning  Goal: Discharge to home or other facility with appropriate resources  Outcome: Progressing     Problem: Skin/Tissue Integrity  Goal: Absence of new skin breakdown  Description: 1.  Monitor for areas of redness and/or skin breakdown  2.  Assess vascular access sites hourly  3.  Every 4-6 hours minimum:  Change oxygen saturation probe site  4.  Every 4-6 hours:  If on nasal continuous positive airway pressure, respiratory therapy assess nares and determine need for appliance change or resting period.  Outcome: Progressing     Problem: ABCDS Injury Assessment  Goal: Absence of physical injury  Outcome: Progressing     Problem: Respiratory - Adult  Goal: Achieves optimal ventilation and oxygenation  Outcome: Progressing     Problem: Pain  Goal: Verbalizes/displays adequate comfort level or baseline comfort level  Outcome: Progressing     Problem: Nutrition Deficit:  Goal: Optimize nutritional status  Outcome: Progressing     Problem: Confusion  Goal: Confusion, delirium, dementia, or psychosis is improved or at baseline  Description: INTERVENTIONS:  1. Assess for possible contributors to thought disturbance, including medications, impaired vision or hearing, underlying metabolic abnormalities, dehydration, psychiatric diagnoses, and notify attending LIP  2. [place] high risk fall precautions, as indicated  3. Provide frequent short contacts to provide reality reorientation, refocusing and direction  4. Decrease environmental stimuli, including noise as appropriate  5. Monitor and intervene to maintain adequate nutrition, hydration, elimination, sleep and activity  6. If unable to ensure safety without constant attention obtain sitter and review sitter guidelines with assigned personnel  7. Initiate Psychosocial CNS and Spiritual Care consult, as indicated  Outcome: Progressing     
  Problem: Safety - Adult  Goal: Free from fall injury  Outcome: Progressing     Problem: Discharge Planning  Goal: Discharge to home or other facility with appropriate resources  Outcome: Progressing     Problem: Skin/Tissue Integrity  Goal: Absence of new skin breakdown  Description: 1.  Monitor for areas of redness and/or skin breakdown  2.  Assess vascular access sites hourly  3.  Every 4-6 hours minimum:  Change oxygen saturation probe site  4.  Every 4-6 hours:  If on nasal continuous positive airway pressure, respiratory therapy assess nares and determine need for appliance change or resting period.  Outcome: Progressing     Problem: ABCDS Injury Assessment  Goal: Absence of physical injury  Outcome: Progressing     Problem: Respiratory - Adult  Goal: Achieves optimal ventilation and oxygenation  Outcome: Progressing     Problem: Pain  Goal: Verbalizes/displays adequate comfort level or baseline comfort level  Outcome: Progressing     Problem: Nutrition Deficit:  Goal: Optimize nutritional status  Outcome: Progressing     Problem: Confusion  Goal: Confusion, delirium, dementia, or psychosis is improved or at baseline  Description: INTERVENTIONS:  1. Assess for possible contributors to thought disturbance, including medications, impaired vision or hearing, underlying metabolic abnormalities, dehydration, psychiatric diagnoses, and notify attending LIP  2. [place] high risk fall precautions, as indicated  3. Provide frequent short contacts to provide reality reorientation, refocusing and direction  4. Decrease environmental stimuli, including noise as appropriate  5. Monitor and intervene to maintain adequate nutrition, hydration, elimination, sleep and activity  6. If unable to ensure safety without constant attention obtain sitter and review sitter guidelines with assigned personnel  7. Initiate Psychosocial CNS and Spiritual Care consult, as indicated  Outcome: Progressing     
  Problem: Safety - Adult  Goal: Free from fall injury  Outcome: Progressing     Problem: Discharge Planning  Goal: Discharge to home or other facility with appropriate resources  Outcome: Progressing  Flowsheets (Taken 11/11/2024 1945)  Discharge to home or other facility with appropriate resources: Identify barriers to discharge with patient and caregiver     Problem: Skin/Tissue Integrity  Goal: Absence of new skin breakdown  Description: 1.  Monitor for areas of redness and/or skin breakdown  2.  Assess vascular access sites hourly  3.  Every 4-6 hours minimum:  Change oxygen saturation probe site  4.  Every 4-6 hours:  If on nasal continuous positive airway pressure, respiratory therapy assess nares and determine need for appliance change or resting period.  Outcome: Progressing     Problem: ABCDS Injury Assessment  Goal: Absence of physical injury  Outcome: Progressing  Flowsheets (Taken 11/11/2024 2228)  Absence of Physical Injury: Implement safety measures based on patient assessment     Problem: Respiratory - Adult  Goal: Achieves optimal ventilation and oxygenation  Outcome: Progressing  Flowsheets (Taken 11/11/2024 1945)  Achieves optimal ventilation and oxygenation:   Assess for changes in respiratory status   Assess for changes in mentation and behavior   Position to facilitate oxygenation and minimize respiratory effort   Oxygen supplementation based on oxygen saturation or arterial blood gases   Encourage broncho-pulmonary hygiene including cough, deep breathe, incentive spirometry   Assess the need for suctioning and aspirate as needed   Assess and instruct to report shortness of breath or any respiratory difficulty   Respiratory therapy support as indicated     Problem: Pain  Goal: Verbalizes/displays adequate comfort level or baseline comfort level  Outcome: Progressing     Problem: Nutrition Deficit:  Goal: Optimize nutritional status  11/11/2024 2229 by Do Ghotra, RN  Outcome: 
  Problem: Safety - Medical Restraint  Goal: Remains free of injury from restraints (Restraint for Interference with Medical Device)  Description: INTERVENTIONS:  1. Determine that other, less restrictive measures have been tried or would not be effective before applying the restraint  2. Evaluate the patient's condition at the time of restraint application  3. Inform patient/family regarding the reason for restraint  4. Q2H: Monitor safety, psychosocial status, comfort, nutrition and hydration  Outcome: Completed  Flowsheets  Taken 10/28/2024 2146 by Angel Clark RN  Remains free of injury from restraints (restraint for interference with medical device):   Determine that other, less restrictive measures have been tried or would not be effective before applying the restraint   Evaluate the patient's condition at the time of restraint application   Inform patient/family regarding the reason for restraint   Every 2 hours: Monitor safety, psychosocial status, comfort, nutrition and hydration  Taken 10/28/2024 2105 by Angel Clark RN  Remains free of injury from restraints (restraint for interference with medical device):   Determine that other, less restrictive measures have been tried or would not be effective before applying the restraint   Evaluate the patient's condition at the time of restraint application   Inform patient/family regarding the reason for restraint   Every 2 hours: Monitor safety, psychosocial status, comfort, nutrition and hydration     
  Problem: Safety - Medical Restraint  Goal: Remains free of injury from restraints (Restraint for Interference with Medical Device)  Description: INTERVENTIONS:  1. Determine that other, less restrictive measures have been tried or would not be effective before applying the restraint  2. Evaluate the patient's condition at the time of restraint application  3. Inform patient/family regarding the reason for restraint  4. Q2H: Monitor safety, psychosocial status, comfort, nutrition and hydration  Recent Flowsheet Documentation  Taken 10/31/2024 1000 by Herminia Avila RN  Remains free of injury from restraints (restraint for interference with medical device):   Determine that other, less restrictive measures have been tried or would not be effective before applying the restraint   Evaluate the patient's condition at the time of restraint application  Taken 10/31/2024 0800 by Herminia Avila RN  Remains free of injury from restraints (restraint for interference with medical device):   Determine that other, less restrictive measures have been tried or would not be effective before applying the restraint   Evaluate the patient's condition at the time of restraint application  Taken 10/31/2024 0600 by Abbie Sotomayor RN  Remains free of injury from restraints (restraint for interference with medical device):   Determine that other, less restrictive measures have been tried or would not be effective before applying the restraint   Evaluate the patient's condition at the time of restraint application   Inform patient/family regarding the reason for restraint   Every 2 hours: Monitor safety, psychosocial status, comfort, nutrition and hydration  Taken 10/31/2024 0400 by Abbie Sotomayor RN  Remains free of injury from restraints (restraint for interference with medical device):   Determine that other, less restrictive measures have been tried or would not be effective before applying the restraint   
  Problem: Safety - Medical Restraint  Goal: Remains free of injury from restraints (Restraint for Interference with Medical Device)  Description: INTERVENTIONS:  1. Determine that other, less restrictive measures have been tried or would not be effective before applying the restraint  2. Evaluate the patient's condition at the time of restraint application  3. Inform patient/family regarding the reason for restraint  4. Q2H: Monitor safety, psychosocial status, comfort, nutrition and hydration  Recent Flowsheet Documentation  Taken 11/1/2024 1200 by Herminia Avila RN  Remains free of injury from restraints (restraint for interference with medical device):   Determine that other, less restrictive measures have been tried or would not be effective before applying the restraint   Evaluate the patient's condition at the time of restraint application  Taken 11/1/2024 1000 by Herminia Avila RN  Remains free of injury from restraints (restraint for interference with medical device):   Every 2 hours: Monitor safety, psychosocial status, comfort, nutrition and hydration   Evaluate the patient's condition at the time of restraint application  Taken 11/1/2024 0800 by Herminia Avila RN  Remains free of injury from restraints (restraint for interference with medical device):   Every 2 hours: Monitor safety, psychosocial status, comfort, nutrition and hydration   Determine that other, less restrictive measures have been tried or would not be effective before applying the restraint     Problem: Safety - Adult  Goal: Free from fall injury  Outcome: Progressing     Problem: Discharge Planning  Goal: Discharge to home or other facility with appropriate resources  Outcome: Progressing     Problem: Skin/Tissue Integrity  Goal: Absence of new skin breakdown  Description: 1.  Monitor for areas of redness and/or skin breakdown  2.  Assess vascular access sites hourly  3.  Every 4-6 hours minimum:  Change 
  Problem: Safety - Medical Restraint  Goal: Remains free of injury from restraints (Restraint for Interference with Medical Device)  Description: INTERVENTIONS:  1. Determine that other, less restrictive measures have been tried or would not be effective before applying the restraint  2. Evaluate the patient's condition at the time of restraint application  3. Inform patient/family regarding the reason for restraint  4. Q2H: Monitor safety, psychosocial status, comfort, nutrition and hydration  Recent Flowsheet Documentation  Taken 11/2/2024 1000 by Herminia Avila RN  Remains free of injury from restraints (restraint for interference with medical device):   Determine that other, less restrictive measures have been tried or would not be effective before applying the restraint   Evaluate the patient's condition at the time of restraint application  Taken 11/2/2024 0800 by Herminia Avila RN  Remains free of injury from restraints (restraint for interference with medical device):   Every 2 hours: Monitor safety, psychosocial status, comfort, nutrition and hydration   Evaluate the patient's condition at the time of restraint application     Problem: Discharge Planning  Goal: Discharge to home or other facility with appropriate resources  11/2/2024 1146 by Herminia Avila RN  Outcome: Progressing  11/2/2024 0656 by Gladys German RN  Outcome: Progressing     Problem: Skin/Tissue Integrity  Goal: Absence of new skin breakdown  Description: 1.  Monitor for areas of redness and/or skin breakdown  2.  Assess vascular access sites hourly  3.  Every 4-6 hours minimum:  Change oxygen saturation probe site  4.  Every 4-6 hours:  If on nasal continuous positive airway pressure, respiratory therapy assess nares and determine need for appliance change or resting period.  11/2/2024 1146 by Herminia Avila RN  Outcome: Progressing  11/2/2024 0656 by Gladys German RN  Outcome: Progressing   
I had a discussion with the family and daughter, Aida Barronkes the HC POA and updated the family about her current condition. She wants the patient to be FULL CODE  at this point.    Heather Arredondo MD  Internal Medicine Resident, PGY-2  [place], [place]  10/28/2024,11:45 PM    
Oxygen supplementation based on oxygen saturation or arterial blood gases   Assess the need for suctioning and aspirate as needed   Respiratory therapy support as indicated     Problem: Pain  Goal: Verbalizes/displays adequate comfort level or baseline comfort level  11/4/2024 2116 by Dia Hernandez RN  Outcome: Progressing  11/4/2024 1841 by Chencho Montes De Oca RN  Outcome: Progressing     Problem: Nutrition Deficit:  Goal: Optimize nutritional status  11/4/2024 2116 by Dia Hernandez RN  Outcome: Progressing  11/4/2024 1841 by Chencho Montes De Oca RN  Outcome: Progressing     Problem: Confusion  Goal: Confusion, delirium, dementia, or psychosis is improved or at baseline  Description: INTERVENTIONS:  1. Assess for possible contributors to thought disturbance, including medications, impaired vision or hearing, underlying metabolic abnormalities, dehydration, psychiatric diagnoses, and notify attending LIP  2. [place] high risk fall precautions, as indicated  3. Provide frequent short contacts to provide reality reorientation, refocusing and direction  4. Decrease environmental stimuli, including noise as appropriate  5. Monitor and intervene to maintain adequate nutrition, hydration, elimination, sleep and activity  6. If unable to ensure safety without constant attention obtain sitter and review sitter guidelines with assigned personnel  7. Initiate Psychosocial CNS and Spiritual Care consult, as indicated  11/4/2024 2116 by Dia Hernandez RN  Outcome: Progressing  11/4/2024 1841 by Chencho Montes De Oca RN  Outcome: Progressing     
gases   Encourage broncho-pulmonary hygiene including cough, deep breathe, incentive spirometry   Assess the need for suctioning and aspirate as needed   Assess and instruct to report shortness of breath or any respiratory difficulty   Respiratory therapy support as indicated     Problem: Pain  Goal: Verbalizes/displays adequate comfort level or baseline comfort level  11/12/2024 1017 by Chadwick Hurley RN  Outcome: Progressing  11/11/2024 2229 by Do Ghotra RN  Outcome: Progressing     Problem: Nutrition Deficit:  Goal: Optimize nutritional status  11/12/2024 1017 by Chadwick Hurley RN  Outcome: Progressing  11/11/2024 2229 by Do Ghotra RN  Outcome: Progressing     Problem: Confusion  Goal: Confusion, delirium, dementia, or psychosis is improved or at baseline  Description: INTERVENTIONS:  1. Assess for possible contributors to thought disturbance, including medications, impaired vision or hearing, underlying metabolic abnormalities, dehydration, psychiatric diagnoses, and notify attending LIP  2. [place] high risk fall precautions, as indicated  3. Provide frequent short contacts to provide reality reorientation, refocusing and direction  4. Decrease environmental stimuli, including noise as appropriate  5. Monitor and intervene to maintain adequate nutrition, hydration, elimination, sleep and activity  6. If unable to ensure safety without constant attention obtain sitter and review sitter guidelines with assigned personnel  7. Initiate Psychosocial CNS and Spiritual Care consult, as indicated  11/12/2024 1017 by Chadwick Hurley RN  Outcome: Progressing  11/11/2024 2229 by Do Ghotra RN  Outcome: Progressing     
improved or at baseline  Description: INTERVENTIONS:  1. Assess for possible contributors to thought disturbance, including medications, impaired vision or hearing, underlying metabolic abnormalities, dehydration, psychiatric diagnoses, and notify attending LIP  2. [place] high risk fall precautions, as indicated  3. Provide frequent short contacts to provide reality reorientation, refocusing and direction  4. Decrease environmental stimuli, including noise as appropriate  5. Monitor and intervene to maintain adequate nutrition, hydration, elimination, sleep and activity  6. If unable to ensure safety without constant attention obtain sitter and review sitter guidelines with assigned personnel  7. Initiate Psychosocial CNS and Spiritual Care consult, as indicated  11/4/2024 0558 by Gladys German, RN  Outcome: Progressing     
psychiatric diagnoses, notify LIP     
refocusing and direction  4. Decrease environmental stimuli, including noise as appropriate  5. Monitor and intervene to maintain adequate nutrition, hydration, elimination, sleep and activity  6. If unable to ensure safety without constant attention obtain sitter and review sitter guidelines with assigned personnel  7. Initiate Psychosocial CNS and Spiritual Care consult, as indicated  11/3/2024 1604 by Kishor Ni, RN  Outcome: Progressing

## 2024-11-13 NOTE — CARE COORDINATION
11/13/2024 1045 received call from Nati from [facility]. She did not receive H&P-faxed  
Met with Aida, the patients daughter. States she would like a SNF in [place] where she lives. Informed she may need to pay out of pocket for transportation there - verbalized understanding. Provided 3 choices: [facility] ([phone]), [facility]([phone]), and [facility] ([phone]). States after the skilled stay, is considering long term placement.    1615 Referrals faxed electronically - face sheet, and manually (face sheet with clinicals information to [facility] and [facility]. Call placed to [facility] admissions department. Requested fax number to submit referral information.  
Met with patient and daughter, Aida, to discuss transitional planning. Aida's goal is for patient to go to ARU if she qualifies. She understands that patient may be too low level. ARU and SNF lists provided in [facility]. Also provided SNF list in [place] as Aida thinks patient may need long term care in [place] which is where Aida lives  
Met with patient's daughter, Aida, to discuss transitional planning. She is also requesting a referral to be sent to [facility] in [place], [place]. Referral sent. Referral to [facility] failed, samient  
Met with pt to complete an SBIRT.  Pt states that she does not drink or use drugs and denies depression.  SBIRT is negative.          Alcohol Screening and Brief Intervention        No results for input(s): \"ALC\" in the last 72 hours.    Alcohol Pre-screening  (MEN ONLY) How many times in the past year have you had 5 or more drinks in a day?: None          Drug Pre-Screening        Drug Screening DAST       Mood Pre-Screening (PHQ-2)  During the past 2 weeks, have you been bothered by, feeling down, depressed or hopeless?  No        I have interviewed Claudia Malone, [id number] regarding  His alcohol consumption/drug use and risk for excessive use. Screenings were negative.  Patient  N/A intervention at this time.   Deferred []    Completed on: 11/5/2024   MARGARITA MENG    
Notified Nati from [facility] [phone] that precert has been approved. They will have a bed available tomorrow. Auth faxed [phone] H&P faxed to Nati at [facility] as requested. Spoke to billing from [facility]. Stretcher transportation will cost around $9,000. Call to [facility] [employer]. Transportation will cost around $5,000. Provided patient's daughter, Elif, with other transportation companies to call for prices. [facility] requesting medical documentation and thinks Medicare will cover transportation. Clinicals, facesheet, medical necessity faxed to [facility]. Spoke to Ann at [facility]. She will arrange a crew for tomorrow and call back with a time    1441 spoke to Ann from [facility]. Patient scheduled for 7:30 tomorrow morning. Notified Nati from [facility]. AVS faxed. Patient and daughter updated    Discharge Report    [facility]  Clinical Case Management Department  Written by: Kenna Kearney RN    Patient Name: Claudia Malone  Attending Provider: Adan Rivera MD  Admit Date: 10/28/2024  5:38 PM  MRN: [id number]  Account: [id number]                     : 1945  Discharge Date: 2024      Disposition: [facility]    Kenna Kearney RN    
Patient lives in [place], [place]. Patient's daughter, caregiver and POA, Elif, wants patient transferred closer to her in [place], [place] so that she can be involved in patient's care. Spoke with Dr Rivera. Patient is unsafe to transport via wheelchair. She will need transported via stretcher with personnel to attend to patient's needs as she is incontinent and has altered mentation. She is also has decreased core strength and unable to sit in a wheelchair  
SW following to complete PHQ-9, pt not oriented at this time.  Will follow and complete once pt is able to participate in assessment.   
Spoke to Nati from [facility]. They are able to accept. Patient and daughter, Aida, updated and agreeable. Requested PT/OT so that precert can be started    1524 precert submitted in [facility] with a start date of tomorrow and approved. Certification #[id number]  
TRANSITIONAL CARE/DISCHARGE PLANNING ONGOING EVALUATION      Reason for Admission: Acute cystitis without hematuria [N30.00]  Hypothermia, initial encounter [T68.XXXA]  Septic shock (HCC) [A41.9, R65.21]  Sepsis, due to unspecified organism, unspecified whether acute organ dysfunction present (HCC) [A41.9]     Hospital day:7    Patient goals/Transitional Plan:    Patient remains INtubated and sedated at this time, Fio2 40%, PEEP 5- off all pressors, plan for possible extubation today, Plan dependent on pt progress SNF VS [facility]        Readmission Risk              Risk of Unplanned Readmission:  26                     
TRANSITIONAL CARE/DISCHARGE PLANNING ONGOING EVALUATION      Reason for Admission: Acute cystitis without hematuria [N30.00]  Hypothermia, initial encounter [T68.XXXA]  Septic shock (HCC) [A41.9, R65.21]  Sepsis, due to unspecified organism, unspecified whether acute organ dysfunction present (HCC) [A41.9]     Hospital day:8    Patient goals/Transitional Plan:    Patient was extubated yesterday, on 2L NC, NG remains in place, PT/OT eval today,   Spoke with patient and daughter about transition and discharge planning, daughter stated she lives in [facility], their goal is to get patient into SNF around her.  Offered to print list from Medicare website, stated she works with SNF's in her area and has a list, discussed levels of care and process of referrals. Stated she will speak with family and inform CM when they make choice.      Daughter request to speak with Help program for possible Medicaid application, RENE left for [facility]oc[facility] CRUZ    Readmission Risk              Risk of Unplanned Readmission:  25                     
Transitional planning. I/S FiO2 40%, PEEP of 5, Levo, Vaso, Livan, Amio gtts, OG for TF, Flexion withdrawals. HD today, H & H Q 6hrs. POC dependent on pt progress  
Transitional planning. [facility] to [facility], they can not accept anyone until [facility] transition is complete. They have no power at night.   Informed pt's daughter, Aida.  I/S  FiO2 30%, PEEP of 5, Livan gtt, no commands. CT head today, Nephrology following for HD needs.. POC dependent on pt progress  
Plan? (P) Yes (POC dependent on pt progress)    JENN MONROY RN  Case Management Department  Ph: [phone] Fax: [phone]

## 2024-11-13 NOTE — DISCHARGE SUMMARY
[facility]     Department of Internal Medicine - Staff Internal Medicine Teaching Service    INPATIENT DISCHARGE SUMMARY      Patient Identification:  Claudia Malone is a 79 y.o. female.  :  1945  MRN: [id number]     Acct: [id number]   PCP: Aime Barrett MD  Admit Date:  10/28/2024  Discharge date and time: No discharge date for patient encounter.   Attending Provider: Adan Rivera MD                                     ACTIVE DISCHARGE DIAGNOSES     Hospital Problem Lists:  Principal Problem:    Sepsis (HCC)  Active Problems:    Acute kidney failure (HCC)    Rhabdomyolysis    Urinary tract infection    Atrial fibrillation (HCC)    Decompensated hepatic cirrhosis (HCC)    E. coli septicemia (HCC)    Pyelonephritis    Acute encephalopathy    History of type 2 diabetes mellitus    VO (nonalcoholic steatohepatitis)    Thrombocytopenia (HCC)    Cerebrovascular accident (CVA) (HCC)    Debility    Gram-neg septicemia (HCC)    Acute metabolic encephalopathy    Generalized muscle weakness    Septic shock (HCC)  Resolved Problems:    Lactic acidosis    Hypothermia    SIRS (systemic inflammatory response syndrome) (HCC)    CRP elevated    Elevated procalcitonin      HOSPITAL STAY     Brief Inpatient course:   Claudia Malone is a 79 y.o. female past medical history significant for hypertension   Type 2 diabetes mellitus   Dyslipidemia   Decompensated chronic liver disease secondary to Vo with varices    who was admitted for the management of Sepsis (HCC), presented to the emergency department with unresponsiveness.   Patient presented to the emergency department was brought by EMS on 10/28/2024.  Patient was found out by her brother at her home lying dying on kitchen, morning and surrounded by emesis and feces.  EMS came and intubated the patient for airway protection, patient was also hypotensive and was resuscitated with epinephrine infusion.    ED course:  Patient was

## 2024-11-27 PROBLEM — N39.0 URINARY TRACT INFECTION: Status: RESOLVED | Noted: 2024-10-28 | Resolved: 2024-11-27
